# Patient Record
Sex: MALE | Race: WHITE | Employment: FULL TIME | URBAN - NONMETROPOLITAN AREA
[De-identification: names, ages, dates, MRNs, and addresses within clinical notes are randomized per-mention and may not be internally consistent; named-entity substitution may affect disease eponyms.]

---

## 2017-11-28 ENCOUNTER — OFFICE VISIT (OUTPATIENT)
Dept: FAMILY MEDICINE CLINIC | Age: 57
End: 2017-11-28
Payer: COMMERCIAL

## 2017-11-28 VITALS
RESPIRATION RATE: 20 BRPM | SYSTOLIC BLOOD PRESSURE: 130 MMHG | HEIGHT: 69 IN | WEIGHT: 218 LBS | DIASTOLIC BLOOD PRESSURE: 82 MMHG | BODY MASS INDEX: 32.29 KG/M2 | HEART RATE: 102 BPM | OXYGEN SATURATION: 97 %

## 2017-11-28 DIAGNOSIS — J20.9 ACUTE BRONCHITIS, UNSPECIFIED ORGANISM: Primary | ICD-10-CM

## 2017-11-28 PROCEDURE — 99213 OFFICE O/P EST LOW 20 MIN: CPT | Performed by: FAMILY MEDICINE

## 2017-11-28 RX ORDER — METHYLPREDNISOLONE 4 MG/1
TABLET ORAL
Qty: 1 KIT | Refills: 0 | Status: SHIPPED | OUTPATIENT
Start: 2017-11-28 | End: 2017-12-04

## 2017-11-28 RX ORDER — AZITHROMYCIN 250 MG/1
TABLET, FILM COATED ORAL
Qty: 6 TABLET | Refills: 0 | Status: SHIPPED | OUTPATIENT
Start: 2017-11-28 | End: 2017-12-08

## 2017-11-28 ASSESSMENT — ENCOUNTER SYMPTOMS
DIARRHEA: 0
SORE THROAT: 1
WHEEZING: 1
NAUSEA: 0
SINUS PRESSURE: 1
RHINORRHEA: 1
SHORTNESS OF BREATH: 1
COUGH: 1
ABDOMINAL PAIN: 0
CHEST TIGHTNESS: 1
CONSTIPATION: 0
EYE DISCHARGE: 0

## 2017-11-28 ASSESSMENT — PATIENT HEALTH QUESTIONNAIRE - PHQ9
1. LITTLE INTEREST OR PLEASURE IN DOING THINGS: 0
2. FEELING DOWN, DEPRESSED OR HOPELESS: 0
SUM OF ALL RESPONSES TO PHQ9 QUESTIONS 1 & 2: 0
SUM OF ALL RESPONSES TO PHQ QUESTIONS 1-9: 0

## 2017-11-28 NOTE — PROGRESS NOTES
[facility]  [address] [place] [address]  Dept: [phone]  Dept Fax: [phone]  Loc: [phone]    Cherylyn Collet is a 61 y.o. male who presents today for his medical conditions/complaints as noted below. Chief Complaint   Patient presents with    Cough     chest congestion chest tightness and SOB with the cough started yesterday sinus pressure congestion            HPI:     Cough   This is a new problem. The current episode started in the past 7 days. The problem has been gradually worsening. The problem occurs every few minutes. The cough is productive of sputum. Associated symptoms include chills, myalgias, nasal congestion, postnasal drip, rhinorrhea, a sore throat, shortness of breath and wheezing. Pertinent negatives include no chest pain, fever or headaches. The symptoms are aggravated by lying down and cold air. He has tried OTC cough suppressant for the symptoms. The treatment provided no relief. There is no history of asthma, bronchiectasis or COPD. Current Outpatient Prescriptions   Medication Sig Dispense Refill    methylPREDNISolone (MEDROL DOSEPACK) 4 MG tablet Take by mouth. 1 kit 0    azithromycin (ZITHROMAX Z-RL) 250 MG tablet As directed 6 tablet 0     No current facility-administered medications for this visit. No Known Allergies    Subjective:      Review of Systems   Constitutional: Positive for activity change, chills and fatigue. Negative for fever. HENT: Positive for congestion, postnasal drip, rhinorrhea, sinus pressure and sore throat. Eyes: Negative for discharge and visual disturbance. Respiratory: Positive for cough, chest tightness, shortness of breath and wheezing. Cardiovascular: Negative for chest pain and palpitations. Gastrointestinal: Negative for abdominal pain, constipation, diarrhea and nausea. Genitourinary: Negative for dysuria and hematuria.    Musculoskeletal: Positive for myalgias. Negative for arthralgias. Neurological: Negative for dizziness and headaches. Psychiatric/Behavioral: Negative for sleep disturbance. The patient is not nervous/anxious. Objective:     /82   Pulse 102   Resp 20   Ht 5' 9\" (1.753 m)   Wt 218 lb (98.9 kg)   SpO2 97%   BMI 32.19 kg/m²     Physical Exam   Constitutional: He is oriented to person, place, and time. He appears well-developed and well-nourished. No distress. HENT:   Head: Normocephalic and atraumatic. Right Ear: Hearing, tympanic membrane, external ear and ear canal normal.   Left Ear: Hearing, tympanic membrane, external ear and ear canal normal.   Nose: Right sinus exhibits no maxillary sinus tenderness and no frontal sinus tenderness. Left sinus exhibits no maxillary sinus tenderness and no frontal sinus tenderness. Mouth/Throat: Oropharynx is clear and moist and mucous membranes are normal. No oropharyngeal exudate, posterior oropharyngeal edema or posterior oropharyngeal erythema. Eyes: Conjunctivae and EOM are normal. Pupils are equal, round, and reactive to light. Right eye exhibits no discharge. Left eye exhibits no discharge. No scleral icterus. Neck: Normal range of motion. Neck supple. Cardiovascular: Normal rate, regular rhythm, normal heart sounds and intact distal pulses. Pulmonary/Chest: Effort normal. No respiratory distress. He has decreased breath sounds. He has wheezes. Abdominal: Soft. Bowel sounds are normal. He exhibits no distension. There is no tenderness. Musculoskeletal: Normal range of motion. He exhibits no edema or tenderness. Lymphadenopathy:     He has no cervical adenopathy. Neurological: He is alert and oriented to person, place, and time. He exhibits normal muscle tone. Coordination normal.   Skin: Skin is warm and dry. No rash noted. Psychiatric: He has a normal mood and affect.  His behavior is normal. Judgment and thought content normal.   Nursing note and vitals reviewed. Assessment:      1. Acute bronchitis, unspecified organism  methylPREDNISolone (MEDROL DOSEPACK) 4 MG tablet    azithromycin (ZITHROMAX Z-RL) 250 MG tablet       Plan:     Medrol dose pack and zpack for bronchitis. Follow up if not improved  Discussed use, benefit, and side effects of prescribed medications. Barriers to medication compliance addressed. All patient questions answered. Pt voiced understanding. No Follow-up on file. No orders of the defined types were placed in this encounter. Orders Placed This Encounter   Medications    methylPREDNISolone (MEDROL DOSEPACK) 4 MG tablet     Sig: Take by mouth.      Dispense:  1 kit     Refill:  0    azithromycin (ZITHROMAX Z-RL) 250 MG tablet     Sig: As directed     Dispense:  6 tablet     Refill:  0           Electronically signed by Murali Nuñez MD on 11/28/2017 at 9:53 AM

## 2017-11-28 NOTE — PATIENT INSTRUCTIONS
Tobacco Cessation Programs     Telephonic behavior modification  Saloni White ([phone])   Counseling service for those who are ready to quit using tobacco.     Available for uninsured [place] residents, [place] recipients, pregnant women, or patients whose health plans or employers are members of the [address] [place] [address] behavior modification   http://[place]. [employer]uitlogix. org   Online support program to help patients through each step of the quitting process. Available 24 hours a day 7 days a week. Provides up to date researched based tool, step-by-step guides, and motivational messages. Online behavior modification   www.lungusa.org/stop-smoking/how-to-quit   HelpLine: [facility] (911-2832)   Email questions to:  [email or web address]. org    Website offers resources to help tobacco users figure out their reasons for quitting and then take the big step of quitting for good. Hypnosis   Location: [address] [place] [address] [facility] [place] [address] [address], [place]   Contact: Napoleon Cabrera, PhD at [phone]   Hypnosis for tobacco cessation   Cost $225 for the initial session and $175 for each session afterwards. Most patients require 6-8 sessions. There is the option to submit through the patients insurance. Hypnosis and behavior modification   Location:  [address] [address], [place]   Contact: Zack Lynne, PhD at [phone]  [facility] Counseling and hypnosis for nicotine addition   Cost: For uninsured patients:  Please call above phone number  Cost for insured patients depends on patients insurance plan. Behavior modification   Location: [facility], [address] [place] [address] [address] [employer] [place] [address] [place] [address], [place]   Contact: Lucretia Mccall include four one-on-one appointments between the patient and a respiratory therapist.  The four appointments span over three weeks. The respiratory therapist schedules one of the appointments to occur 48 hours after the patients quit date.     Cost $100 total for the four sessions.   Tobacco cessation products are not included in the cost and are not provided by [facility].     [facility]

## 2017-12-04 ENCOUNTER — TELEPHONE (OUTPATIENT)
Dept: FAMILY MEDICINE CLINIC | Age: 57
End: 2017-12-04

## 2017-12-04 RX ORDER — METHYLPREDNISOLONE 4 MG/1
TABLET ORAL
Qty: 1 KIT | Refills: 0 | Status: SHIPPED | OUTPATIENT
Start: 2017-12-04 | End: 2017-12-10

## 2017-12-04 RX ORDER — AMOXICILLIN AND CLAVULANATE POTASSIUM 875; 125 MG/1; MG/1
1 TABLET, FILM COATED ORAL 2 TIMES DAILY WITH MEALS
Qty: 20 TABLET | Refills: 0 | Status: SHIPPED | OUTPATIENT
Start: 2017-12-04 | End: 2017-12-11

## 2017-12-04 NOTE — TELEPHONE ENCOUNTER
12/4/17 Pt is asking if JR could call another script in for his sxs. Pt was seen 11/28/17 for bronchitis, (ATB and medrol dose pack) and he is not feeling any better. Pt still has a harsh non productive cough, some difficulty breathing. Pt goes to [facility].  Pls advise thanks/boone

## 2017-12-11 ENCOUNTER — APPOINTMENT (OUTPATIENT)
Dept: GENERAL RADIOLOGY | Age: 57
DRG: 308 | End: 2017-12-11
Payer: COMMERCIAL

## 2017-12-11 ENCOUNTER — APPOINTMENT (OUTPATIENT)
Dept: CT IMAGING | Age: 57
DRG: 308 | End: 2017-12-11
Payer: COMMERCIAL

## 2017-12-11 ENCOUNTER — APPOINTMENT (OUTPATIENT)
Dept: INTERVENTIONAL RADIOLOGY/VASCULAR | Age: 57
DRG: 308 | End: 2017-12-11
Payer: COMMERCIAL

## 2017-12-11 ENCOUNTER — HOSPITAL ENCOUNTER (INPATIENT)
Age: 57
LOS: 4 days | Discharge: HOME | DRG: 308 | End: 2017-12-15
Attending: FAMILY MEDICINE | Admitting: INTERNAL MEDICINE
Payer: COMMERCIAL

## 2017-12-11 ENCOUNTER — OFFICE VISIT (OUTPATIENT)
Dept: FAMILY MEDICINE CLINIC | Age: 57
End: 2017-12-11
Payer: COMMERCIAL

## 2017-12-11 VITALS
SYSTOLIC BLOOD PRESSURE: 132 MMHG | RESPIRATION RATE: 20 BRPM | WEIGHT: 234 LBS | HEART RATE: 80 BPM | BODY MASS INDEX: 34.66 KG/M2 | DIASTOLIC BLOOD PRESSURE: 84 MMHG | OXYGEN SATURATION: 98 % | HEIGHT: 69 IN

## 2017-12-11 DIAGNOSIS — I48.91 ATRIAL FIBRILLATION WITH RVR (HCC): Primary | ICD-10-CM

## 2017-12-11 DIAGNOSIS — R06.02 SHORTNESS OF BREATH: ICD-10-CM

## 2017-12-11 DIAGNOSIS — D72.829 LEUKOCYTOSIS, UNSPECIFIED TYPE: ICD-10-CM

## 2017-12-11 DIAGNOSIS — R00.0 TACHYCARDIA: ICD-10-CM

## 2017-12-11 LAB
ALBUMIN SERPL-MCNC: 4.3 G/DL (ref 3.5–5.1)
ALP BLD-CCNC: 57 U/L (ref 38–126)
ALT SERPL-CCNC: 48 U/L (ref 11–66)
AMPHETAMINE+METHAMPHETAMINE URINE SCREEN: NEGATIVE
ANION GAP SERPL CALCULATED.3IONS-SCNC: 14 MEQ/L (ref 8–16)
ANISOCYTOSIS: ABNORMAL
APTT: 30.3 SECONDS (ref 22–38)
APTT: > 200 SECONDS (ref 22–38)
AST SERPL-CCNC: 29 U/L (ref 5–40)
BARBITURATE QUANTITATIVE URINE: NEGATIVE
BASOPHILS # BLD: 0.3 %
BASOPHILS ABSOLUTE: 0 THOU/MM3 (ref 0–0.1)
BENZODIAZEPINE QUANTITATIVE URINE: NEGATIVE
BILIRUB SERPL-MCNC: 2.4 MG/DL (ref 0.3–1.2)
BILIRUBIN DIRECT: 0.5 MG/DL (ref 0–0.3)
BILIRUBIN URINE: NEGATIVE
BLOOD, URINE: NEGATIVE
BUN BLDV-MCNC: 18 MG/DL (ref 7–22)
CALCIUM SERPL-MCNC: 9 MG/DL (ref 8.5–10.5)
CANNABINOID QUANTITATIVE URINE: NEGATIVE
CHARACTER, URINE: CLEAR
CHLORIDE BLD-SCNC: 100 MEQ/L (ref 98–111)
CO2: 26 MEQ/L (ref 23–33)
COCAINE METABOLITE QUANTITATIVE URINE: NEGATIVE
COLOR: YELLOW
CREAT SERPL-MCNC: 0.9 MG/DL (ref 0.4–1.2)
EKG ATRIAL RATE: 190 BPM
EKG Q-T INTERVAL: 202 MS
EKG QRS DURATION: 70 MS
EKG QTC CALCULATION (BAZETT): 369 MS
EKG R AXIS: 112 DEGREES
EKG T AXIS: 57 DEGREES
EKG VENTRICULAR RATE: 201 BPM
EOSINOPHIL # BLD: 0.7 %
EOSINOPHILS ABSOLUTE: 0.1 THOU/MM3 (ref 0–0.4)
ETHYL ALCOHOL, SERUM: < 0.01 %
FLU A ANTIGEN: NEGATIVE
FLU B ANTIGEN: NEGATIVE
GFR SERPL CREATININE-BSD FRML MDRD: 86 ML/MIN/1.73M2
GLUCOSE BLD-MCNC: 118 MG/DL (ref 70–108)
GLUCOSE URINE: NEGATIVE MG/DL
HCT VFR BLD CALC: 56.6 % (ref 42–52)
HEMOGLOBIN: 18.7 GM/DL (ref 14–18)
INR BLD: 1 (ref 0.85–1.13)
KETONES, URINE: NEGATIVE
LACTIC ACID: 1.4 MMOL/L (ref 0.5–2.2)
LEUKOCYTE ESTERASE, URINE: NEGATIVE
LV EF: 28 %
LVEF MODALITY: NORMAL
LYMPHOCYTES # BLD: 13.7 %
LYMPHOCYTES ABSOLUTE: 2.2 THOU/MM3 (ref 1–4.8)
MAGNESIUM: 2.2 MG/DL (ref 1.6–2.4)
MCH RBC QN AUTO: 32 PG (ref 27–31)
MCHC RBC AUTO-ENTMCNC: 33 GM/DL (ref 33–37)
MCV RBC AUTO: 97 FL (ref 80–94)
MONOCYTES # BLD: 7.8 %
MONOCYTES ABSOLUTE: 1.3 THOU/MM3 (ref 0.4–1.3)
NITRITE, URINE: NEGATIVE
NUCLEATED RED BLOOD CELLS: 0 /100 WBC
OPIATES, URINE: NEGATIVE
OSMOLALITY CALCULATION: 282.4 MOSMOL/KG (ref 275–300)
OXYCODONE: NEGATIVE
PDW BLD-RTO: 14.9 % (ref 11.5–14.5)
PH UA: 6
PHENCYCLIDINE QUANTITATIVE URINE: NEGATIVE
PLATELET # BLD: 141 THOU/MM3 (ref 130–400)
PMV BLD AUTO: 10 MCM (ref 7.4–10.4)
POTASSIUM SERPL-SCNC: 4.2 MEQ/L (ref 3.5–5.2)
PRO-BNP: 2274 PG/ML (ref 0–900)
PROCALCITONIN: 0.18 NG/ML (ref 0.01–0.09)
PROTEIN UA: NEGATIVE
RBC # BLD: 5.83 MILL/MM3 (ref 4.7–6.1)
SEG NEUTROPHILS: 77.5 %
SEGMENTED NEUTROPHILS ABSOLUTE COUNT: 12.7 THOU/MM3 (ref 1.8–7.7)
SODIUM BLD-SCNC: 140 MEQ/L (ref 135–145)
SPECIFIC GRAVITY, URINE: 1.03 (ref 1–1.03)
T4 FREE: 1.26 NG/DL (ref 0.93–1.76)
TOTAL PROTEIN: 6.7 G/DL (ref 6.1–8)
TROPONIN T: < 0.01 NG/ML
TSH SERPL DL<=0.05 MIU/L-ACNC: 4.66 UIU/ML (ref 0.4–4.2)
UROBILINOGEN, URINE: 0.2 EU/DL
WBC # BLD: 16.4 THOU/MM3 (ref 4.8–10.8)

## 2017-12-11 PROCEDURE — 6360000002 HC RX W HCPCS

## 2017-12-11 PROCEDURE — 80307 DRUG TEST PRSMV CHEM ANLYZR: CPT

## 2017-12-11 PROCEDURE — 96365 THER/PROPH/DIAG IV INF INIT: CPT

## 2017-12-11 PROCEDURE — 1200000003 HC TELEMETRY R&B

## 2017-12-11 PROCEDURE — 83880 ASSAY OF NATRIURETIC PEPTIDE: CPT

## 2017-12-11 PROCEDURE — 84443 ASSAY THYROID STIM HORMONE: CPT

## 2017-12-11 PROCEDURE — 93971 EXTREMITY STUDY: CPT

## 2017-12-11 PROCEDURE — G0480 DRUG TEST DEF 1-7 CLASSES: HCPCS

## 2017-12-11 PROCEDURE — 87040 BLOOD CULTURE FOR BACTERIA: CPT

## 2017-12-11 PROCEDURE — 2500000003 HC RX 250 WO HCPCS: Performed by: FAMILY MEDICINE

## 2017-12-11 PROCEDURE — 85025 COMPLETE CBC W/AUTO DIFF WBC: CPT

## 2017-12-11 PROCEDURE — 84439 ASSAY OF FREE THYROXINE: CPT

## 2017-12-11 PROCEDURE — 99285 EMERGENCY DEPT VISIT HI MDM: CPT

## 2017-12-11 PROCEDURE — 2580000003 HC RX 258: Performed by: FAMILY MEDICINE

## 2017-12-11 PROCEDURE — 93000 ELECTROCARDIOGRAM COMPLETE: CPT | Performed by: NURSE PRACTITIONER

## 2017-12-11 PROCEDURE — 6360000002 HC RX W HCPCS: Performed by: INTERNAL MEDICINE

## 2017-12-11 PROCEDURE — 83735 ASSAY OF MAGNESIUM: CPT

## 2017-12-11 PROCEDURE — 71010 XR CHEST PORTABLE: CPT

## 2017-12-11 PROCEDURE — 84484 ASSAY OF TROPONIN QUANT: CPT

## 2017-12-11 PROCEDURE — 93306 TTE W/DOPPLER COMPLETE: CPT

## 2017-12-11 PROCEDURE — 85610 PROTHROMBIN TIME: CPT

## 2017-12-11 PROCEDURE — 99214 OFFICE O/P EST MOD 30 MIN: CPT | Performed by: NURSE PRACTITIONER

## 2017-12-11 PROCEDURE — 99223 1ST HOSP IP/OBS HIGH 75: CPT | Performed by: INTERNAL MEDICINE

## 2017-12-11 PROCEDURE — 87804 INFLUENZA ASSAY W/OPTIC: CPT

## 2017-12-11 PROCEDURE — 83605 ASSAY OF LACTIC ACID: CPT

## 2017-12-11 PROCEDURE — 2580000003 HC RX 258: Performed by: INTERNAL MEDICINE

## 2017-12-11 PROCEDURE — 85730 THROMBOPLASTIN TIME PARTIAL: CPT

## 2017-12-11 PROCEDURE — 36415 COLL VENOUS BLD VENIPUNCTURE: CPT

## 2017-12-11 PROCEDURE — 96375 TX/PRO/DX INJ NEW DRUG ADDON: CPT

## 2017-12-11 PROCEDURE — 82248 BILIRUBIN DIRECT: CPT

## 2017-12-11 PROCEDURE — 96376 TX/PRO/DX INJ SAME DRUG ADON: CPT

## 2017-12-11 PROCEDURE — 96361 HYDRATE IV INFUSION ADD-ON: CPT

## 2017-12-11 PROCEDURE — 81003 URINALYSIS AUTO W/O SCOPE: CPT

## 2017-12-11 PROCEDURE — 80053 COMPREHEN METABOLIC PANEL: CPT

## 2017-12-11 PROCEDURE — 6360000004 HC RX CONTRAST MEDICATION: Performed by: FAMILY MEDICINE

## 2017-12-11 PROCEDURE — 84145 PROCALCITONIN (PCT): CPT

## 2017-12-11 PROCEDURE — 71275 CT ANGIOGRAPHY CHEST: CPT

## 2017-12-11 PROCEDURE — 93005 ELECTROCARDIOGRAM TRACING: CPT

## 2017-12-11 RX ORDER — HEPARIN SODIUM 1000 [USP'U]/ML
INJECTION, SOLUTION INTRAVENOUS; SUBCUTANEOUS
Status: COMPLETED
Start: 2017-12-11 | End: 2017-12-11

## 2017-12-11 RX ORDER — AMOXICILLIN AND CLAVULANATE POTASSIUM 875; 125 MG/1; MG/1
1 TABLET, FILM COATED ORAL 2 TIMES DAILY
Status: ON HOLD | COMMUNITY
End: 2017-12-15 | Stop reason: HOSPADM

## 2017-12-11 RX ORDER — SODIUM CHLORIDE 0.9 % (FLUSH) 0.9 %
10 SYRINGE (ML) INJECTION EVERY 12 HOURS SCHEDULED
Status: DISCONTINUED | OUTPATIENT
Start: 2017-12-11 | End: 2017-12-15 | Stop reason: HOSPADM

## 2017-12-11 RX ORDER — DILTIAZEM HYDROCHLORIDE 5 MG/ML
20 INJECTION INTRAVENOUS ONCE
Status: COMPLETED | OUTPATIENT
Start: 2017-12-11 | End: 2017-12-11

## 2017-12-11 RX ORDER — HEPARIN SODIUM 1000 [USP'U]/ML
4000 INJECTION, SOLUTION INTRAVENOUS; SUBCUTANEOUS PRN
Status: DISCONTINUED | OUTPATIENT
Start: 2017-12-11 | End: 2017-12-13

## 2017-12-11 RX ORDER — HEPARIN SODIUM 10000 [USP'U]/100ML
11 INJECTION, SOLUTION INTRAVENOUS CONTINUOUS
Status: DISCONTINUED | OUTPATIENT
Start: 2017-12-11 | End: 2017-12-13

## 2017-12-11 RX ORDER — DILTIAZEM HYDROCHLORIDE 5 MG/ML
10 INJECTION INTRAVENOUS ONCE
Status: DISCONTINUED | OUTPATIENT
Start: 2017-12-11 | End: 2017-12-11

## 2017-12-11 RX ORDER — METHYLPREDNISOLONE 4 MG/1
4 TABLET ORAL SEE ADMIN INSTRUCTIONS
Status: ON HOLD | COMMUNITY
End: 2017-12-15 | Stop reason: HOSPADM

## 2017-12-11 RX ORDER — AMOXICILLIN AND CLAVULANATE POTASSIUM 875; 125 MG/1; MG/1
1 TABLET, FILM COATED ORAL 2 TIMES DAILY
Status: DISCONTINUED | OUTPATIENT
Start: 2017-12-11 | End: 2017-12-11 | Stop reason: ALTCHOICE

## 2017-12-11 RX ORDER — SODIUM CHLORIDE 0.9 % (FLUSH) 0.9 %
10 SYRINGE (ML) INJECTION PRN
Status: DISCONTINUED | OUTPATIENT
Start: 2017-12-11 | End: 2017-12-15 | Stop reason: HOSPADM

## 2017-12-11 RX ORDER — HEPARIN SODIUM 1000 [USP'U]/ML
2000 INJECTION, SOLUTION INTRAVENOUS; SUBCUTANEOUS PRN
Status: DISCONTINUED | OUTPATIENT
Start: 2017-12-11 | End: 2017-12-13

## 2017-12-11 RX ORDER — METHYLPREDNISOLONE 4 MG/1
4 TABLET ORAL SEE ADMIN INSTRUCTIONS
Status: DISCONTINUED | OUTPATIENT
Start: 2017-12-11 | End: 2017-12-11 | Stop reason: ALTCHOICE

## 2017-12-11 RX ORDER — ONDANSETRON 2 MG/ML
4 INJECTION INTRAMUSCULAR; INTRAVENOUS EVERY 6 HOURS PRN
Status: DISCONTINUED | OUTPATIENT
Start: 2017-12-11 | End: 2017-12-12

## 2017-12-11 RX ORDER — HEPARIN SODIUM 1000 [USP'U]/ML
80 INJECTION, SOLUTION INTRAVENOUS; SUBCUTANEOUS ONCE
Status: COMPLETED | OUTPATIENT
Start: 2017-12-11 | End: 2017-12-11

## 2017-12-11 RX ORDER — ACETAMINOPHEN 325 MG/1
650 TABLET ORAL EVERY 4 HOURS PRN
Status: DISCONTINUED | OUTPATIENT
Start: 2017-12-11 | End: 2017-12-15 | Stop reason: HOSPADM

## 2017-12-11 RX ORDER — HEPARIN SODIUM 1000 [USP'U]/ML
40 INJECTION, SOLUTION INTRAVENOUS; SUBCUTANEOUS PRN
Status: DISCONTINUED | OUTPATIENT
Start: 2017-12-11 | End: 2017-12-12 | Stop reason: SDUPTHER

## 2017-12-11 RX ORDER — SODIUM CHLORIDE 9 MG/ML
INJECTION, SOLUTION INTRAVENOUS CONTINUOUS
Status: DISCONTINUED | OUTPATIENT
Start: 2017-12-11 | End: 2017-12-12

## 2017-12-11 RX ORDER — 0.9 % SODIUM CHLORIDE 0.9 %
1000 INTRAVENOUS SOLUTION INTRAVENOUS ONCE
Status: COMPLETED | OUTPATIENT
Start: 2017-12-11 | End: 2017-12-11

## 2017-12-11 RX ORDER — HEPARIN SODIUM 10000 [USP'U]/100ML
18 INJECTION, SOLUTION INTRAVENOUS CONTINUOUS
Status: DISCONTINUED | OUTPATIENT
Start: 2017-12-11 | End: 2017-12-12 | Stop reason: SDUPTHER

## 2017-12-11 RX ORDER — HEPARIN SODIUM 1000 [USP'U]/ML
80 INJECTION, SOLUTION INTRAVENOUS; SUBCUTANEOUS PRN
Status: DISCONTINUED | OUTPATIENT
Start: 2017-12-11 | End: 2017-12-12 | Stop reason: SDUPTHER

## 2017-12-11 RX ORDER — HEPARIN SODIUM 10000 [USP'U]/100ML
INJECTION, SOLUTION INTRAVENOUS
Status: COMPLETED
Start: 2017-12-11 | End: 2017-12-11

## 2017-12-11 RX ADMIN — DILTIAZEM HYDROCHLORIDE 5 MG/HR: 5 INJECTION INTRAVENOUS at 12:21

## 2017-12-11 RX ADMIN — HEPARIN SODIUM 7800 UNITS: 1000 INJECTION, SOLUTION INTRAVENOUS; SUBCUTANEOUS at 11:22

## 2017-12-11 RX ADMIN — HEPARIN SODIUM AND DEXTROSE 11 UNITS/KG/HR: 10000; 5 INJECTION INTRAVENOUS at 14:02

## 2017-12-11 RX ADMIN — HEPARIN SODIUM 18 UNITS/KG/HR: 10000 INJECTION, SOLUTION INTRAVENOUS at 11:22

## 2017-12-11 RX ADMIN — SODIUM CHLORIDE 1000 ML: 9 INJECTION, SOLUTION INTRAVENOUS at 10:31

## 2017-12-11 RX ADMIN — DILTIAZEM HYDROCHLORIDE 20 MG: 5 INJECTION INTRAVENOUS at 10:31

## 2017-12-11 RX ADMIN — HEPARIN SODIUM AND DEXTROSE 18 UNITS/KG/HR: 10000; 5 INJECTION INTRAVENOUS at 11:22

## 2017-12-11 RX ADMIN — DILTIAZEM HYDROCHLORIDE 15 MG/HR: 5 INJECTION INTRAVENOUS at 22:08

## 2017-12-11 RX ADMIN — IOPAMIDOL 80 ML: 755 INJECTION, SOLUTION INTRAVENOUS at 12:10

## 2017-12-11 RX ADMIN — SODIUM CHLORIDE: 9 INJECTION, SOLUTION INTRAVENOUS at 14:01

## 2017-12-11 ASSESSMENT — ENCOUNTER SYMPTOMS
CHEST TIGHTNESS: 0
WHEEZING: 0
NAUSEA: 0
SHORTNESS OF BREATH: 1
EYE REDNESS: 0
STRIDOR: 0
SHORTNESS OF BREATH: 1
COLOR CHANGE: 0
COUGH: 1
EYE DISCHARGE: 0
SINUS PRESSURE: 0
COUGH: 1
WHEEZING: 0
SORE THROAT: 0
NAUSEA: 0
VOMITING: 0
DIARRHEA: 0
ABDOMINAL DISTENTION: 0
RHINORRHEA: 0
ABDOMINAL PAIN: 0
BACK PAIN: 0
VOMITING: 0
SORE THROAT: 0
DIARRHEA: 0
CONSTIPATION: 0
ABDOMINAL PAIN: 0
BACK PAIN: 0

## 2017-12-11 NOTE — PROGRESS NOTES
Pt admitted to  6K21 from ED. Complaints: A. Fib RVR. IV normal saline infusing into the antecubital left, condition patent and no redness at a rate of 100 mls/ hour with about 900 mls in the bag still. IV site free of s/s of infection or infiltration. Vital signs obtained. Assessment and data collection initiated. Oriented to room. Policies and procedures for 6K explained All questions answered with no further questions at this time. Fall prevention and safety brochure discussed with patient.

## 2017-12-11 NOTE — ED PROVIDER NOTES
[facility]  eMERGENCY dEPARTMENT eNCOUnter          CHIEF COMPLAINT       Chief Complaint   Patient presents with    Atrial Fibrillation     RVR       Nurses Notes reviewed and I agree except as noted in the HPI. HISTORY OF PRESENT ILLNESS    Alicia Bullard is a 61 y.o. male who presents to the Emergency Department for the evaluation of tachycardia. The patient states that he has no history of an irregular heart beat. The patient reports that he was evaluated 3 weeks ago for a \"chest cold\" and shortness of breath and was given antibiotics and a prednisone dose pack. He was evaluated again in the middle of last week for the same symptoms and was given Amoxicillin and another regimen of steroids. He admits a feeling of water in his ear without any recent swimming, intermittent shortness of breath and a cough. He also reports that his right leg began swelling last night and has remained swollen. He denies any feeling of his heart racing, or any other signs or symptoms at this time. The patient denies any history of any irregular heart beat,  WPW, atrial fibrillation, myocardial infarction, or any other ongoing medical problems. He denies taking any daily medications. The patient returned from a trip to [facility] approximately 6 months ago. His father has a history of atrial fibrillation. He denies any smoking of cigarettes or any alcohol consumption. The HPI was provided by the patient. REVIEW OF SYSTEMS     Review of Systems   Constitutional: Negative for appetite change, chills, fatigue and fever. HENT: Positive for ear discharge (reported a feeling of water in his ear with no recent swimming). Negative for congestion, ear pain, rhinorrhea and sore throat. Eyes: Negative for discharge, redness and visual disturbance. Respiratory: Positive for cough and shortness of breath (intermittent shortness of breath). Negative for wheezing.     Cardiovascular: Negative for chest pain, palpitations and leg swelling. Gastrointestinal: Negative for abdominal pain, diarrhea, nausea and vomiting. Genitourinary: Negative for decreased urine volume, difficulty urinating and dysuria. Musculoskeletal: Negative for arthralgias, back pain, joint swelling and neck pain. Skin: Negative for pallor and rash. Allergic/Immunologic: Negative for environmental allergies. Neurological: Negative for dizziness, syncope, weakness, light-headedness and headaches. Hematological: Negative for adenopathy. Psychiatric/Behavioral: Negative for agitation, confusion, dysphoric mood and suicidal ideas. The patient is not nervous/anxious. PAST MEDICAL HISTORY    has a past medical history of Atrial fibrillation ([facility] [address] SURGICAL HISTORY      has a past surgical history that includes Cholecystectomy. CURRENT MEDICATIONS       Current Discharge Medication List      CONTINUE these medications which have NOT CHANGED    Details   amoxicillin-clavulanate (AUGMENTIN) 875-125 MG per tablet Take 1 tablet by mouth 2 times daily      methylPREDNISolone (MEDROL, RL,) 4 MG tablet Take 4 mg by mouth See Admin Instructions Take by mouth. ALLERGIES     is allergic to augmentin [amoxicillin-pot clavulanate]. FAMILY HISTORY     indicated that his mother is alive. He indicated that his father is alive. family history includes Cancer in his father. SOCIAL HISTORY      reports that he has never smoked. His smokeless tobacco use includes Snuff. He reports that he drinks alcohol. He reports that he does not use drugs. PHYSICAL EXAM     INITIAL VITALS:  height is 5' 9\" (1.753 m) and weight is 215 lb (97.5 kg). His oral temperature is 97.8 °F (36.6 °C). His blood pressure is 104/88 and his pulse is 127. His respiration is 18 and oxygen saturation is 95%. Physical Exam   Constitutional: He is oriented to person, place, and time. He appears well-developed and well-nourished.    HENT:   Head: Normocephalic and atraumatic. Right Ear: External ear normal.   Left Ear: External ear normal.   Eyes: Conjunctivae are normal. Right eye exhibits no discharge. Left eye exhibits no discharge. No scleral icterus. Neck: Normal range of motion. Neck supple. No JVD present. Cardiovascular: Normal heart sounds and intact distal pulses. An irregularly irregular rhythm present. Tachycardia present. Exam reveals no gallop and no friction rub. No murmur heard. Pulmonary/Chest: Effort normal. No stridor. No respiratory distress. He has no wheezes. He has no rales. Abdominal: Soft. He exhibits no distension. There is no tenderness. There is no rebound and no guarding. Musculoskeletal: Normal range of motion. He exhibits no edema. Neurological: He is alert and oriented to person, place, and time. He exhibits normal muscle tone. GCS eye subscore is 4. GCS verbal subscore is 5. GCS motor subscore is 6. Skin: Skin is warm and dry. He is not diaphoretic. No erythema. Psychiatric: He has a normal mood and affect. His behavior is normal.   Nursing note and vitals reviewed. DIFFERENTIAL DIAGNOSIS not limited to:   Pulmonary embolism, Afib with RVR, ACDS, pneumonia, bronchitis,     DIAGNOSTIC RESULTS     EKG: All EKG's are interpreted by the Emergency Department Physician who either signs or Co-signs this chart in the absence of a cardiologist.  EKG interpreted by Amee Batista MD:    Vent. Rate: 201 bpm  OK interval: * ms  QRS duration: 70 ms  QTc: 369 ms  P-R-T axes: *, 112, 57  Atrial fibrillation with rapid ventricular response, No STEMI      RADIOLOGY: non-plain film images(s) such as CT, Ultrasound and MRI are read by the radiologist.    CTA CHEST W WO CONTRAST   Final Result       1. No pulmonary emboli or pulmonary infiltrates. 2. There is a small pleural effusion on the right with a trace pleural effusion on the left.       3. A trace amount of ascites is noted along the lateral aspect of the liver. **This report has been created using voice recognition software. It may contain minor errors which are inherent in voice recognition technology. **      Final report electronically signed by Dr. Salvador Sorenson on 12/11/2017 12:29 PM      XR Chest Portable   Final Result   NO EVIDENCE OF ACUTE CARDIOPULMONARY PROCESS. **This report has been created using voice recognition software. It may contain minor errors which are inherent in voice recognition technology. **            Final report electronically signed by Dr. Billie Welch on 12/11/2017 11:33 AM      VL DUP LOWER EXTREMITY VENOUS RIGHT   Final Result   No evidence of deep venous thrombosis in the right lower extremity. Final report electronically signed by Dr. Jerad Cortez on 12/11/2017 11:03 AM          LABS:   Labs Reviewed   CBC WITH AUTO DIFFERENTIAL - Abnormal; Notable for the following:        Result Value    WBC 16.4 (*)     Hemoglobin 18.7 (*)     Hematocrit 56.6 (*)     MCV 97.0 (*)     MCH 32.0 (*)     RDW 14.9 (*)     Segs Absolute 12.7 (*)     All other components within normal limits   BASIC METABOLIC PANEL - Abnormal; Notable for the following:     Glucose 118 (*)     All other components within normal limits   HEPATIC FUNCTION PANEL - Abnormal; Notable for the following:      Total Bilirubin 2.4 (*)     Bilirubin, Direct 0.5 (*)     All other components within normal limits   TSH WITH REFLEX - Abnormal; Notable for the following:     TSH 4.660 (*)     All other components within normal limits   BRAIN NATRIURETIC PEPTIDE - Abnormal; Notable for the following:     Pro-BNP 2274.0 (*)     All other components within normal limits   APTT - Abnormal; Notable for the following:     aPTT > 200.0 (*)     All other components within normal limits   GLOMERULAR FILTRATION RATE, ESTIMATED - Abnormal; Notable for the following:     Est, Glom Filt Rate 86 (*)     All other components within normal limits

## 2017-12-11 NOTE — H&P
Family History:          Problem Relation Age of Onset    Cancer Father        Diet:  DIET CARDIAC;    REVIEW OF SYSTEMS:   Pertinent positives as noted in the HPI. All other systems reviewed and negative. PHYSICAL EXAM:    BP (!) 125/111   Pulse 140   Temp 97.8 °F (36.6 °C) (Oral)   Resp 18   Ht 5' 9\" (1.753 m)   Wt 215 lb (97.5 kg)   SpO2 94%   BMI 31.75 kg/m²     Gen/overall appearance: Not in acute distress. Alert. Head: Normocephalic, atraumatic  Eyes: EOMI, no scleral icterus  CVS: irreg irreg, tachy, Normal S1S2  Pulm: Clear to auscultation bilaterally. No crackles/wheezes  Gastrointestinal: Soft, nontender, nondistended, no guarding or rebound  Extremities: 1+ bilat pitting edema. No erythema or warmth  Neuro: No gross focal deficits noted  Skin: Warm, dry    Labs:     Recent Labs      12/11/17   1015   WBC  16.4*   HGB  18.7*   HCT  56.6*   PLT  141     Recent Labs      12/11/17   1015   NA  140   K  4.2   CL  100   CO2  26   BUN  18   CREATININE  0.9   CALCIUM  9.0     Recent Labs      12/11/17   1015   AST  29   ALT  48   BILIDIR  0.5*   BILITOT  2.4*   ALKPHOS  57     Recent Labs      12/11/17   1015   INR  1.00     No results for input(s): Sukumar Johnson in the last 72 hours. Urinalysis:      Lab Results   Component Value Date    NITRU NEGATIVE 12/11/2017    BLOODU NEGATIVE 12/11/2017    GLUCOSEU NEGATIVE 12/11/2017       Radiology:     Cta Chest W Wo Contrast    Result Date: 12/11/2017  PROCEDURE: CTA CHEST W WO CONTRAST CLINICAL INFORMATION: patient with sob tachycardia cough attention PE. COMPARISON: None available. TECHNIQUE: 1.5 mm axial images were obtained through the chest after the administration of 80 mL Isovue 370 IV contrast.  A non-contrast localizer was obtained. 3D reconstructions were performed on the scanner to include oblique coronal MIP images through both the right and left pulmonary arteries.  All CT scans at this facility use dose modulation, iterative CARDIOPULMONARY PROCESS. **This report has been created using voice recognition software. It may contain minor errors which are inherent in voice recognition technology. ** Final report electronically signed by Dr. Kae Paul on 12/11/2017 11:33 AM    Vl Dup Lower Extremity Venous Right    Result Date: 12/11/2017  PROCEDURE: VL LOWER EXTREMITY VENOUS RIGHT CLINICAL INFORMATION: Right lower extremity swelling TECHNIQUE: High-resolution duplex ultrasound of the right lower extremity was performed. The common femoral, superficial femoral, popliteal and calf vein segments were studied to the ankle. COMPARISON: None FINDINGS: There is normal compressibility with no evidence of thrombus. Flow study shows normal color flow, augmentation and phasic response. No evidence of deep venous thrombosis in the right lower extremity. Final report electronically signed by Dr. Cecile Vargas on 12/11/2017 11:03 AM           DVT prophylaxis: [] Lovenox                                 [] SCDs                                 [x] SQ Heparin - GTT                                 [] Encourage ambulation           [] Already on Anticoagulation    Code Status: Full Code    Disposition:    [x] Home       [] TCU       [] Rehab       [] Psych       [] SNF       [] [place]       [] Other-    ASSESSMENT:    C/Erin Carvajal [address] Problems    Diagnosis Date Noted    Atrial fibrillation with RVR ([address] [I48.91] 12/11/2017     Atrial fib with RVR. CHADS score of 0. Symptomatic with HR >200 prior to arrival.  Possibly exacerbated per URI symptoms. HR trending down on cardizem GTT. Bilaterally LE edema.   Likely CHF 2/2 above  No significant PMH    PLAN:  Wean cardizem GTT as tolerated  On heparin gtt for now for possible cardioversion if he does not convert  Likely will not need AC upon d/c as CHADS score of 0  ECHO  Monitor and replace raza    Thank you Kanu Holland CNP for the opportunity to be involved in this patient's

## 2017-12-11 NOTE — ED NOTES
Pt states that feels pretty good. Breathing better since resting in bed. No other c/o or needs. Will continue to monitor.      Marnie Shankar RN  12/11/17 3188

## 2017-12-12 LAB
ANION GAP SERPL CALCULATED.3IONS-SCNC: 13 MEQ/L (ref 8–16)
ANISOCYTOSIS: ABNORMAL
APTT: 34.5 SECONDS (ref 22–38)
APTT: 56.4 SECONDS (ref 22–38)
APTT: 64.2 SECONDS (ref 22–38)
APTT: 66.4 SECONDS (ref 22–38)
BASOPHILS # BLD: 0.4 %
BASOPHILS ABSOLUTE: 0 THOU/MM3 (ref 0–0.1)
BUN BLDV-MCNC: 13 MG/DL (ref 7–22)
CALCIUM SERPL-MCNC: 8.5 MG/DL (ref 8.5–10.5)
CHLORIDE BLD-SCNC: 101 MEQ/L (ref 98–111)
CO2: 25 MEQ/L (ref 23–33)
CREAT SERPL-MCNC: 0.8 MG/DL (ref 0.4–1.2)
EKG ATRIAL RATE: 150 BPM
EKG Q-T INTERVAL: 280 MS
EKG QRS DURATION: 82 MS
EKG QTC CALCULATION (BAZETT): 468 MS
EKG R AXIS: 105 DEGREES
EKG T AXIS: 39 DEGREES
EKG VENTRICULAR RATE: 168 BPM
EOSINOPHIL # BLD: 1 %
EOSINOPHILS ABSOLUTE: 0.1 THOU/MM3 (ref 0–0.4)
GFR SERPL CREATININE-BSD FRML MDRD: > 90 ML/MIN/1.73M2
GLUCOSE BLD-MCNC: 111 MG/DL (ref 70–108)
HCT VFR BLD CALC: 50.8 % (ref 42–52)
HEMOGLOBIN: 17.3 GM/DL (ref 14–18)
LYMPHOCYTES # BLD: 18.2 %
LYMPHOCYTES ABSOLUTE: 2 THOU/MM3 (ref 1–4.8)
MCH RBC QN AUTO: 33.5 PG (ref 27–31)
MCHC RBC AUTO-ENTMCNC: 34 GM/DL (ref 33–37)
MCV RBC AUTO: 98.6 FL (ref 80–94)
MONOCYTES # BLD: 8.3 %
MONOCYTES ABSOLUTE: 0.9 THOU/MM3 (ref 0.4–1.3)
NUCLEATED RED BLOOD CELLS: 0 /100 WBC
PDW BLD-RTO: 14.9 % (ref 11.5–14.5)
PLATELET # BLD: 116 THOU/MM3 (ref 130–400)
PMV BLD AUTO: 9.9 MCM (ref 7.4–10.4)
POTASSIUM SERPL-SCNC: 4.1 MEQ/L (ref 3.5–5.2)
RBC # BLD: 5.15 MILL/MM3 (ref 4.7–6.1)
SEG NEUTROPHILS: 72.1 %
SEGMENTED NEUTROPHILS ABSOLUTE COUNT: 7.8 THOU/MM3 (ref 1.8–7.7)
SODIUM BLD-SCNC: 139 MEQ/L (ref 135–145)
WBC # BLD: 10.8 THOU/MM3 (ref 4.8–10.8)

## 2017-12-12 PROCEDURE — 85730 THROMBOPLASTIN TIME PARTIAL: CPT

## 2017-12-12 PROCEDURE — 80048 BASIC METABOLIC PNL TOTAL CA: CPT

## 2017-12-12 PROCEDURE — 6360000002 HC RX W HCPCS: Performed by: INTERNAL MEDICINE

## 2017-12-12 PROCEDURE — 1200000003 HC TELEMETRY R&B

## 2017-12-12 PROCEDURE — 99223 1ST HOSP IP/OBS HIGH 75: CPT | Performed by: INTERNAL MEDICINE

## 2017-12-12 PROCEDURE — 2500000003 HC RX 250 WO HCPCS: Performed by: FAMILY MEDICINE

## 2017-12-12 PROCEDURE — 6370000000 HC RX 637 (ALT 250 FOR IP): Performed by: INTERNAL MEDICINE

## 2017-12-12 PROCEDURE — 2580000003 HC RX 258: Performed by: FAMILY MEDICINE

## 2017-12-12 PROCEDURE — 99233 SBSQ HOSP IP/OBS HIGH 50: CPT | Performed by: FAMILY MEDICINE

## 2017-12-12 PROCEDURE — 85025 COMPLETE CBC W/AUTO DIFF WBC: CPT

## 2017-12-12 PROCEDURE — 2580000003 HC RX 258: Performed by: INTERNAL MEDICINE

## 2017-12-12 PROCEDURE — 36415 COLL VENOUS BLD VENIPUNCTURE: CPT

## 2017-12-12 PROCEDURE — 2500000003 HC RX 250 WO HCPCS: Performed by: INTERNAL MEDICINE

## 2017-12-12 RX ORDER — METOPROLOL SUCCINATE 50 MG/1
50 TABLET, EXTENDED RELEASE ORAL 2 TIMES DAILY
Status: DISCONTINUED | OUTPATIENT
Start: 2017-12-12 | End: 2017-12-13

## 2017-12-12 RX ORDER — AMIODARONE HYDROCHLORIDE 200 MG/1
400 TABLET ORAL 2 TIMES DAILY
Status: DISCONTINUED | OUTPATIENT
Start: 2017-12-12 | End: 2017-12-15 | Stop reason: HOSPADM

## 2017-12-12 RX ORDER — BUMETANIDE 0.25 MG/ML
0.5 INJECTION, SOLUTION INTRAMUSCULAR; INTRAVENOUS ONCE
Status: COMPLETED | OUTPATIENT
Start: 2017-12-12 | End: 2017-12-12

## 2017-12-12 RX ORDER — METOPROLOL SUCCINATE 50 MG/1
50 TABLET, EXTENDED RELEASE ORAL ONCE
Status: COMPLETED | OUTPATIENT
Start: 2017-12-12 | End: 2017-12-12

## 2017-12-12 RX ORDER — LISINOPRIL 2.5 MG/1
2.5 TABLET ORAL DAILY
Status: DISCONTINUED | OUTPATIENT
Start: 2017-12-12 | End: 2017-12-15 | Stop reason: HOSPADM

## 2017-12-12 RX ORDER — SPIRONOLACTONE 25 MG/1
25 TABLET ORAL DAILY
Status: COMPLETED | OUTPATIENT
Start: 2017-12-12 | End: 2017-12-13

## 2017-12-12 RX ORDER — BUMETANIDE 0.25 MG/ML
0.5 INJECTION, SOLUTION INTRAMUSCULAR; INTRAVENOUS 2 TIMES DAILY
Status: COMPLETED | OUTPATIENT
Start: 2017-12-12 | End: 2017-12-13

## 2017-12-12 RX ORDER — SPIRONOLACTONE 25 MG/1
25 TABLET ORAL DAILY
Status: DISCONTINUED | OUTPATIENT
Start: 2017-12-12 | End: 2017-12-12

## 2017-12-12 RX ADMIN — SPIRONOLACTONE 25 MG: 25 TABLET ORAL at 14:49

## 2017-12-12 RX ADMIN — DILTIAZEM HYDROCHLORIDE 5 MG/HR: 5 INJECTION INTRAVENOUS at 09:58

## 2017-12-12 RX ADMIN — HEPARIN SODIUM AND DEXTROSE 11 UNITS/KG/HR: 10000; 5 INJECTION INTRAVENOUS at 12:34

## 2017-12-12 RX ADMIN — SODIUM CHLORIDE: 9 INJECTION, SOLUTION INTRAVENOUS at 01:00

## 2017-12-12 RX ADMIN — METOPROLOL SUCCINATE 50 MG: 50 TABLET, FILM COATED, EXTENDED RELEASE ORAL at 14:49

## 2017-12-12 RX ADMIN — METOPROLOL SUCCINATE 50 MG: 50 TABLET, FILM COATED, EXTENDED RELEASE ORAL at 20:26

## 2017-12-12 RX ADMIN — HEPARIN SODIUM 4000 UNITS: 1000 INJECTION, SOLUTION INTRAVENOUS; SUBCUTANEOUS at 01:00

## 2017-12-12 RX ADMIN — Medication 10 ML: at 20:26

## 2017-12-12 RX ADMIN — LISINOPRIL 2.5 MG: 2.5 TABLET ORAL at 14:49

## 2017-12-12 RX ADMIN — DILTIAZEM HYDROCHLORIDE 5 MG/HR: 5 INJECTION INTRAVENOUS at 23:54

## 2017-12-12 RX ADMIN — AMIODARONE HYDROCHLORIDE 400 MG: 200 TABLET ORAL at 14:49

## 2017-12-12 RX ADMIN — BUMETANIDE 0.5 MG: 0.25 INJECTION INTRAMUSCULAR; INTRAVENOUS at 16:21

## 2017-12-12 RX ADMIN — AMIODARONE HYDROCHLORIDE 400 MG: 200 TABLET ORAL at 20:25

## 2017-12-12 RX ADMIN — BUMETANIDE 0.5 MG: 0.25 INJECTION INTRAMUSCULAR; INTRAVENOUS at 20:26

## 2017-12-12 NOTE — PROGRESS NOTES
Hospitalist Progress Note      Patient:  Erasto Mckee      Unit/Bed:6K-21/021-A    YOB: 1960    MRN: [id number]       Acct: [de-identified]     PCP: Jace Menjivar CNP    Date of Admission: 12/11/2017    Assessment/Plan:    1. afib with RVR -- apprec cardio assist -- rate controlled with cardizem gtt -- also on heparin gtt and plan per cardio for HAVEN/CV 12/13 -- amiodarone po started 12/12, toprol 50 mg bid started 12/12 per cardio -- cont monitor rate -- TFT 12/11 WNL -- ischemic w/u as outpt per cardio  -- CTA chest 12/11/17 (-) PE  2. Acute systolic CHF -- new - echo 12/11/17 with EF 25-30% with severe global hypokinesis  -- IV bumex 12/12, aldactone started 12/12 per cardio -- on cardizem with #1 - started on toprol 12/12, lisinopril added 12/12 per cardio  3. Cardiomyopathy -- Echo 12/11/17 EF 25-30%, severe global hypokinesis, mild/mod MR -- ?due to #1 and needs ischemic eval - per cardio plans as outpt with cath -- ?viral with recent URI;  UDS and etoh levels (-) on admission 12/11  4. Dyspnea -- due to #1, 2 -- trops (-) - ischemic w/u as outpt per cardio -- CTA chest 12/11/17 (-) PE or infiltrates, +small pleural effusions  5. Leukocytosis -- afeb - blood cx (-), influenza (-), u/a (-)  - ?due to #1, 6 -- improving WBC 12/12  6. Polycythemia -- improving with diuresis   7. Recent URI -- CTA 12/11 (-) infiltrates  8. Mild/mod MR -- per echo 12/11/17  9. Obesity Body mass index is 33.76 kg/m². DIspo  -- 12/12 --> d/w Dr. Minh Martinez - apprec cardio assist -- plan for HAVEN/CV tomorrow - cont cardizem gtt, heparin gtt, toprol, amiodarone - cont with IV diuresis with bumex - cont monitor lytes, HR, BP. Plan d/w pt and parents at bedside. Chief Complaint: sob    Hospital Course: Erasto Mckee is a 61 y.o. male without any prior medical hx admitted with new onset afib with RVR and CHF -- started on heparin gtt, cardizem gtt and cardiology consulted.      Subjective:   -- 12/12 --> pt states he's feeling better - sob improving. Denies cp. No cough. Denies abd pain, n/v.  Denies f/c.  Eric po. On RA. Afebrile. Ambulating without difficulty - no lightheaded or dizziness. Legs still swollen      Medications:  Reviewed    Infusion Medications    diltiazem (CARDIZEM) 125 mg in dextrose 5% 125 mL infusion 5 mg/hr (12/12/17 8465)    heparin (porcine) 13 Units/kg/hr (12/12/17 1236)     Scheduled Medications    bumetanide  0.5 mg Intravenous Once    bumetanide  0.5 mg Intravenous BID    metoprolol succinate  50 mg Oral Once    metoprolol succinate  50 mg Oral BID    lisinopril  2.5 mg Oral Daily    spironolactone  25 mg Oral Daily    amiodarone  400 mg Oral BID    sodium chloride flush  10 mL Intravenous 2 times per day     PRN Meds: sodium chloride flush, acetaminophen, heparin (porcine), heparin (porcine)      Intake/Output Summary (Last 24 hours) at 12/12/17 1433  Last data filed at 12/12/17 1323   Gross per 24 hour   Intake             3980 ml   Output                0 ml   Net             3980 ml       Diet:  Diet NPO, After Midnight    Exam:  BP (!) 130/95   Pulse 99   Temp 98.5 °F (36.9 °C) (Oral)   Resp 18   Ht 5' 9\" (1.753 m)   Wt 232 lb 9.6 oz (105.5 kg)   SpO2 97%   BMI 34.35 kg/m²  RA    General appearance: No apparent distress, appears stated age and cooperative. HEENT: Pupils equal, round, and reactive to light. Conjunctivae/corneas clear. Neck: Supple, with full range of motion. No jugular venous distention. Trachea midline. Respiratory:  Normal respiratory effort. Diminished but Clear to auscultation, bilaterally without Rales/Wheezes/Rhonchi. No respiratory distress or accessory muscle use. Cardiovascular: irreg, irreg rate controlled currently with normal S1/S2 without murmurs, rubs or gallops. Abdomen: Soft, non-tender, non-distended with normal bowel sounds. No rebound or guarding  Musculoskeletal: 1+ BLE edema. Full range of motion without deformity.   No calf tenderness palpation  Skin: Skin color, texture, turgor normal.  No rashes or lesions. Neurologic:  Neurovascularly intact without any focal sensory/motor deficits. Cranial nerves: II-XII intact, grossly non-focal.  Psychiatric: Alert and oriented, thought content appropriate, normal insight  Capillary Refill: Brisk,< 3 seconds   Peripheral Pulses: +2 palpable, equal bilaterally       Labs:   Recent Labs      12/11/17   1015  12/12/17   0509   WBC  16.4*  10.8   HGB  18.7*  17.3   HCT  56.6*  50.8   PLT  141  116*     Recent Labs      12/11/17   1015  12/12/17   0509   NA  140  139   K  4.2  4.1   CL  100  101   CO2  26  25   BUN  18  13   CREATININE  0.9  0.8   CALCIUM  9.0  8.5     Recent Labs      12/11/17   1015   AST  29   ALT  48   BILIDIR  0.5*   BILITOT  2.4*   ALKPHOS  57     Recent Labs      12/11/17   1015   INR  1.00     No results for input(s): April Armando in the last 72 hours. Urinalysis:      Lab Results   Component Value Date    NITRU NEGATIVE 12/11/2017    BLOODU NEGATIVE 12/11/2017    GLUCOSEU NEGATIVE 12/11/2017       Radiology:  CTA CHEST W WO CONTRAST   Final Result       1. No pulmonary emboli or pulmonary infiltrates. 2. There is a small pleural effusion on the right with a trace pleural effusion on the left. 3. A trace amount of ascites is noted along the lateral aspect of the liver. **This report has been created using voice recognition software. It may contain minor errors which are inherent in voice recognition technology. **      Final report electronically signed by Dr. Salvador Sorneson on 12/11/2017 12:29 PM      XR Chest Portable   Final Result   NO EVIDENCE OF ACUTE CARDIOPULMONARY PROCESS. **This report has been created using voice recognition software. It may contain minor errors which are inherent in voice recognition technology. **            Final report electronically signed by Dr. Billie Welch on 12/11/2017 11:33 AM       DUP

## 2017-12-12 NOTE — CARE COORDINATION
12/12/17, 9:24 AM      [address]       Admitted from: ED 12/11/2017/ [facility] day: 1   Location: [facility]- Reason for admit: Atrial fibrillation with RVR ([facility] [address] [I48.91] Status: IP  Admit order signed?: yes  PMH:  has a past medical history of Atrial fibrillation ([facility] [address] Procedure: 12/11 Echo  Pertinent abnormal Imaging:none  Medications:  Scheduled Meds:   sodium chloride flush  10 mL Intravenous 2 times per day     Continuous Infusions:   diltiazem (CARDIZEM) 125 mg in dextrose 5% 125 mL infusion 10 mg/hr (12/12/17 0132)    sodium chloride 75 mL/hr at 12/12/17 0100    heparin (porcine) 11 Units/kg/hr (12/12/17 0100)      Pertinent Info/Orders/Treatment Plan:  Heparin drip, cardizem drip,telemetry,Cardiology consult  Diet: DIET CARDIAC;   DVT Prophylaxis: Heparin  Smoking status:  reports that he has never smoked. His smokeless tobacco use includes Snuff. Influenza Vaccination Screening Completed: yes  Pneumonia Vaccination Screening Completed: yes  Core measures: Heart failure Core measures:  Identify type-not identified  EF:25-30%  BB-no  ACE/ARB-no  Education-heart failure teaching sheet  Referral to clinics? PCP: Lore Blue CNP  Readmission:   no  Risk Score: 1.25     Discharge Planning  Current Residence:  Private Residence  Living Arrangements:  Alone   Support Systems:  Parent, Family Members  Current Services PTA:     Potential Assistance Needed:  N/A  Potential Assistance Purchasing Medications:  No  Does patient want to participate in local refill/ meds to beds program?  No  Type of Home Care Services:  None  Patient expects to be discharged to:  home  Expected Discharge date:  12/14/17  Follow Up Appointment: Best Day/ Time: Friday PM    Discharge Plan: Met with client, from home alone. No equipment or services used. Plans to return home at discharge, denies needs.      Evaluation: no

## 2017-12-12 NOTE — CONSULTS
obesity. PLAN AND RECOMMENDATION:  1. The patient is on IV fluid. I will stop the IV fluid and start on  gentle diuresis, Bumex 0.5 for 3 doses and I will start him on Aldactone 25  daily for 2 doses and further diuresis to be adjusted after initial  stabilization because the CHF could be precipitated by atrial fibrillation  with rapid ventricular response. 2.  With recurrent atrial fibrillation, at this time we will proceed with  rate control and we will use Toprol-XL for rate control and VAUGHN-VASc score  of this gentleman is at least 1, but he has severe cardiomyopathy,  congestive heart failure, so VAUGHN-VASc is 1 or VAUGHN is 1; either way, I  will proceed with oral anticoagulation. Once the patient stabilizes, CHF  controlled, and rate is controlled, we will proceed for HAVEN cardioversion. I will try to schedule him for tomorrow provided the CHF and rapid  ventricular response are controlled. 3.  Long-term anticoagulation: We will put him on Eliquis 5 mg twice a day  after the cardioversion. In the meantime, we will continue with IV  heparin. 4.  We will consider radiofrequency ablation and pulmonary vein isolation  down the line as an outpatient. 5.  With regard to the cardiomyopathy, we will start him on optimal medical  therapy, beta-blocker and ACE inhibitor will be started and need LifeVest  on discharge. We will wean off the Cardizem drip and try to control the  rate with beta-blocker in view of the underlying cardiomyopathy. The  patient needs further evaluation from cardiomyopathy point of view,  certainly needs invasive ischemia workup. We will do invasive ischemia  workup as an outpatient and the patient wants to go home, that is the first  thing he talks to you so.   He wants to go home as soon as possible and  wants to do most of the workup as outpatient, but at least the basic thing,  treating the congestive heart failure, controlling the rate, and  cardioversion should be done as inpatient and further, pulmonary vein  isolation and invasive ischemia workup for the cardiomyopathy to be  scheduled as outpatient. 6.  I discussed with the patient and the nurse the plan of care. The  patient verbalizes understanding and agrees with the plan of care. The  patient understands the risk of bleeding including _____ bleeding from oral  anticoagulation and agrees with the plan of care as well. Thank you for allowing me to participate in the care of this interesting  young gentleman. Natacha Capps.  Josefina Lang M.D.    D: 12/12/2017 14:27:13       T: 12/12/2017 15:48:27     [email or web address]  Job#: [id number]     Doc#: [id number]    CC:

## 2017-12-13 ENCOUNTER — APPOINTMENT (OUTPATIENT)
Dept: GENERAL RADIOLOGY | Age: 57
DRG: 308 | End: 2017-12-13
Payer: COMMERCIAL

## 2017-12-13 ENCOUNTER — APPOINTMENT (OUTPATIENT)
Dept: CARDIAC CATH/INVASIVE PROCEDURES | Age: 57
DRG: 308 | End: 2017-12-13
Payer: COMMERCIAL

## 2017-12-13 LAB
ANION GAP SERPL CALCULATED.3IONS-SCNC: 13 MEQ/L (ref 8–16)
APTT: 67.1 SECONDS (ref 22–38)
APTT: 78.1 SECONDS (ref 22–38)
APTT: 79.6 SECONDS (ref 22–38)
BUN BLDV-MCNC: 13 MG/DL (ref 7–22)
CALCIUM SERPL-MCNC: 8.4 MG/DL (ref 8.5–10.5)
CHLORIDE BLD-SCNC: 104 MEQ/L (ref 98–111)
CO2: 24 MEQ/L (ref 23–33)
CREAT SERPL-MCNC: 0.7 MG/DL (ref 0.4–1.2)
GFR SERPL CREATININE-BSD FRML MDRD: > 90 ML/MIN/1.73M2
GLUCOSE BLD-MCNC: 105 MG/DL (ref 70–108)
LV EF: 25 %
LVEF MODALITY: NORMAL
MAGNESIUM: 2.1 MG/DL (ref 1.6–2.4)
POTASSIUM SERPL-SCNC: 3.9 MEQ/L (ref 3.5–5.2)
SODIUM BLD-SCNC: 141 MEQ/L (ref 135–145)

## 2017-12-13 PROCEDURE — 80048 BASIC METABOLIC PNL TOTAL CA: CPT

## 2017-12-13 PROCEDURE — 5A2204Z RESTORATION OF CARDIAC RHYTHM, SINGLE: ICD-10-PCS | Performed by: INTERNAL MEDICINE

## 2017-12-13 PROCEDURE — 83735 ASSAY OF MAGNESIUM: CPT

## 2017-12-13 PROCEDURE — 93325 DOPPLER ECHO COLOR FLOW MAPG: CPT

## 2017-12-13 PROCEDURE — 99233 SBSQ HOSP IP/OBS HIGH 50: CPT | Performed by: INTERNAL MEDICINE

## 2017-12-13 PROCEDURE — 93312 ECHO TRANSESOPHAGEAL: CPT | Performed by: INTERNAL MEDICINE

## 2017-12-13 PROCEDURE — 2500000003 HC RX 250 WO HCPCS: Performed by: INTERNAL MEDICINE

## 2017-12-13 PROCEDURE — 6360000002 HC RX W HCPCS

## 2017-12-13 PROCEDURE — 71020 XR CHEST STANDARD TWO VW: CPT

## 2017-12-13 PROCEDURE — 1200000003 HC TELEMETRY R&B

## 2017-12-13 PROCEDURE — 36415 COLL VENOUS BLD VENIPUNCTURE: CPT

## 2017-12-13 PROCEDURE — B246ZZ4 ULTRASONOGRAPHY OF RIGHT AND LEFT HEART, TRANSESOPHAGEAL: ICD-10-PCS | Performed by: INTERNAL MEDICINE

## 2017-12-13 PROCEDURE — 2580000003 HC RX 258: Performed by: INTERNAL MEDICINE

## 2017-12-13 PROCEDURE — 6370000000 HC RX 637 (ALT 250 FOR IP): Performed by: INTERNAL MEDICINE

## 2017-12-13 PROCEDURE — 93320 DOPPLER ECHO COMPLETE: CPT

## 2017-12-13 PROCEDURE — 92960 CARDIOVERSION ELECTRIC EXT: CPT | Performed by: INTERNAL MEDICINE

## 2017-12-13 PROCEDURE — 2500000003 HC RX 250 WO HCPCS

## 2017-12-13 PROCEDURE — 99232 SBSQ HOSP IP/OBS MODERATE 35: CPT | Performed by: FAMILY MEDICINE

## 2017-12-13 PROCEDURE — 85730 THROMBOPLASTIN TIME PARTIAL: CPT

## 2017-12-13 PROCEDURE — 93312 ECHO TRANSESOPHAGEAL: CPT

## 2017-12-13 PROCEDURE — 6370000000 HC RX 637 (ALT 250 FOR IP)

## 2017-12-13 RX ORDER — METOPROLOL SUCCINATE 50 MG/1
50 TABLET, EXTENDED RELEASE ORAL ONCE
Status: COMPLETED | OUTPATIENT
Start: 2017-12-13 | End: 2017-12-13

## 2017-12-13 RX ORDER — BUMETANIDE 1 MG/1
0.5 TABLET ORAL DAILY
Status: DISCONTINUED | OUTPATIENT
Start: 2017-12-13 | End: 2017-12-15 | Stop reason: HOSPADM

## 2017-12-13 RX ORDER — SPIRONOLACTONE 25 MG/1
25 TABLET ORAL DAILY
Status: DISCONTINUED | OUTPATIENT
Start: 2017-12-13 | End: 2017-12-15 | Stop reason: HOSPADM

## 2017-12-13 RX ADMIN — AMIODARONE HYDROCHLORIDE 400 MG: 200 TABLET ORAL at 19:54

## 2017-12-13 RX ADMIN — SPIRONOLACTONE 25 MG: 25 TABLET ORAL at 08:22

## 2017-12-13 RX ADMIN — SPIRONOLACTONE 25 MG: 25 TABLET ORAL at 14:26

## 2017-12-13 RX ADMIN — Medication 10 ML: at 19:54

## 2017-12-13 RX ADMIN — APIXABAN 5 MG: 5 TABLET, FILM COATED ORAL at 19:53

## 2017-12-13 RX ADMIN — AMIODARONE HYDROCHLORIDE 400 MG: 200 TABLET ORAL at 08:22

## 2017-12-13 RX ADMIN — LISINOPRIL 2.5 MG: 2.5 TABLET ORAL at 08:22

## 2017-12-13 RX ADMIN — BUMETANIDE 0.5 MG: 1 TABLET ORAL at 14:26

## 2017-12-13 RX ADMIN — Medication 10 ML: at 08:23

## 2017-12-13 RX ADMIN — BUMETANIDE 0.5 MG: 0.25 INJECTION INTRAMUSCULAR; INTRAVENOUS at 09:41

## 2017-12-13 RX ADMIN — METOPROLOL SUCCINATE 50 MG: 50 TABLET, FILM COATED, EXTENDED RELEASE ORAL at 14:26

## 2017-12-13 RX ADMIN — METOPROLOL SUCCINATE 50 MG: 50 TABLET, FILM COATED, EXTENDED RELEASE ORAL at 08:22

## 2017-12-13 RX ADMIN — METOPROLOL SUCCINATE 75 MG: 50 TABLET, FILM COATED, EXTENDED RELEASE ORAL at 19:53

## 2017-12-13 RX ADMIN — APIXABAN 5 MG: 5 TABLET, FILM COATED ORAL at 14:25

## 2017-12-13 NOTE — PROGRESS NOTES
for HAVEN/CV tomorrow - cont cardizem gtt, heparin gtt, toprol, amiodarone - cont with IV diuresis with bumex - cont monitor lytes, HR, BP. Plan d/w pt and parents at bedside. Chief Complaint: sob    Hospital Course: Tavon Escalante is a 61 y.o. male without any prior medical hx admitted with new onset afib with RVR and CHF -- started on heparin gtt, cardizem gtt and cardiology consulted. Attempt at HAVEN/CV failed 12/13. Medications adjusted per cardio and LV ordered for CM. Subjective:   -- 12/13 --> unable to be cardioverted today - off cardizem and po meds adjusted per cardio and HR up to 100's. Pt states he still is feeling better. Diuresing well with bumex. No cp. Breathing better. No lightheaded/dizziness. No n/v/f/c. Puneet po, afeb. Medications:  Reviewed    Infusion Medications    Scheduled Medications    bumetanide  0.5 mg Oral Daily    spironolactone  25 mg Oral Daily    metoprolol succinate  75 mg Oral BID    apixaban  5 mg Oral BID    lisinopril  2.5 mg Oral Daily    amiodarone  400 mg Oral BID    sodium chloride flush  10 mL Intravenous 2 times per day     PRN Meds: sodium chloride flush, acetaminophen      Intake/Output Summary (Last 24 hours) at 12/13/17 1612  Last data filed at 12/13/17 1118   Gross per 24 hour   Intake              925 ml   Output              225 ml   Net              700 ml       Diet:  DIET CARDIAC; Exam:  /78   Pulse 70   Temp 98 °F (36.7 °C) (Oral)   Resp 17   Ht 5' 9\" (1.753 m)   Wt 232 lb 9.6 oz (105.5 kg)   SpO2 97%   BMI 34.35 kg/m²     General appearance: No apparent distress, appears stated age and cooperative. HEENT: Pupils equal, round, and reactive to light. Conjunctivae/corneas clear. Neck: Supple, with full range of motion. No jugular venous distention. Trachea midline. Respiratory:  Normal respiratory effort. Clear to auscultation, bilaterally without Rales/Wheezes/Rhonchi.   No respiratory distress or accessory muscle use.  Cardiovascular: irreg, irreg with normal S1/S2 without murmurs, rubs or gallops. Abdomen: Soft, non-tender, non-distended with normal bowel sounds. No rebound or guarding  Musculoskeletal: 1+ BLE edema,  Full range of motion without deformity. No calf tenderness palpation  Skin: Skin color, texture, turgor normal.  No rashes or lesions. Neurologic:  Neurovascularly intact without any focal sensory/motor deficits. Cranial nerves: II-XII intact, grossly non-focal.  Psychiatric: Alert and oriented, thought content appropriate, irritable  Capillary Refill: Brisk,< 3 seconds   Peripheral Pulses: +2 palpable, equal bilaterally       Labs:   Recent Labs      12/11/17   1015  12/12/17   0509   WBC  16.4*  10.8   HGB  18.7*  17.3   HCT  56.6*  50.8   PLT  141  116*     Recent Labs      12/11/17   1015  12/12/17   0509  12/13/17   0453   NA  140  139  141   K  4.2  4.1  3.9   CL  100  101  104   CO2  26  25  24   BUN  18  13  13   CREATININE  0.9  0.8  0.7   CALCIUM  9.0  8.5  8.4*     Recent Labs      12/11/17   1015   AST  29   ALT  48   BILIDIR  0.5*   BILITOT  2.4*   ALKPHOS  57     Recent Labs      12/11/17   1015   INR  1.00     No results for input(s): Ova April in the last 72 hours. Urinalysis:    Lab Results   Component Value Date    NITRU NEGATIVE 12/11/2017    BLOODU NEGATIVE 12/11/2017    GLUCOSEU NEGATIVE 12/11/2017       Radiology:  XR CHEST STANDARD (2 VW)   Final Result   Small bilateral pleural effusions. No pulmonary infiltrates. **This report has been created using voice recognition software. It may contain minor errors which are inherent in voice recognition technology. **      Final report electronically signed by Dr. Misty Philip on 12/13/2017 7:39 AM      CTA CHEST [address]   Final Result       1. No pulmonary emboli or pulmonary infiltrates. 2. There is a small pleural effusion on the right with a trace pleural effusion on the left.       3. A trace amount

## 2017-12-13 NOTE — PLAN OF CARE
Problem: Discharge Planning:  Goal: Participates in care planning  Participates in care planning   Outcome: Ongoing  Patient participates in care planning and discharge goals. Plans to be discharged home with support. Problem: Cardiac Output - Decreased:  Goal: Hemodynamic stability will improve  Hemodynamic stability will improve   Outcome: Ongoing  Vitals:    12/13/17 1530   BP: 106/78   Pulse: 70   Resp: 17   Temp: 98 °F (36.7 °C)   SpO2: 97%     Lab Results   Component Value Date    WBC 10.8 12/12/2017    HGB 17.3 12/12/2017    HCT 50.8 12/12/2017    MCV 98.6 (H) 12/12/2017     (L) 12/12/2017     Patient had cardioversion with HAVEN unsuccessful patient remains in afib     Problem: Fluid Volume - Imbalance:  Goal: Absence of imbalanced fluid volume signs and symptoms  Absence of imbalanced fluid volume signs and symptoms   Outcome: Ongoing  I/O last 3 completed shifts: In: 4389 [P.O.:440; I.V.:1312]  Out: 225 [Urine:225]  No intake/output data recorded. Monitoring intake and outputs every 8 hours      Problem: Pain:  Goal: Pain level will decrease  Pain level will decrease   Outcome: Ongoing  Patient's pain continues to controlled with prescribed pain medication. Denies pain this shift. Problem: Tissue Perfusion - Cardiopulmonary, Altered:  Goal: Absence of angina  Absence of angina   Outcome: Ongoing  Patient denies pain. Problem: Falls - Risk of:  Goal: Will remain free from falls  Will remain free from falls   Outcome: Ongoing  Falling star program in place, magnet on door, slip resistant socks on when patient is out of bed, bed and chair alarms are off per patient refuses. Patient is using call light appropriately. Will continue hourly rounding and reassessing risk score. Comments: Care plan reviewed with patient and family. Patient and family verbalize understanding of the plan of care and contribute to goal setting.

## 2017-12-13 NOTE — PROCEDURES
atrial  fibrillation, so after that basically, we terminated the procedure and give  him reversal and flumazenil and Narcan 0.4 mg IV each and the patient  regained his consciousness, alert, awake, oriented, and stable. CONCLUSION:  1. Unsuccessful electrical cardioversion for atrial fibrillation attempted  with 250 joules and 300 joules, the patient goes back to normal sinus  rhythm and right go back into atrial fibrillation after 1 to 2 minutes. 2.  No complication. POST-PROCEDURE:  The patient is alert, awake, oriented, and stable. PLAN AND RECOMMENDATIONS:  Continue the anticoagulation and I will start  him on amiodarone 400 twice a day and after 1 to 2 weeks of amiodarone, one  can try synchronized cardioversion. [facility].  Kellie Rodriguez M.D.    D: 12/13/2017 13:18:15       T: 12/13/2017 13:53:29     [email or web address]  Job#: [id number]     Doc#: [id number]    CC:

## 2017-12-13 NOTE — PRE SEDATION
[address] [address]  Sedation/Analgesia History & Physical    Pt Name: Alicia Bullard  MRN: [id number]  YOB: 1960  Provider Performing Procedure: Tk Woods  Primary Care Physician: Lynnea Bosworth, CNP    PRE-PROCEDURE   DNR-CCA/DNR-CC []Yes [x]No  Brief History/Pre-Procedure Diagnosis:  New onset atr fib   Need cardioversion  Need HAVEN-CV    Risk and benefit of HAVEN-CV well explained and pat verbalized understanding and agreed          MEDICAL HISTORY  []CAD/Valve  []Liver Disease  []Lung Disease []Diabetes  []Hypertension []Renal Disease  []Additional information:       has a past medical history of Atrial fibrillation ([facility] [address] SURGICAL HISTORY   has a past surgical history that includes Cholecystectomy.   Additional information:       ALLERGIES   Allergies as of 12/11/2017 - Review Complete 12/11/2017   Allergen Reaction Noted    Augmentin [amoxicillin-pot clavulanate] Shortness Of Breath and Swelling 12/11/2017     Additional information:       MEDICATIONS   Coumadin Use Last 5 Days [x]No []Yes  Antiplatelet drug therapy use last 5 days  []No [x]Yes  Other anticoagulant use last 5 days  [x]No []Yes    Current Facility-Administered Medications:     metoprolol succinate (TOPROL XL) extended release tablet 50 mg, 50 mg, Oral, BID, Tk Woods MD, 50 mg at 12/13/17 6500    lisinopril (PRINIVIL;ZESTRIL) tablet 2.5 mg, 2.5 mg, Oral, Daily, Tk Woods MD, 2.5 mg at 12/13/17 5627    amiodarone (CORDARONE) tablet 400 mg, 400 mg, Oral, BID, Tk Woods MD, 400 mg at 12/13/17 8368    diltiazem 125 mg in dextrose 5 % 125 mL infusion, 5 mg/hr, Intravenous, Continuous, Mary Craig MD, Stopped at 12/13/17 1030    sodium chloride flush 0.9 % injection 10 mL, 10 mL, Intravenous, 2 times per day, dAitya Mauro MD, 10 mL at 12/13/17 7405    sodium chloride flush 0.9 % injection 10 mL, 10 mL, Intravenous, PRN, Aditya Muaro MD    acetaminophen (TYLENOL) tablet 650 mg, 650 mg, appropriate candidate to undergo the planned procedure sedation and anesthesia. (Refer to nursing sedation/analgesia documentation record)  [x]Formulation and discussion of sedation/procedure plan, risks, and expectations with patient and/or responsible adult completed. [x]Patient examined immediately prior to the procedure.  (Refer to nursing sedation/analgesia documentation record)    Marcela Norman  Electronically signed 12/13/2017 at 12:02 PM

## 2017-12-13 NOTE — PROGRESS NOTES
Chief Complaint   Patient presents with    Atrial Fibrillation     RVR       ADMITTED   REASON FOR CONSULTATION:  Evaluation for atrial fibrillation with rapid  ventricular response and cardiomyopathy with ejection fraction 20% to 25%.   PRESENTED WITH LEG EDEMA AND SOB    TODAY  Sob BETTER AND LEG EDEMA BETTER    FAILED DC [address]      Patient Active Problem List   Diagnosis    [facility] fibrillation with RVR ([facility] [address]    Acute systolic congestive heart failure ([facility] [address]    [facility]    Encounter for cardioversion procedure       Current Facility-Administered Medications   Medication Dose Route Frequency Provider Last Rate Last Dose    bumetanide (BUMEX) tablet 0.5 mg  0.5 mg Oral Daily Adam Moses MD        spironolactone (ALDACTONE) tablet 25 mg  25 mg Oral Daily Adam Moses MD        metoprolol succinate (TOPROL XL) extended release tablet 50 mg  50 mg Oral Once Aadm Moses MD        metoprolol succinate (TOPROL XL) extended release tablet 75 mg  75 mg Oral BID Adam Moses MD        apixaban (ELIQUIS) tablet 5 mg  5 mg Oral BID Adam Moses MD        lisinopril (PRINIVIL;ZESTRIL) tablet 2.5 mg  2.5 mg Oral Daily Adam Moses MD   2.5 mg at 12/13/17 8600    amiodarone (CORDARONE) tablet 400 mg  400 mg Oral BID Adam Moses MD   400 mg at 12/13/17 9082    sodium chloride flush 0.9 % injection 10 mL  10 mL Intravenous 2 times per day Dariel Reza MD   10 mL at 12/13/17 2826    sodium chloride flush 0.9 % injection 10 mL  10 mL Intravenous PRN Dariel Reza MD        acetaminophen (TYLENOL) tablet 650 mg  650 mg Oral Q4H PRN Dariel Reza MD              Review of Systems -     The rest of  ROS: negative    Blood pressure 108/73, pulse 100, temperature 98 °F (36.7 °C), temperature source Oral, resp. rate 18, height 5' 9\" (1.753 m), weight 232 lb 9.6 oz (105.5 kg), SpO2 97 %.     Physical Examination:    General appearance - alert, well appearing, and in no distress  Mental status - alert, oriented to person, place, and time  Neck - supple, no significant adenopathy, no JVD, or carotid bruits  Chest - clear to auscultation, no wheezes, rales or rhonchi, symmetric air entry  Heart - normal rate, regular rhythm, normal S1, S2, no murmurs, rubs, clicks or gallops  Abdomen - soft, nontender, nondistended, no masses or organomegaly  Neurological - alert, oriented, normal speech, no focal findings or movement disorder noted  Musculoskeletal - no joint tenderness, deformity or swelling  Extremities - peripheral pulses normal, no pedal edema, no clubbing or cyanosis  Skin - normal coloration and turgor, no rashes, no suspicious skin lesions noted    Lab  No results for input(s): CKTOTAL, CKMB, CKMBINDEX, TROPONINI in the last 72 hours.   CBC: Lab Results   Component Value Date    WBC 10.8 12/12/2017    RBC 5.15 12/12/2017    HGB 17.3 12/12/2017    HCT 50.8 12/12/2017    MCV 98.6 12/12/2017    MCH 33.5 12/12/2017    MCHC 34.0 12/12/2017    RDW 14.9 12/12/2017     12/12/2017    MPV 9.9 12/12/2017     BMP:  Lab Results   Component Value Date     12/13/2017    K 3.9 12/13/2017     12/13/2017    CO2 24 12/13/2017    BUN 13 12/13/2017    LABALBU 4.3 12/11/2017    CREATININE 0.7 12/13/2017    CALCIUM 8.4 12/13/2017    LABGLOM >90 12/13/2017    GLUCOSE 105 12/13/2017     Hepatic Function Panel:  Lab Results   Component Value Date    ALKPHOS 57 12/11/2017    ALT 48 12/11/2017    AST 29 12/11/2017    PROT 6.7 12/11/2017    BILITOT 2.4 12/11/2017    BILIDIR 0.5 12/11/2017    LABALBU 4.3 12/11/2017     Magnesium:    Lab Results   Component Value Date    MG 2.1 12/13/2017     Warfarin PT/INR:  No components found for: PTPATWAR, PTINRWAR  HgBA1c:  No results found for: LABA1C  FLP:  No results found for: TRIG, HDL, LDLCALC, LDLDIRECT, LABVLDL  TSH:    Lab Results   Component Value Date    TSH 4.660 12/11/2017     No components found for: CBLOOD, CFUNGUSBL     Intake/Output

## 2017-12-14 LAB
ANION GAP SERPL CALCULATED.3IONS-SCNC: 12 MEQ/L (ref 8–16)
BUN BLDV-MCNC: 12 MG/DL (ref 7–22)
CALCIUM SERPL-MCNC: 8.8 MG/DL (ref 8.5–10.5)
CHLORIDE BLD-SCNC: 100 MEQ/L (ref 98–111)
CO2: 26 MEQ/L (ref 23–33)
CREAT SERPL-MCNC: 0.7 MG/DL (ref 0.4–1.2)
GFR SERPL CREATININE-BSD FRML MDRD: > 90 ML/MIN/1.73M2
GLUCOSE BLD-MCNC: 104 MG/DL (ref 70–108)
MAGNESIUM: 2.1 MG/DL (ref 1.6–2.4)
POTASSIUM SERPL-SCNC: 4 MEQ/L (ref 3.5–5.2)
SODIUM BLD-SCNC: 138 MEQ/L (ref 135–145)

## 2017-12-14 PROCEDURE — 36415 COLL VENOUS BLD VENIPUNCTURE: CPT

## 2017-12-14 PROCEDURE — 6360000002 HC RX W HCPCS: Performed by: INTERNAL MEDICINE

## 2017-12-14 PROCEDURE — 83735 ASSAY OF MAGNESIUM: CPT

## 2017-12-14 PROCEDURE — 99233 SBSQ HOSP IP/OBS HIGH 50: CPT | Performed by: INTERNAL MEDICINE

## 2017-12-14 PROCEDURE — 80048 BASIC METABOLIC PNL TOTAL CA: CPT

## 2017-12-14 PROCEDURE — 6370000000 HC RX 637 (ALT 250 FOR IP): Performed by: FAMILY MEDICINE

## 2017-12-14 PROCEDURE — 6370000000 HC RX 637 (ALT 250 FOR IP): Performed by: INTERNAL MEDICINE

## 2017-12-14 PROCEDURE — 1200000003 HC TELEMETRY R&B

## 2017-12-14 PROCEDURE — 99233 SBSQ HOSP IP/OBS HIGH 50: CPT | Performed by: FAMILY MEDICINE

## 2017-12-14 PROCEDURE — 2580000003 HC RX 258: Performed by: INTERNAL MEDICINE

## 2017-12-14 PROCEDURE — 93005 ELECTROCARDIOGRAM TRACING: CPT

## 2017-12-14 RX ORDER — THIAMINE MONONITRATE (VIT B1) 100 MG
100 TABLET ORAL DAILY
Status: DISCONTINUED | OUTPATIENT
Start: 2017-12-14 | End: 2017-12-15 | Stop reason: HOSPADM

## 2017-12-14 RX ORDER — LORAZEPAM 2 MG/ML
2 INJECTION INTRAMUSCULAR
Status: DISCONTINUED | OUTPATIENT
Start: 2017-12-14 | End: 2017-12-15 | Stop reason: HOSPADM

## 2017-12-14 RX ORDER — FOLIC ACID 1 MG/1
1 TABLET ORAL DAILY
Status: DISCONTINUED | OUTPATIENT
Start: 2017-12-14 | End: 2017-12-15 | Stop reason: HOSPADM

## 2017-12-14 RX ORDER — METOPROLOL SUCCINATE 100 MG/1
100 TABLET, EXTENDED RELEASE ORAL 2 TIMES DAILY
Status: DISCONTINUED | OUTPATIENT
Start: 2017-12-14 | End: 2017-12-15 | Stop reason: HOSPADM

## 2017-12-14 RX ORDER — MULTIVITAMIN WITH FOLIC ACID 400 MCG
1 TABLET ORAL DAILY
Status: DISCONTINUED | OUTPATIENT
Start: 2017-12-14 | End: 2017-12-15 | Stop reason: HOSPADM

## 2017-12-14 RX ORDER — LORAZEPAM 2 MG/ML
3 INJECTION INTRAMUSCULAR
Status: DISCONTINUED | OUTPATIENT
Start: 2017-12-14 | End: 2017-12-15 | Stop reason: HOSPADM

## 2017-12-14 RX ORDER — LORAZEPAM 2 MG/ML
1 INJECTION INTRAMUSCULAR
Status: DISCONTINUED | OUTPATIENT
Start: 2017-12-14 | End: 2017-12-15 | Stop reason: HOSPADM

## 2017-12-14 RX ORDER — LORAZEPAM 2 MG/ML
4 INJECTION INTRAMUSCULAR
Status: DISCONTINUED | OUTPATIENT
Start: 2017-12-14 | End: 2017-12-15 | Stop reason: HOSPADM

## 2017-12-14 RX ORDER — DIGOXIN 0.25 MG/ML
500 INJECTION INTRAMUSCULAR; INTRAVENOUS ONCE
Status: COMPLETED | OUTPATIENT
Start: 2017-12-14 | End: 2017-12-14

## 2017-12-14 RX ORDER — DIGOXIN 125 MCG
125 TABLET ORAL DAILY
Status: DISCONTINUED | OUTPATIENT
Start: 2017-12-14 | End: 2017-12-15 | Stop reason: HOSPADM

## 2017-12-14 RX ORDER — LORAZEPAM 2 MG/1
2 TABLET ORAL
Status: DISCONTINUED | OUTPATIENT
Start: 2017-12-14 | End: 2017-12-15 | Stop reason: HOSPADM

## 2017-12-14 RX ORDER — LORAZEPAM 1 MG/1
1 TABLET ORAL
Status: DISCONTINUED | OUTPATIENT
Start: 2017-12-14 | End: 2017-12-15 | Stop reason: HOSPADM

## 2017-12-14 RX ORDER — LORAZEPAM 2 MG/1
4 TABLET ORAL
Status: DISCONTINUED | OUTPATIENT
Start: 2017-12-14 | End: 2017-12-15 | Stop reason: HOSPADM

## 2017-12-14 RX ADMIN — Medication 100 MG: at 14:39

## 2017-12-14 RX ADMIN — BUMETANIDE 0.5 MG: 1 TABLET ORAL at 08:10

## 2017-12-14 RX ADMIN — LORAZEPAM 2 MG: 2 TABLET ORAL at 20:31

## 2017-12-14 RX ADMIN — METOPROLOL SUCCINATE 75 MG: 50 TABLET, FILM COATED, EXTENDED RELEASE ORAL at 08:10

## 2017-12-14 RX ADMIN — LISINOPRIL 2.5 MG: 2.5 TABLET ORAL at 08:10

## 2017-12-14 RX ADMIN — AMIODARONE HYDROCHLORIDE 400 MG: 200 TABLET ORAL at 08:10

## 2017-12-14 RX ADMIN — DIGOXIN 125 MCG: 0.12 TABLET ORAL at 14:39

## 2017-12-14 RX ADMIN — LORAZEPAM 2 MG: 2 TABLET ORAL at 14:39

## 2017-12-14 RX ADMIN — THERA TABS 1 TABLET: TAB at 14:39

## 2017-12-14 RX ADMIN — METOPROLOL SUCCINATE 25 MG: 100 TABLET, FILM COATED, EXTENDED RELEASE ORAL at 08:44

## 2017-12-14 RX ADMIN — METOPROLOL SUCCINATE 100 MG: 100 TABLET, FILM COATED, EXTENDED RELEASE ORAL at 20:28

## 2017-12-14 RX ADMIN — AMIODARONE HYDROCHLORIDE 400 MG: 200 TABLET ORAL at 20:28

## 2017-12-14 RX ADMIN — Medication 10 ML: at 08:11

## 2017-12-14 RX ADMIN — Medication 10 ML: at 20:28

## 2017-12-14 RX ADMIN — FOLIC ACID 1 MG: 1 TABLET ORAL at 14:39

## 2017-12-14 RX ADMIN — LORAZEPAM 1 MG: 1 TABLET ORAL at 11:04

## 2017-12-14 RX ADMIN — APIXABAN 5 MG: 5 TABLET, FILM COATED ORAL at 08:10

## 2017-12-14 RX ADMIN — DIGOXIN 500 MCG: 0.25 INJECTION INTRAMUSCULAR; INTRAVENOUS at 11:04

## 2017-12-14 RX ADMIN — APIXABAN 5 MG: 5 TABLET, FILM COATED ORAL at 20:28

## 2017-12-14 RX ADMIN — SPIRONOLACTONE 25 MG: 25 TABLET ORAL at 08:10

## 2017-12-14 NOTE — PROGRESS NOTES
Patient is refusing to wear life vest, which was fitted for him earlier today. When this RN went into patient's room to do vitals and assess the patient at around 2010, the patient already had the vest back in the case and his telemetry on. The patient states that \" I only have this because I have good insurance\" and that any another person wouldn't have this in his situation. Patient states that he does not need the vest and that he can't wear it because he will break it into pieces, even if he only wears it for 1 week. 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 This RN educated patient thoroughly on the uses of this vest and of the importance of it. Patient is still adamant that he does not want to wear it and that he will talk with Dr. Federica Fischer tomorrow and will wear it if he absolutely insists that it is necessary. Patient appears to not have a full understanding of the uses and importance of the vest, even after educating him multiple times. Patient currently has telemetry on and is being monitored closely.

## 2017-12-14 NOTE — PLAN OF CARE
Problem: Discharge Planning:  Goal: Participates in care planning  Participates in care planning   Outcome: Ongoing  Patient is requiring constant reinforcement of education on medications, heart rhythms, and expected outcomes. Patient is anxious to leave and agitated about medications, tests, and his current situation. Problem: Cardiac Output - Decreased:  Goal: Hemodynamic stability will improve  Hemodynamic stability will improve   Outcome: Ongoing  Patient heart remains in afib with rate climbing to 160 for hours this shift. Dig was started and CIWA which has since seen a decline in heart rate to range . Vitals:    12/14/17 1203   BP: 112/78   Pulse: 104   Resp:    Temp:    SpO2:      Lab Results   Component Value Date    WBC 10.8 12/12/2017    HGB 17.3 12/12/2017    HCT 50.8 12/12/2017    MCV 98.6 (H) 12/12/2017     (L) 12/12/2017     Lab Results   Component Value Date     12/14/2017    K 4.0 12/14/2017     12/14/2017    CO2 26 12/14/2017    BUN 12 12/14/2017    CREATININE 0.7 12/14/2017    GLUCOSE 104 12/14/2017    CALCIUM 8.8 12/14/2017          Problem: Fluid Volume - Imbalance:  Goal: Absence of imbalanced fluid volume signs and symptoms  Absence of imbalanced fluid volume signs and symptoms   Outcome: Ongoing  I/O last 3 completed shifts: In: 1190.4 [P.O.:890; I.V.:300.4]  Out: 800 [Urine:800]  I/O this shift: In: 520 [P.O.:520]  Out: -       Monitoring intake and outputs every 8 hours      Problem: Pain:  Goal: Pain level will decrease  Pain level will decrease   Outcome: Ongoing  Denies pain. Problem: Tissue Perfusion - Cardiopulmonary, Altered:  Goal: Absence of angina  Absence of angina   Outcome: Ongoing  Denies chest pain or shortness of breath    Problem: Falls - Risk of:  Goal: Will remain free from falls  Will remain free from falls   Outcome: Ongoing  Patient refuses alarms.  Falling star program in place, magnet on door, slip resistant socks on when patient is out of bed, bed and chair alarms are off. Patient is using call light appropriately. Will continue hourly rounding and reassessing risk score. Comments: Care plan reviewed with patient and family. Patient and family verbalize understanding of the plan of care and contribute to goal setting.

## 2017-12-14 NOTE — PROGRESS NOTES
Patient placed call light on, when RN entered the room to answer the patient was taking his life vest off and state he couldn't stand to wear it. It was explained that it was a life preserving measure that could prevent a lethal rhythm leading to death. The patient responded he did not understand why he needed to wear it if his heart was already out of normal rhythm. We then reviewed the different types of rhythm possible and the consequences of his heart going into a sustained VTACH or VFIb. The patient allowed me to refasten the life vest on him and stated he couldn't imagine wearing it for 90days. I explained it could save his life if he could manage to accept it even for just short term. He stated he \"will not wear it. \" When this RN left the room the patient was wearing the life vest along with the telemetry monitor.

## 2017-12-14 NOTE — PROGRESS NOTES
URI -- CTA 12/11 (-) infiltrates, PCT 0.18 - afeb, WBC improved - no atbx - ?viral  10. Mild/mod MR -- per echo 12/11/17  11. Obesity Body mass index is 33.76 kg/m². 12. Alcohol abuse -- daily ETOH per report - monitor for withdrawal - lytes stable -- added vits, CIWA 12/14 with above. Discussed need to stop drinking with his CM and afib and risk of death. DIspo  -- 12/14 --> apprec cardio assist -- HR up to 140's, new NSVT overnight/am - dig loaded and po started per cardio - ?need amio gtt - toprol increased per cardio - pt very irritable/anxious  - ?etoh withdrawal - add vits and CIWA -- explained risk of SCD with NSVT and EF 25% and importance of lifevest and medications and observation. Cont monitor lytes, HR, BP closely. -- 12/13 --> apprec cardio assist -- HAVEN/CV unsuccessful - meds adjusted per Dr. Luna Mejia and eliquis started - lifevest ordered and to be fitted this pm.  Cont monitor lytes. Diuretics per cardio. Per RN per family/friends pt drinks ETOH daily - monitor for any withdrawal.    -- 12/12 --> d/w Dr. Luna Mejia - apprec cardio assist -- plan for HAVEN/CV tomorrow - cont cardizem gtt, heparin gtt, toprol, amiodarone - cont with IV diuresis with bumex - cont monitor lytes, HR, BP. Plan d/w pt and parents at bedside. Chief Complaint: sob    Hospital Course: Rosemarie Saunders is a 61 y.o. male without any prior medical hx admitted with new onset afib with RVR and CHF -- started on heparin gtt, cardizem gtt and cardiology consulted. Attempt at HAVEN/CV failed 12/13. Medications adjusted per cardio and LV ordered for CM. Subjective:   -- 12/14 --> upset today, irritable. Initially refused lifevest and now agrees after talking with Dr. Luna Mejia this am.  Loreda Debar into afib with RVR to 150 last pm with NSVT episodes. Denies sob. No cp. No lightheaded/dizziness. No n/v/f/c. Puneet po, afeb.       Medications:  Reviewed    Infusion Medications    Scheduled Medications    metoprolol succinate  100 mg Refill: Brisk,< 3 seconds   Peripheral Pulses: +2 palpable, equal bilaterally       Labs:   Recent Labs      12/12/17   0509   WBC  10.8   HGB  17.3   HCT  50.8   PLT  116*     Recent Labs      12/12/17   0509  12/13/17   0453  12/14/17   0734   NA  139  141  138   K  4.1  3.9  4.0   CL  101  104  100   CO2  25  24  26   BUN  13  13  12   CREATININE  0.8  0.7  0.7   CALCIUM  8.5  8.4*  8.8     No results for input(s): AST, ALT, BILIDIR, BILITOT, ALKPHOS in the last 72 hours. No results for input(s): INR in the last 72 hours. No results for input(s): Ermelinda Bickers in the last 72 hours. Urinalysis:    Lab Results   Component Value Date    NITRU NEGATIVE 12/11/2017    BLOODU NEGATIVE 12/11/2017    GLUCOSEU NEGATIVE 12/11/2017       Radiology:  XR CHEST STANDARD (2 VW)   Final Result   Small bilateral pleural effusions. No pulmonary infiltrates. **This report has been created using voice recognition software. It may contain minor errors which are inherent in voice recognition technology. **      Final report electronically signed by Dr. Fabricio Nicolas on 12/13/2017 7:39 AM      CTA CHEST W [address]   Final Result       1. No pulmonary emboli or pulmonary infiltrates. 2. There is a small pleural effusion on the right with a trace pleural effusion on the left. 3. A trace amount of ascites is noted along the lateral aspect of the liver. **This report has been created using voice recognition software. It may contain minor errors which are inherent in voice recognition technology. **      Final report electronically signed by Dr. Justino Adrian on 12/11/2017 12:29 PM      XR Chest Portable   Final Result   NO EVIDENCE OF ACUTE CARDIOPULMONARY PROCESS. **This report has been created using voice recognition software. It may contain minor errors which are inherent in voice recognition technology. **            Final report electronically signed by Dr. Estefania Jeffries on 12/11/2017 11:33 AM      VL DUP LOWER EXTREMITY VENOUS RIGHT   Final Result   No evidence of deep venous thrombosis in the right lower extremity. Final report electronically signed by Dr. Rox Beltrán on 12/11/2017 11:03 AM          Diet: DIET CARDIAC;     Verdugo: none    Microbiology:  Blood cx 12/11 (-) to date    Influenza 12/11 (-)    Tele:   afib - rate to 130-150's at times last 24 hrs and am 12/14 - multiple runs of VT 5-6 beats and 1 episode of 13 beats    DVT prophylaxis: [] Lovenox                                 [] SCDs                                 [] SQ Heparin                                 [] Encourage ambulation           [x] Already on Anticoagulation -- eliquis     Disposition:    [x] Home       [] TCU       [] Rehab       [] Psych       [] SNF       [] [place]       [] Other-    Code Status: Full Code    PT/OT Eval Status: not needed          Electronically signed by María Guerra MD on 12/14/2017 at 12:03 PM when pt evaluated - final note filed late

## 2017-12-14 NOTE — PROGRESS NOTES
temperature 97.3 °F (36.3 °C), temperature source Oral, resp. rate 16, height 5' 9\" (1.753 m), weight 227 lb 4.8 oz (103.1 kg), SpO2 94 %. Physical Examination:    General appearance - alert, well appearing, and in no distress  Mental status - alert, oriented to person, place, and time  Neck - supple, no significant adenopathy, no JVD, or carotid bruits  Chest - clear to auscultation, no wheezes, rales or rhonchi, symmetric air entry  Heart - normal rate, regular rhythm, normal S1, S2, no murmurs, rubs, clicks or gallops  Abdomen - soft, nontender, nondistended, no masses or organomegaly  Neurological - alert, oriented, normal speech, no focal findings or movement disorder noted  Musculoskeletal - no joint tenderness, deformity or swelling  Extremities - peripheral pulses normal, no pedal edema, no clubbing or cyanosis  Skin - normal coloration and turgor, no rashes, no suspicious skin lesions noted    Lab  No results for input(s): CKTOTAL, CKMB, CKMBINDEX, TROPONINI in the last 72 hours.   CBC:   Lab Results   Component Value Date    WBC 10.8 12/12/2017    RBC 5.15 12/12/2017    HGB 17.3 12/12/2017    HCT 50.8 12/12/2017    MCV 98.6 12/12/2017    MCH 33.5 12/12/2017    MCHC 34.0 12/12/2017    RDW 14.9 12/12/2017     12/12/2017    MPV 9.9 12/12/2017     BMP:    Lab Results   Component Value Date     12/14/2017    K 4.0 12/14/2017     12/14/2017    CO2 26 12/14/2017    BUN 12 12/14/2017    LABALBU 4.3 12/11/2017    CREATININE 0.7 12/14/2017    CALCIUM 8.8 12/14/2017    LABGLOM >90 12/14/2017    GLUCOSE 104 12/14/2017     Hepatic Function Panel:    Lab Results   Component Value Date    ALKPHOS 57 12/11/2017    ALT 48 12/11/2017    AST 29 12/11/2017    PROT 6.7 12/11/2017    BILITOT 2.4 12/11/2017    BILIDIR 0.5 12/11/2017    LABALBU 4.3 12/11/2017     Magnesium:    Lab Results   Component Value Date    MG 2.1 12/14/2017     Warfarin PT/INR:  No components found for: PTPATWAR, PTINRWAR  HgBA1c:  No results found for: LABA1C  FLP:  No results found for: TRIG, HDL, LDLCALC, LDLDIRECT, LABVLDL  TSH:    Lab Results   Component Value Date    TSH 4.660 12/11/2017     No components found for: CBLOOD, CFUNGUSBL       Intake/Output Summary (Last 24 hours) at 12/14/17 0845  Last data filed at 12/14/17 0342   Gross per 24 hour   Intake          1190.36 ml   Output              800 ml   Net           390.36 ml     No results for input(s): PHART, JBM9FPK, PO2ART in the last 72 hours. Assessment  Patient Active Problem List    Diagnosis Date Noted    Acute systolic congestive heart failure ([facility]     Cardiomyopathy ([facility]     Encounter for cardioversion procedure     [facility] 12/11/2017     ASSESSMENT:  Episodes of NSVT 13 beat and 160 bpm longest overnight  S/p ATTEMPTED cARDIOVERSION  Leg edema +1 better  1. Acute systolic congestive heart failure exacerbation with leg edema,  elevated BNP, chest x-ray abnormality, and his BNP is 2274.  2.  Severe cardiomyopathy. PROBABLY TACHY MEDIATED  Ejection fraction 25% to 30%, that is also  newly diagnosed. 3.  Atrial fibrillation with rapid ventricular response newly diagnosed,  not felt much by the patient. Rate was 201. Bilateral leg edema of +2.  4.  Elevated BNP more than 2000.  5.  Mild obesity.       Plan   CONT GENTLE DIURESIS oRAL  START PO BUMEX AND ALDACTONE    ATR FIB rvr  NEED BETTER RAte control  Increase toprol xl to 100 bid ( give extra toprol xl 50 po x1 now )  Cont  apixaban 5 bid  Cont amiodarone  Consider cardioversion down the line after loading with amiodaron for 1 to 2 weeks  Need better rate control  Add digoxin 0.5 iv x1 the 0.125 mg po qd    cmp-TOPROL XL AND LSINOPRIL  Out pat invasive ischemia work up  [facility]  Cardioversion after [employer]  Cath next friday    F/U cardiology in 1 week with bmp and mg    D/w the pat and nurse      Brenda Bush

## 2017-12-15 ENCOUNTER — TELEPHONE (OUTPATIENT)
Dept: FAMILY MEDICINE CLINIC | Age: 57
End: 2017-12-15

## 2017-12-15 VITALS
BODY MASS INDEX: 33.86 KG/M2 | RESPIRATION RATE: 16 BRPM | WEIGHT: 228.6 LBS | DIASTOLIC BLOOD PRESSURE: 78 MMHG | HEIGHT: 69 IN | SYSTOLIC BLOOD PRESSURE: 133 MMHG | HEART RATE: 84 BPM | TEMPERATURE: 97.7 F | OXYGEN SATURATION: 98 %

## 2017-12-15 LAB
EKG ATRIAL RATE: 170 BPM
EKG Q-T INTERVAL: 378 MS
EKG QRS DURATION: 92 MS
EKG QTC CALCULATION (BAZETT): 543 MS
EKG R AXIS: 100 DEGREES
EKG T AXIS: 70 DEGREES
EKG VENTRICULAR RATE: 124 BPM

## 2017-12-15 PROCEDURE — 99239 HOSP IP/OBS DSCHRG MGMT >30: CPT | Performed by: INTERNAL MEDICINE

## 2017-12-15 PROCEDURE — 2580000003 HC RX 258: Performed by: INTERNAL MEDICINE

## 2017-12-15 PROCEDURE — 6370000000 HC RX 637 (ALT 250 FOR IP): Performed by: FAMILY MEDICINE

## 2017-12-15 PROCEDURE — 99232 SBSQ HOSP IP/OBS MODERATE 35: CPT | Performed by: PHYSICIAN ASSISTANT

## 2017-12-15 PROCEDURE — 6370000000 HC RX 637 (ALT 250 FOR IP): Performed by: INTERNAL MEDICINE

## 2017-12-15 RX ORDER — LISINOPRIL 2.5 MG/1
2.5 TABLET ORAL DAILY
Qty: 30 TABLET | Refills: 3 | Status: SHIPPED | OUTPATIENT
Start: 2017-12-16 | End: 2017-12-19

## 2017-12-15 RX ORDER — MULTIVITAMIN WITH FOLIC ACID 400 MCG
1 TABLET ORAL DAILY
Qty: 30 TABLET | Refills: 1 | Status: SHIPPED | OUTPATIENT
Start: 2017-12-16

## 2017-12-15 RX ORDER — METOPROLOL SUCCINATE 100 MG/1
100 TABLET, EXTENDED RELEASE ORAL 2 TIMES DAILY
Qty: 30 TABLET | Refills: 3 | Status: SHIPPED | OUTPATIENT
Start: 2017-12-15 | End: 2017-12-19 | Stop reason: SDUPTHER

## 2017-12-15 RX ORDER — SPIRONOLACTONE 25 MG/1
25 TABLET ORAL DAILY
Qty: 30 TABLET | Refills: 3 | Status: SHIPPED | OUTPATIENT
Start: 2017-12-16 | End: 2017-12-19 | Stop reason: SDUPTHER

## 2017-12-15 RX ORDER — FOLIC ACID 1 MG/1
1 TABLET ORAL DAILY
Qty: 30 TABLET | Refills: 3 | Status: SHIPPED | OUTPATIENT
Start: 2017-12-16 | End: 2018-04-11 | Stop reason: SDUPTHER

## 2017-12-15 RX ORDER — BUMETANIDE 0.5 MG/1
0.5 TABLET ORAL DAILY
Qty: 30 TABLET | Refills: 3 | Status: SHIPPED | OUTPATIENT
Start: 2017-12-16 | End: 2017-12-19 | Stop reason: SDUPTHER

## 2017-12-15 RX ORDER — AMIODARONE HYDROCHLORIDE 400 MG/1
400 TABLET ORAL 2 TIMES DAILY
Qty: 30 TABLET | Refills: 3 | Status: SHIPPED | OUTPATIENT
Start: 2017-12-15 | End: 2017-12-19 | Stop reason: ALTCHOICE

## 2017-12-15 RX ORDER — LANOLIN ALCOHOL/MO/W.PET/CERES
100 CREAM (GRAM) TOPICAL DAILY
Qty: 30 TABLET | Refills: 3 | Status: SHIPPED | OUTPATIENT
Start: 2017-12-16

## 2017-12-15 RX ORDER — DIGOXIN 125 MCG
125 TABLET ORAL DAILY
Qty: 30 TABLET | Refills: 3 | Status: SHIPPED | OUTPATIENT
Start: 2017-12-16 | End: 2017-12-19 | Stop reason: SDUPTHER

## 2017-12-15 RX ADMIN — APIXABAN 5 MG: 5 TABLET, FILM COATED ORAL at 08:30

## 2017-12-15 RX ADMIN — THERA TABS 1 TABLET: TAB at 08:30

## 2017-12-15 RX ADMIN — AMIODARONE HYDROCHLORIDE 400 MG: 200 TABLET ORAL at 08:30

## 2017-12-15 RX ADMIN — LISINOPRIL 2.5 MG: 2.5 TABLET ORAL at 08:30

## 2017-12-15 RX ADMIN — BUMETANIDE 0.5 MG: 1 TABLET ORAL at 08:30

## 2017-12-15 RX ADMIN — FOLIC ACID 1 MG: 1 TABLET ORAL at 08:30

## 2017-12-15 RX ADMIN — Medication 10 ML: at 08:31

## 2017-12-15 RX ADMIN — LORAZEPAM 2 MG: 2 TABLET ORAL at 00:00

## 2017-12-15 RX ADMIN — METOPROLOL SUCCINATE 100 MG: 100 TABLET, FILM COATED, EXTENDED RELEASE ORAL at 08:30

## 2017-12-15 RX ADMIN — Medication 100 MG: at 08:30

## 2017-12-15 RX ADMIN — DIGOXIN 125 MCG: 0.12 TABLET ORAL at 08:30

## 2017-12-15 RX ADMIN — SPIRONOLACTONE 25 MG: 25 TABLET ORAL at 08:30

## 2017-12-15 RX ADMIN — LORAZEPAM 1 MG: 1 TABLET ORAL at 08:37

## 2017-12-15 NOTE — CARE COORDINATION
12/15/17, 12:15 PM    Message sent to Dr. Suresh Reynolds via [employer] to notify that [address] has been re-fitted, cardiology has signed off, and hoping to discharge today. Gweneth Hence plans return home independently. Discharge plan discussed by  and . Discharge plan reviewed with patient/ family. Patient/ family verbalize understanding of discharge plan and are in agreement with plan. Understanding was demonstrated using the teach back method.

## 2017-12-15 NOTE — PROGRESS NOTES
on Wednesday and heart cath next Friday  Per AHA/HRS/ACC guidelines, Oh Geiger is being considered for an implantable cardioverter defibrillator (ICD). However, there is a [address] waiting period with a newly diagnosed cardiomyopathy to enable guideline recommended optimal medical therapy. Currently, the Lifevest is being used as a bridge for therapy while medical therapy is being titrated. We plan to re-assess left ventricular function in 1-4 months time to see if this is a persistent or transient condition. If cardiac function improves the patient will be released from the [place] [id number] and if it does not improve we will discuss implantation of an ICD.    Electronically signed by Quinton Cleaning PA-C on 12/15/2017 at 11:27 AM           Electronically signed by Quinton Cleaning PA-C on 12/15/2017 at 11:22 AM

## 2017-12-15 NOTE — DISCHARGE SUMMARY
filed at 12/15/17 1305   Gross per 24 hour   Intake              810 ml   Output                0 ml   Net              810 ml       Diet:  DIET CARDIAC; Exam:  /78   Pulse 84   Temp 97.7 °F (36.5 °C) (Oral)   Resp 16   Ht 5' 9\" (1.753 m)   Wt 228 lb 9.6 oz (103.7 kg)   SpO2 98%   BMI 33.76 kg/m²     General appearance: No apparent distress, appears stated age and cooperative. HEENT: Pupils equal, round, and reactive to light. Conjunctivae/corneas clear. Neck: Supple, with full range of motion. No jugular venous distention. Trachea midline. Respiratory:  Normal respiratory effort. Clear to auscultation, bilaterally without Rales/Wheezes/Rhonchi. No respiratory distress or accessory muscle use. Cardiovascular: Regular rate and rhythm with normal S1/S2 without murmurs, rubs or gallops. Abdomen: Soft, non-tender, non-distended with normal bowel sounds. No rebound or guarding  Musculoskeletal: No clubbing, cyanosis or edema bilaterally. Full range of motion without deformity. No calf tenderness palpation  Skin: Skin color, texture, turgor normal.  No rashes or lesions. Neurologic:  Neurovascularly intact without any focal sensory/motor deficits. Cranial nerves: II-XII intact, grossly non-focal.  Psychiatric: Alert and oriented, thought content appropriate, normal insight  Capillary Refill: Brisk,< 3 seconds   Peripheral Pulses: +2 palpable, equal bilaterally       Labs:   No results for input(s): WBC, HGB, HCT, PLT in the last 72 hours. Recent Labs      12/13/17   0453  12/14/17   0734   NA  141  138   K  3.9  4.0   CL  104  100   CO2  24  26   BUN  13  12   CREATININE  0.7  0.7   CALCIUM  8.4*  8.8     No results for input(s): AST, ALT, BILIDIR, BILITOT, ALKPHOS in the last 72 hours. No results for input(s): INR in the last 72 hours. No results for input(s): Lella Gray in the last 72 hours.     Urinalysis:      Lab Results   Component Value Date    NITRU NEGATIVE 12/11/2017    BLOODU NEGATIVE 12/11/2017    GLUCOSEU NEGATIVE 12/11/2017       Radiology:  XR CHEST STANDARD (2 VW)   Final Result   Small bilateral pleural effusions. No pulmonary infiltrates. **This report has been created using voice recognition software. It may contain minor errors which are inherent in voice recognition technology. **      Final report electronically signed by Dr. Jeanette Shaw on 12/13/2017 7:39 AM      CTA CHEST W [address]   Final Result       1. No pulmonary emboli or pulmonary infiltrates. 2. There is a small pleural effusion on the right with a trace pleural effusion on the left. 3. A trace amount of ascites is noted along the lateral aspect of the liver. **This report has been created using voice recognition software. It may contain minor errors which are inherent in voice recognition technology. **      Final report electronically signed by Dr. Asia Calle on 12/11/2017 12:29 PM      XR Chest Portable   Final Result   NO EVIDENCE OF ACUTE CARDIOPULMONARY PROCESS. **This report has been created using voice recognition software. It may contain minor errors which are inherent in voice recognition technology. **            Final report electronically signed by Dr. Yadira Knapp on 12/11/2017 11:33 AM      VL DUP LOWER EXTREMITY VENOUS RIGHT   Final Result   No evidence of deep venous thrombosis in the right lower extremity. Final report electronically signed by Dr. Keane Bence on 12/11/2017 11:03 AM          Diet: DIET CARDIAC;     Verdugo: none    Microbiology:  Blood cx 12/11 (-) to date    Influenza 12/11 (-)    Tele:   afib - rate to 130-150's at times last 24 hrs and am 12/14 - multiple runs of VT 5-6 beats and 1 episode of 13 beats    DVT prophylaxis: [] Lovenox                                 [] SCDs                                 [] SQ Heparin                                 [] Encourage ambulation           [x] Already on Anticoagulation --

## 2017-12-15 NOTE — FLOWSHEET NOTE
12/14/17 1358   Encounter Summary   Services provided to: Patient   Referral/Consult From: [facility] Family members   Continue Visiting Yes  (12/14/17 Pt is sleeping)   Complexity of Encounter Moderate   Length of Encounter 15 minutes   Spiritual Assessment Completed Yes   Routine   Type Initial   Assessment Sleeping   Intervention Sustaining presence/ Ministry of presence   Outcome Did not respond   Spiritual/Church   Type Spiritual support      visited patient for a follow up visit for spiritual support. At the time of arrival patient was found sleeping and there were no family members available.  offered prayer for healing of the body, mind and spirit. Left a spiritual care card on patient tray table to call spiritual care phone if there were needs for spiritual care. Will follow up for continue supprot.

## 2017-12-17 LAB
BLOOD CULTURE, ROUTINE: NORMAL
BLOOD CULTURE, ROUTINE: NORMAL

## 2017-12-19 ENCOUNTER — OFFICE VISIT (OUTPATIENT)
Dept: CARDIOLOGY CLINIC | Age: 57
End: 2017-12-19
Payer: COMMERCIAL

## 2017-12-19 ENCOUNTER — HOSPITAL ENCOUNTER (OUTPATIENT)
Age: 57
Discharge: HOME | End: 2017-12-19
Payer: COMMERCIAL

## 2017-12-19 VITALS
HEART RATE: 58 BPM | WEIGHT: 205.8 LBS | BODY MASS INDEX: 30.48 KG/M2 | DIASTOLIC BLOOD PRESSURE: 71 MMHG | HEIGHT: 69 IN | SYSTOLIC BLOOD PRESSURE: 131 MMHG

## 2017-12-19 DIAGNOSIS — I42.9 CARDIOMYOPATHY, UNSPECIFIED TYPE (HCC): Primary | ICD-10-CM

## 2017-12-19 DIAGNOSIS — I50.22 CHRONIC SYSTOLIC CONGESTIVE HEART FAILURE (HCC): ICD-10-CM

## 2017-12-19 DIAGNOSIS — I34.0 MODERATE MITRAL REGURGITATION: ICD-10-CM

## 2017-12-19 DIAGNOSIS — I48.19 PERSISTENT ATRIAL FIBRILLATION (HCC): ICD-10-CM

## 2017-12-19 DIAGNOSIS — I42.9 CARDIOMYOPATHY, UNSPECIFIED TYPE (HCC): ICD-10-CM

## 2017-12-19 LAB
ANION GAP SERPL CALCULATED.3IONS-SCNC: 13 MEQ/L (ref 8–16)
BUN BLDV-MCNC: 16 MG/DL (ref 7–22)
CALCIUM SERPL-MCNC: 9.3 MG/DL (ref 8.5–10.5)
CHLORIDE BLD-SCNC: 103 MEQ/L (ref 98–111)
CO2: 27 MEQ/L (ref 23–33)
CREAT SERPL-MCNC: 0.9 MG/DL (ref 0.4–1.2)
GFR SERPL CREATININE-BSD FRML MDRD: 86 ML/MIN/1.73M2
GLUCOSE BLD-MCNC: 91 MG/DL (ref 70–108)
MAGNESIUM: 2.5 MG/DL (ref 1.6–2.4)
POTASSIUM SERPL-SCNC: 4.8 MEQ/L (ref 3.5–5.2)
SODIUM BLD-SCNC: 143 MEQ/L (ref 135–145)

## 2017-12-19 PROCEDURE — 83735 ASSAY OF MAGNESIUM: CPT

## 2017-12-19 PROCEDURE — 36415 COLL VENOUS BLD VENIPUNCTURE: CPT

## 2017-12-19 PROCEDURE — 80048 BASIC METABOLIC PNL TOTAL CA: CPT

## 2017-12-19 PROCEDURE — 99215 OFFICE O/P EST HI 40 MIN: CPT | Performed by: INTERNAL MEDICINE

## 2017-12-19 RX ORDER — BUMETANIDE 0.5 MG/1
0.5 TABLET ORAL DAILY
Qty: 90 TABLET | Refills: 0 | Status: SHIPPED | OUTPATIENT
Start: 2017-12-19 | End: 2018-02-09

## 2017-12-19 RX ORDER — LISINOPRIL 5 MG/1
5 TABLET ORAL DAILY
COMMUNITY
End: 2017-12-27 | Stop reason: SDUPTHER

## 2017-12-19 RX ORDER — METOPROLOL SUCCINATE 100 MG/1
100 TABLET, EXTENDED RELEASE ORAL 2 TIMES DAILY
Qty: 180 TABLET | Refills: 3 | Status: SHIPPED | OUTPATIENT
Start: 2017-12-19 | End: 2018-08-10 | Stop reason: SDUPTHER

## 2017-12-19 RX ORDER — AMIODARONE HYDROCHLORIDE 400 MG/1
400 TABLET ORAL DAILY
Qty: 30 TABLET | Refills: 1
Start: 2017-12-19 | End: 2018-01-08 | Stop reason: SDUPTHER

## 2017-12-19 RX ORDER — LISINOPRIL 2.5 MG/1
2.5 TABLET ORAL DAILY
Qty: 90 TABLET | Refills: 3 | Status: CANCELLED | OUTPATIENT
Start: 2017-12-19

## 2017-12-19 RX ORDER — DIGOXIN 125 MCG
125 TABLET ORAL DAILY
Qty: 90 TABLET | Refills: 3 | Status: SHIPPED | OUTPATIENT
Start: 2017-12-19 | End: 2017-12-19

## 2017-12-19 RX ORDER — AMIODARONE HYDROCHLORIDE 200 MG/1
400 TABLET ORAL 2 TIMES DAILY
COMMUNITY
End: 2017-12-19

## 2017-12-19 RX ORDER — SPIRONOLACTONE 25 MG/1
25 TABLET ORAL DAILY
Qty: 90 TABLET | Refills: 0 | Status: SHIPPED | OUTPATIENT
Start: 2017-12-19 | End: 2017-12-19

## 2017-12-19 RX ORDER — SPIRONOLACTONE 25 MG/1
12.5 TABLET ORAL DAILY
Qty: 30 TABLET | Refills: 3 | Status: ON HOLD
Start: 2017-12-19 | End: 2017-12-20 | Stop reason: CLARIF

## 2017-12-19 NOTE — PROGRESS NOTES
Chief Complaint   Patient presents with    Follow-Up from [facility] Atrial Fibrillation   Patient here for hospital follow-up. REASON FOR CONSULTATION:  Evaluation for atrial fibrillation with rapid  ventricular response and cardiomyopathy with ejection fraction 20% to 25%.   PRESENTED WITH LEG EDEMA AND SOB    Patient denies having any chest pain, SOB, dizziness, palpitations or ENRICO. Eliquis on patient's med list but patient didn't know he was supposed to be taking. Called patient's pharmacy and it is there and for pick-up. Patient verbalized understanding to  today    Post D/C    No cp    Feel well    No sob    No leg edema and all resolved    No N/v/d    Lost wt    Patient Active Problem List   Diagnosis    Atrial fibrillation with RVR (HCC)    Acute systolic congestive heart failure ([address]    Cardiomyopathy ([address]    Encounter for cardioversion procedure    Leukocytosis    Dyspnea    Polycythemia    Moderate to Severe mitral regurgitation    Obesity (BMI 30-39. 9)    Alcohol abuse    NSVT (nonsustained ventricular tachycardia) (HCC)    Anxiety about health    Irritability    Uncomplicated alcohol withdrawal without perceptual disturbances (HCC)    Chronic systolic congestive heart failure (HCC)    Persistent atrial fibrillation (HCC)       Past Surgical History:   Procedure Laterality Date    CHOLECYSTECTOMY         Allergies   Allergen Reactions    Augmentin [Amoxicillin-Pot Clavulanate] Shortness Of Breath and Swelling        Family History   Problem Relation Age of Onset    Cancer Father         Social History     Social History    Marital status: Single     Spouse name: N/A    Number of children: N/A    Years of education: N/A     Occupational History    Not on file.      Social History Main Topics    Smoking status: Never Smoker    Smokeless tobacco: Current User     Types: Snuff      Comment: occasional use    Alcohol use 1.8 oz/week     3 Cans of beer per week Panel    Magnesium    XR Cardiac Cath Procedure   4. Moderate to Severe mitral regurgitation  Basic Metabolic Panel    Magnesium    XR Cardiac Cath Procedure           Recent admission DX     Episodes of NSVT 13 beat and 160 bpm longest overnight  S/p ATTEMPTED cARDIOVERSION  Leg edema +1 better  1.  Acute systolic congestive heart failure exacerbation with leg edema,  elevated BNP, chest x-ray abnormality, and his BNP is 2274. 2.  Severe cardiomyopathy. PROBABLY TACHY MEDIATED  Ejection fraction 25% to 30%, that is also  newly diagnosed. 3.  Atrial fibrillation with rapid ventricular response newly diagnosed,  not felt much by the patient.  Rate was 201.  Bilateral leg edema of +2.  4.  Elevated BNP more than 2000. 5.  Mild obesity.        Plan     Patient Seen, Chart, Consults notes, Labs, Radiology studies reviewed. Continue the current treatment and with constant vigilance to changes in symptoms and also any potential side effects. Return for care or seek medical attention immediately if symptoms got worse and/or develop new symptoms. Congestive heart failure: no evidence of fluid overload today, no recent hospitalization for CHF  CONT GENTLE DIURESIS oRAL  Cont  PO BUMEX AND ALDACTONE  BMP and MG today  Decrease aldactone to 12.5 po qd     ATR FIB CVR-persistent  RAte controlled  Cont  toprol xl to 100 bid   Cont  apixaban 5 bid  Decrease amiodarone  to 400 po qd  Consider cardioversion down the line after 4 weeks of OA  Stop  digoxin  0.125 mg po qd  Did not take his apixaban after discharge Because pharmacy did not give him when he  the rest of the meds  Resume apixaban after cath tomorrow    Cardiomyopathy  Cardiomyopathy: improving, no CHF symptoms, no change in clinical condition.  Will need periodic echocardiograms depending on symptoms.   cmp-TOPROL XL AND LISINOPRIL  Out pat [facility] ischemia work [facility]  [facility]  Cardioversion after [employer]  Cath

## 2017-12-20 ENCOUNTER — APPOINTMENT (OUTPATIENT)
Dept: CARDIAC CATH/INVASIVE PROCEDURES | Age: 57
End: 2017-12-20
Attending: INTERNAL MEDICINE
Payer: COMMERCIAL

## 2017-12-20 ENCOUNTER — PREP FOR PROCEDURE (OUTPATIENT)
Dept: CARDIOLOGY | Age: 57
End: 2017-12-20

## 2017-12-20 ENCOUNTER — HOSPITAL ENCOUNTER (OUTPATIENT)
Dept: INPATIENT UNIT | Age: 57
Discharge: HOME | End: 2017-12-20
Attending: INTERNAL MEDICINE | Admitting: INTERNAL MEDICINE
Payer: COMMERCIAL

## 2017-12-20 VITALS
HEIGHT: 69 IN | SYSTOLIC BLOOD PRESSURE: 110 MMHG | OXYGEN SATURATION: 99 % | WEIGHT: 205 LBS | DIASTOLIC BLOOD PRESSURE: 84 MMHG | BODY MASS INDEX: 30.36 KG/M2 | TEMPERATURE: 98.9 F | HEART RATE: 66 BPM | RESPIRATION RATE: 15 BRPM

## 2017-12-20 PROBLEM — Z98.890 S/P CARDIAC CATH: Status: ACTIVE | Noted: 2017-12-20

## 2017-12-20 PROBLEM — I42.0 NONISCHEMIC DILATED CARDIOMYOPATHY (HCC): Status: ACTIVE | Noted: 2017-12-20

## 2017-12-20 LAB
ABO: NORMAL
ANION GAP SERPL CALCULATED.3IONS-SCNC: 9 MEQ/L (ref 8–16)
ANTIBODY SCREEN: NORMAL
APTT: 32.8 SECONDS (ref 22–38)
BUN BLDV-MCNC: 18 MG/DL (ref 7–22)
CALCIUM SERPL-MCNC: 9.2 MG/DL (ref 8.5–10.5)
CHLORIDE BLD-SCNC: 103 MEQ/L (ref 98–111)
CO2: 28 MEQ/L (ref 23–33)
CREAT SERPL-MCNC: 0.9 MG/DL (ref 0.4–1.2)
EKG ATRIAL RATE: 85 BPM
EKG Q-T INTERVAL: 366 MS
EKG QRS DURATION: 88 MS
EKG QTC CALCULATION (BAZETT): 437 MS
EKG R AXIS: 102 DEGREES
EKG T AXIS: -28 DEGREES
EKG VENTRICULAR RATE: 86 BPM
GFR SERPL CREATININE-BSD FRML MDRD: 86 ML/MIN/1.73M2
GLUCOSE BLD-MCNC: 102 MG/DL (ref 70–108)
HCT VFR BLD CALC: 57.8 % (ref 42–52)
HEMOGLOBIN: 19.6 GM/DL (ref 14–18)
INR BLD: 0.93 (ref 0.85–1.13)
MCH RBC QN AUTO: 32.7 PG (ref 27–31)
MCHC RBC AUTO-ENTMCNC: 34 GM/DL (ref 33–37)
MCV RBC AUTO: 96.1 FL (ref 80–94)
PDW BLD-RTO: 14.2 % (ref 11.5–14.5)
PLATELET # BLD: 190 THOU/MM3 (ref 130–400)
PMV BLD AUTO: 8.7 MCM (ref 7.4–10.4)
POTASSIUM SERPL-SCNC: 4.8 MEQ/L (ref 3.5–5.2)
RBC # BLD: 6.02 MILL/MM3 (ref 4.7–6.1)
RH FACTOR: NORMAL
SODIUM BLD-SCNC: 140 MEQ/L (ref 135–145)
WBC # BLD: 7.7 THOU/MM3 (ref 4.8–10.8)

## 2017-12-20 PROCEDURE — C1725 CATH, TRANSLUMIN NON-LASER: HCPCS

## 2017-12-20 PROCEDURE — 85730 THROMBOPLASTIN TIME PARTIAL: CPT

## 2017-12-20 PROCEDURE — 2580000003 HC RX 258: Performed by: NURSE PRACTITIONER

## 2017-12-20 PROCEDURE — 86901 BLOOD TYPING SEROLOGIC RH(D): CPT

## 2017-12-20 PROCEDURE — C1769 GUIDE WIRE: HCPCS

## 2017-12-20 PROCEDURE — 93458 L HRT ARTERY/VENTRICLE ANGIO: CPT | Performed by: INTERNAL MEDICINE

## 2017-12-20 PROCEDURE — 2780000010 HC IMPLANT OTHER

## 2017-12-20 PROCEDURE — 36415 COLL VENOUS BLD VENIPUNCTURE: CPT

## 2017-12-20 PROCEDURE — 80048 BASIC METABOLIC PNL TOTAL CA: CPT

## 2017-12-20 PROCEDURE — C1894 INTRO/SHEATH, NON-LASER: HCPCS

## 2017-12-20 PROCEDURE — 86850 RBC ANTIBODY SCREEN: CPT

## 2017-12-20 PROCEDURE — 85610 PROTHROMBIN TIME: CPT

## 2017-12-20 PROCEDURE — 93005 ELECTROCARDIOGRAM TRACING: CPT

## 2017-12-20 PROCEDURE — 6370000000 HC RX 637 (ALT 250 FOR IP): Performed by: NURSE PRACTITIONER

## 2017-12-20 PROCEDURE — 85027 COMPLETE CBC AUTOMATED: CPT

## 2017-12-20 PROCEDURE — 86900 BLOOD TYPING SEROLOGIC ABO: CPT

## 2017-12-20 PROCEDURE — 6360000002 HC RX W HCPCS

## 2017-12-20 PROCEDURE — 2500000003 HC RX 250 WO HCPCS

## 2017-12-20 RX ORDER — SPIRONOLACTONE 25 MG/1
25 TABLET ORAL DAILY
Status: ON HOLD | COMMUNITY
End: 2017-12-20

## 2017-12-20 RX ORDER — DIPHENHYDRAMINE HYDROCHLORIDE 50 MG/ML
50 INJECTION INTRAMUSCULAR; INTRAVENOUS ONCE
Status: CANCELLED | OUTPATIENT
Start: 2017-12-20 | End: 2017-12-20

## 2017-12-20 RX ORDER — SODIUM CHLORIDE 0.9 % (FLUSH) 0.9 %
10 SYRINGE (ML) INJECTION EVERY 12 HOURS SCHEDULED
Status: DISCONTINUED | OUTPATIENT
Start: 2017-12-20 | End: 2017-12-20 | Stop reason: SDUPTHER

## 2017-12-20 RX ORDER — SODIUM CHLORIDE 0.9 % (FLUSH) 0.9 %
10 SYRINGE (ML) INJECTION PRN
Status: DISCONTINUED | OUTPATIENT
Start: 2017-12-20 | End: 2017-12-20 | Stop reason: HOSPADM

## 2017-12-20 RX ORDER — SODIUM CHLORIDE 0.9 % (FLUSH) 0.9 %
10 SYRINGE (ML) INJECTION EVERY 12 HOURS SCHEDULED
Status: CANCELLED | OUTPATIENT
Start: 2017-12-20

## 2017-12-20 RX ORDER — ASPIRIN 325 MG
325 TABLET ORAL ONCE
Status: CANCELLED | OUTPATIENT
Start: 2017-12-20 | End: 2017-12-20

## 2017-12-20 RX ORDER — SODIUM CHLORIDE 9 MG/ML
INJECTION, SOLUTION INTRAVENOUS CONTINUOUS
Status: DISCONTINUED | OUTPATIENT
Start: 2017-12-20 | End: 2017-12-20 | Stop reason: HOSPADM

## 2017-12-20 RX ORDER — SPIRONOLACTONE 25 MG/1
12.5 TABLET ORAL DAILY
Qty: 30 TABLET | Refills: 3 | Status: SHIPPED | OUTPATIENT
Start: 2017-12-20 | End: 2018-05-01 | Stop reason: SDUPTHER

## 2017-12-20 RX ORDER — SODIUM CHLORIDE 9 MG/ML
INJECTION, SOLUTION INTRAVENOUS CONTINUOUS
Status: CANCELLED | OUTPATIENT
Start: 2017-12-20

## 2017-12-20 RX ORDER — DIPHENHYDRAMINE HYDROCHLORIDE 50 MG/ML
50 INJECTION INTRAMUSCULAR; INTRAVENOUS ONCE
Status: DISCONTINUED | OUTPATIENT
Start: 2017-12-20 | End: 2017-12-20 | Stop reason: HOSPADM

## 2017-12-20 RX ORDER — ASPIRIN 325 MG
325 TABLET ORAL ONCE
Status: COMPLETED | OUTPATIENT
Start: 2017-12-20 | End: 2017-12-20

## 2017-12-20 RX ORDER — ONDANSETRON 2 MG/ML
4 INJECTION INTRAMUSCULAR; INTRAVENOUS EVERY 6 HOURS PRN
Status: DISCONTINUED | OUTPATIENT
Start: 2017-12-20 | End: 2017-12-20 | Stop reason: HOSPADM

## 2017-12-20 RX ORDER — ACETAMINOPHEN 325 MG/1
650 TABLET ORAL EVERY 4 HOURS PRN
Status: DISCONTINUED | OUTPATIENT
Start: 2017-12-20 | End: 2017-12-20 | Stop reason: HOSPADM

## 2017-12-20 RX ORDER — SODIUM CHLORIDE 0.9 % (FLUSH) 0.9 %
10 SYRINGE (ML) INJECTION PRN
Status: DISCONTINUED | OUTPATIENT
Start: 2017-12-20 | End: 2017-12-20 | Stop reason: SDUPTHER

## 2017-12-20 RX ORDER — SODIUM CHLORIDE 0.9 % (FLUSH) 0.9 %
10 SYRINGE (ML) INJECTION PRN
Status: CANCELLED | OUTPATIENT
Start: 2017-12-20

## 2017-12-20 RX ORDER — SODIUM CHLORIDE 0.9 % (FLUSH) 0.9 %
10 SYRINGE (ML) INJECTION EVERY 12 HOURS SCHEDULED
Status: DISCONTINUED | OUTPATIENT
Start: 2017-12-20 | End: 2017-12-20 | Stop reason: HOSPADM

## 2017-12-20 RX ADMIN — ASPIRIN 325 MG: 325 TABLET, COATED ORAL at 09:39

## 2017-12-20 RX ADMIN — SODIUM CHLORIDE: 9 INJECTION, SOLUTION INTRAVENOUS at 09:13

## 2017-12-20 NOTE — PROGRESS NOTES
Pt admitted to  2E17 ambulatory for heart cath. Pt NPO. Patient accompanied by parents. Vital signs obtained. Assessment and data collection initiated. Oriented to room. Policies and procedures for 2E explained   All questions answered with no further questions at this time. Patient has lifevest on.

## 2017-12-20 NOTE — PLAN OF CARE
Problem: Discharge Planning:  Goal: Discharged to appropriate level of care  Discharged to appropriate level of care   Outcome: Completed Date Met: 12/20/17  Discharge home    Problem: Tissue Perfusion - Peripheral, Altered:  Goal: Absence of hematoma at arterial access site  Absence of hematoma at arterial access site   Outcome: Completed Date Met: 12/20/17  Right radial site stable.   Goal: Circulatory function of lower extremities is within specified parameters  Circulatory function of lower extremities is within specified parameters   Outcome: Completed Date Met: 12/20/17  Circulation checks good

## 2017-12-27 RX ORDER — LISINOPRIL 5 MG/1
5 TABLET ORAL DAILY
Qty: 30 TABLET | Refills: 6 | Status: SHIPPED | OUTPATIENT
Start: 2017-12-27 | End: 2018-01-08 | Stop reason: SDUPTHER

## 2018-01-08 ENCOUNTER — OFFICE VISIT (OUTPATIENT)
Dept: CARDIOLOGY CLINIC | Age: 58
End: 2018-01-08
Payer: COMMERCIAL

## 2018-01-08 VITALS
BODY MASS INDEX: 29.75 KG/M2 | HEART RATE: 108 BPM | HEIGHT: 70 IN | SYSTOLIC BLOOD PRESSURE: 127 MMHG | WEIGHT: 207.8 LBS | DIASTOLIC BLOOD PRESSURE: 91 MMHG

## 2018-01-08 DIAGNOSIS — Z98.890 S/P CARDIAC CATH: Primary | ICD-10-CM

## 2018-01-08 DIAGNOSIS — I47.29 NSVT (NONSUSTAINED VENTRICULAR TACHYCARDIA): ICD-10-CM

## 2018-01-08 DIAGNOSIS — I50.22 CHRONIC SYSTOLIC CONGESTIVE HEART FAILURE (HCC): ICD-10-CM

## 2018-01-08 DIAGNOSIS — I48.19 PERSISTENT ATRIAL FIBRILLATION (HCC): ICD-10-CM

## 2018-01-08 DIAGNOSIS — I42.0 NONISCHEMIC DILATED CARDIOMYOPATHY (HCC): ICD-10-CM

## 2018-01-08 PROBLEM — I48.91 ATRIAL FIBRILLATION WITH RVR (HCC): Status: RESOLVED | Noted: 2017-12-11 | Resolved: 2018-01-08

## 2018-01-08 PROCEDURE — 93000 ELECTROCARDIOGRAM COMPLETE: CPT | Performed by: INTERNAL MEDICINE

## 2018-01-08 PROCEDURE — 99213 OFFICE O/P EST LOW 20 MIN: CPT | Performed by: INTERNAL MEDICINE

## 2018-01-08 RX ORDER — AMIODARONE HYDROCHLORIDE 400 MG/1
400 TABLET ORAL DAILY
Qty: 30 TABLET | Refills: 6 | Status: ON HOLD | OUTPATIENT
Start: 2018-01-08 | End: 2018-01-24 | Stop reason: ALTCHOICE

## 2018-01-08 RX ORDER — DIGOXIN 125 MCG
125 TABLET ORAL DAILY
Qty: 30 TABLET | Refills: 0 | Status: ON HOLD
Start: 2018-01-08 | End: 2018-01-24 | Stop reason: HOSPADM

## 2018-01-08 RX ORDER — LISINOPRIL 5 MG/1
5 TABLET ORAL DAILY
Qty: 30 TABLET | Refills: 6 | Status: SHIPPED | OUTPATIENT
Start: 2018-01-08 | End: 2018-02-09

## 2018-01-08 NOTE — PROGRESS NOTES
no wheezes, rales or rhonchi, symmetric air entry  Heart - normal rate, regular rhythm, normal S1, S2, no murmurs, rubs, clicks or gallops  Abdomen - soft, nontender, nondistended, no masses or organomegaly  Neurological - alert, oriented, normal speech, no focal findings or movement disorder noted  Musculoskeletal - no joint tenderness, deformity or swelling  Extremities - peripheral pulses normal, no pedal edema, no clubbing or cyanosis  Skin - normal coloration and turgor, no rashes, no suspicious skin lesions noted    Lab  No results for input(s): CKTOTAL, CKMB, CKMBINDEX, TROPONINI in the last 72 hours. CBC:   Lab Results   Component Value Date    WBC 7.7 12/20/2017    RBC 6.02 12/20/2017    HGB 19.6 12/20/2017    HCT 57.8 12/20/2017    MCV 96.1 12/20/2017    MCH 32.7 12/20/2017    MCHC 34.0 12/20/2017    RDW 14.2 12/20/2017     12/20/2017    MPV 8.7 12/20/2017     BMP:    Lab Results   Component Value Date     12/20/2017    K 4.8 12/20/2017     12/20/2017    CO2 28 12/20/2017    BUN 18 12/20/2017    LABALBU 4.3 12/11/2017    CREATININE 0.9 12/20/2017    CALCIUM 9.2 12/20/2017    LABGLOM 86 12/20/2017    GLUCOSE 102 12/20/2017     Hepatic Function Panel:    Lab Results   Component Value Date    ALKPHOS 57 12/11/2017    ALT 48 12/11/2017    AST 29 12/11/2017    PROT 6.7 12/11/2017    BILITOT 2.4 12/11/2017    BILIDIR 0.5 12/11/2017    LABALBU 4.3 12/11/2017     Magnesium:    Lab Results   Component Value Date    MG 2.5 12/19/2017     Warfarin PT/INR:  No components found for: PTPATWAR, PTINRWAR  HgBA1c:  No results found for: LABA1C  FLP:  No results found for: TRIG, HDL, LDLCALC, LDLDIRECT, LABVLDL  TSH:    Lab Results   Component Value Date    TSH 4.660 12/11/2017           Assessment    1. S/P cardiac cath 12/20/2017- Angiographically Patent Coronaries, edp 11 mmhg, ef 25%- med RX     2. Persistent atrial fibrillation ([address]     3. Nonischemic dilated cardiomyopathy ([address]     4.  Chronic systolic congestive heart failure ([facility]     5. NSVT (nonsustained ventricular tachycardia) (HCC)             Recent admission DX     Episodes of NSVT 13 beat and 160 bpm longest overnight  S/p ATTEMPTED cARDIOVERSION  Leg edema +1 better  1.  Acute systolic congestive heart failure exacerbation with leg edema,  elevated BNP, chest x-ray abnormality, and his BNP is 2274. 2.  Severe cardiomyopathy. PROBABLY TACHY MEDIATED  Ejection fraction 25% to 30%, that is also  newly diagnosed. 3.  Atrial fibrillation with rapid ventricular response newly diagnosed,  not felt much by the patient.  Rate was 201.  Bilateral leg edema of +2.  4.  Elevated BNP more than 2000. 5.  Mild obesity.        Plan   Cath site look good    Patient Seen, Chart, Consults notes, Labs, Radiology studies reviewed. Continue the current treatment and with constant vigilance to changes in symptoms and also any potential side effects. Return for care or seek medical attention immediately if symptoms got worse and/or develop new symptoms. Congestive heart failure: no evidence of fluid overload today, no recent hospitalization for CHF  CONT GENTLE DIURESIS oRAL  Cont  PO BUMEX AND ALDACTONE  BMP and MG today  Cont  aldactone to 12.5 po qd     ATR FIB CVR-persistent  RAte controlled  Cont  toprol xl to 100 bid   Cont  apixaban 5 bid  Cont  amiodarone  to 400 po qd  Consider cardioversion down the line after 4 weeks of OA  restart  digoxin  0.125 mg po qd  Cont  apixaban 5 po bid started dec 21, 2017  Schedule DC _CV on and after Jan 21, 2018    Cardiomyopathy  Cardiomyopathy: improving, no CHF symptoms, no change in clinical condition.  Will need periodic echocardiograms depending on symptoms.   cmp-TOPROL XL AND LISINOPRIL  Out pat invasive ischemia work up  Cont Lifevest  lisinopril to 5    Patient Seen, Chart, Consults notes, Labs, Radiology studies reviewed.     F/U cardiology in 4 week with bmp and mg      [facility]

## 2018-01-09 ENCOUNTER — TELEPHONE (OUTPATIENT)
Dept: CARDIOLOGY CLINIC | Age: 58
End: 2018-01-09

## 2018-01-24 ENCOUNTER — HOSPITAL ENCOUNTER (OUTPATIENT)
Dept: INPATIENT UNIT | Age: 58
Discharge: HOME | End: 2018-01-24
Attending: INTERNAL MEDICINE | Admitting: INTERNAL MEDICINE
Payer: COMMERCIAL

## 2018-01-24 VITALS
TEMPERATURE: 97.9 F | RESPIRATION RATE: 17 BRPM | HEIGHT: 70 IN | DIASTOLIC BLOOD PRESSURE: 70 MMHG | OXYGEN SATURATION: 97 % | WEIGHT: 207 LBS | HEART RATE: 54 BPM | SYSTOLIC BLOOD PRESSURE: 92 MMHG | BODY MASS INDEX: 29.63 KG/M2

## 2018-01-24 LAB
BUN BLDV-MCNC: 15 MG/DL (ref 7–22)
CALCIUM SERPL-MCNC: 9.3 MG/DL (ref 8.5–10.5)
CHLORIDE BLD-SCNC: 100 MEQ/L (ref 98–111)
CO2: 26 MEQ/L (ref 23–33)
CREAT SERPL-MCNC: 0.9 MG/DL (ref 0.4–1.2)
EKG ATRIAL RATE: 220 BPM
EKG ATRIAL RATE: 59 BPM
EKG P AXIS: 22 DEGREES
EKG P-R INTERVAL: 192 MS
EKG Q-T INTERVAL: 414 MS
EKG Q-T INTERVAL: 438 MS
EKG QRS DURATION: 100 MS
EKG QRS DURATION: 96 MS
EKG QTC CALCULATION (BAZETT): 433 MS
EKG QTC CALCULATION (BAZETT): 492 MS
EKG R AXIS: 84 DEGREES
EKG R AXIS: 85 DEGREES
EKG T AXIS: -119 DEGREES
EKG T AXIS: 123 DEGREES
EKG VENTRICULAR RATE: 59 BPM
EKG VENTRICULAR RATE: 85 BPM
GFR SERPL CREATININE-BSD FRML MDRD: 86 ML/MIN/1.73M2
GLUCOSE BLD-MCNC: 107 MG/DL (ref 70–108)
HCT VFR BLD CALC: 49.4 % (ref 42–52)
HEMOGLOBIN: 17.4 GM/DL (ref 14–18)
INR BLD: 1.26 (ref 0.85–1.13)
MCH RBC QN AUTO: 32.2 PG (ref 27–31)
MCHC RBC AUTO-ENTMCNC: 35.3 GM/DL (ref 33–37)
MCV RBC AUTO: 91.3 FL (ref 80–94)
PDW BLD-RTO: 13.9 % (ref 11.5–14.5)
PLATELET # BLD: 169 THOU/MM3 (ref 130–400)
PMV BLD AUTO: 8.4 MCM (ref 7.4–10.4)
POTASSIUM REFLEX MAGNESIUM: 4 MEQ/L (ref 3.5–5.2)
RBC # BLD: 5.41 MILL/MM3 (ref 4.7–6.1)
SODIUM BLD-SCNC: 140 MEQ/L (ref 135–145)
WBC # BLD: 7.6 THOU/MM3 (ref 4.8–10.8)

## 2018-01-24 PROCEDURE — 92960 CARDIOVERSION ELECTRIC EXT: CPT | Performed by: INTERNAL MEDICINE

## 2018-01-24 PROCEDURE — 6360000002 HC RX W HCPCS

## 2018-01-24 PROCEDURE — 85027 COMPLETE CBC AUTOMATED: CPT

## 2018-01-24 PROCEDURE — 2500000003 HC RX 250 WO HCPCS

## 2018-01-24 PROCEDURE — 85610 PROTHROMBIN TIME: CPT

## 2018-01-24 PROCEDURE — 36415 COLL VENOUS BLD VENIPUNCTURE: CPT

## 2018-01-24 PROCEDURE — 93005 ELECTROCARDIOGRAM TRACING: CPT

## 2018-01-24 PROCEDURE — 2580000003 HC RX 258: Performed by: INTERNAL MEDICINE

## 2018-01-24 PROCEDURE — 80048 BASIC METABOLIC PNL TOTAL CA: CPT

## 2018-01-24 RX ORDER — AMIODARONE HYDROCHLORIDE 200 MG/1
400 TABLET ORAL DAILY
COMMUNITY
End: 2018-02-09

## 2018-01-24 RX ORDER — SODIUM CHLORIDE 0.9 % (FLUSH) 0.9 %
10 SYRINGE (ML) INJECTION PRN
Status: DISCONTINUED | OUTPATIENT
Start: 2018-01-24 | End: 2018-01-24 | Stop reason: HOSPADM

## 2018-01-24 RX ORDER — SODIUM CHLORIDE 0.9 % (FLUSH) 0.9 %
10 SYRINGE (ML) INJECTION EVERY 12 HOURS SCHEDULED
Status: DISCONTINUED | OUTPATIENT
Start: 2018-01-24 | End: 2018-01-24 | Stop reason: HOSPADM

## 2018-01-24 RX ORDER — SODIUM CHLORIDE 0.9 % (FLUSH) 0.9 %
10 SYRINGE (ML) INJECTION PRN
Status: DISCONTINUED | OUTPATIENT
Start: 2018-01-24 | End: 2018-01-24 | Stop reason: SDUPTHER

## 2018-01-24 RX ORDER — SODIUM CHLORIDE 0.9 % (FLUSH) 0.9 %
10 SYRINGE (ML) INJECTION EVERY 12 HOURS SCHEDULED
Status: DISCONTINUED | OUTPATIENT
Start: 2018-01-24 | End: 2018-01-24 | Stop reason: SDUPTHER

## 2018-01-24 RX ORDER — SODIUM CHLORIDE 9 MG/ML
INJECTION, SOLUTION INTRAVENOUS CONTINUOUS
Status: DISCONTINUED | OUTPATIENT
Start: 2018-01-24 | End: 2018-01-24 | Stop reason: HOSPADM

## 2018-01-24 RX ADMIN — SODIUM CHLORIDE: 9 INJECTION, SOLUTION INTRAVENOUS at 08:43

## 2018-01-24 NOTE — FLOWSHEET NOTE
Dr Hauser Deal requesting that Life Vest consult be obtained to evaluated if vest is malfunctioning. [place] Sacks rep aware and well check in pt at home when life vest is present.

## 2018-01-24 NOTE — PROCEDURES
[address] [place] [address]                              CARDIAC CATHETERIZATION    PATIENT NAME: Omar Aaron                     :        1960  MED REC NO:   [id number]                           ROOM:       0012  ACCOUNT NO:   [de-identified]                           ADMIT DATE: 2018  PROVIDER:     Crissy Wilkinson. RADHA Mayers Guru:  2018    SYNCHRONIZED ELECTRICAL CARDIOVERSION    PROCEDURE:  Direct current synchronized electrical cardioversion. INDICATION:  Newly-diagnosed atrial fibrillation diagnosed around early  2017. Right away HAVEN cardioversion was performed and the patient did  not convert and remained in atrial fibrillation and so in view of that, we  put him on amiodarone for now almost a month and we brought him after he  has been on anticoagulation for a month for cardioversion, so the patient  has been on amiodarone and also on oral anticoagulation for the last one  month. So, today we brought him in to perform a cardioversion without any  HAVEN. Basically, the procedures,  1. Deep sedation. 2.  Direct current synchronized electrical cardioversion. PROCEDURE IN DETAIL:  Under continuous EKG monitoring and intermittent  blood pressure monitoring, the patient was sedated with fentanyl and Versed  and was sedated deeply and the patient was in a deep sedation. After that  300 joules of synchronized direct current electric shock has been  delivered. With that the patient converted into normal sinus rhythm and  maintained to be normal sinus rhythm. Reversal has been given with Narcan  and flumazenil of 0.4 mg each and the patient regained his consciousness. He is alert, awake, oriented, and hemodynamically stable, saturating well. COMPLICATION:  None.     CONCLUSION:  Successful direct current synchronized electrical  cardioversion with 300 joules and the patient converted into

## 2018-01-24 NOTE — PRE SEDATION
[address] [id number]  Sedation/Analgesia History & Physical    Pt Name: Yolanda Oliver  MRN: [id number]  YOB: 1960  Provider Performing Procedure: Max Dalal  Primary Care Physician: Fátima Rockwell CNP    PRE-PROCEDURE   DNR-CCA/DNR-CC []Yes [x]No  Brief History/Pre-Procedure Diagnosis:   Recently dxed Atr fib nad has been on OA for over 5 weeks and had hx of previous HAVEN-CV which failed was done without pre RX with amiodraone    Now pre treated with amiodarone for 1 month and today no need for HAVEN and will proceed to DC-Cardioversion   D/w the pat and family the risk and benefit of CV and risk of sedation including aspiration, and low risk of death. pat verbalized understanding and agreed. MEDICAL HISTORY  []CAD/Valve  []Liver Disease  []Lung Disease []Diabetes  []Hypertension []Renal Disease  []Additional information:       has a past medical history of Alcohol abuse; Anxiety about health; Atrial fibrillation ([address] Hypertension; and Obesity (BMI 30-39.9). SURGICAL HISTORY   has a past surgical history that includes Cholecystectomy. Additional information:       ALLERGIES   Allergies as of 01/24/2018 - Review Complete 01/24/2018   Allergen Reaction Noted    Augmentin [amoxicillin-pot clavulanate] Shortness Of Breath and Swelling 12/11/2017     Additional information:       MEDICATIONS   Coumadin Use Last 5 Days [x]No []Yes  Antiplatelet drug therapy use last 5 days  []No [x]Yes  Other anticoagulant use last 5 days  [x]No []Yes    Current Facility-Administered Medications:     0.9 % sodium chloride infusion, , Intravenous, Continuous, Max Dalal MD, Last Rate: 75 mL/hr at 01/24/18 0843    sodium chloride flush 0.9 % injection 10 mL, 10 mL, Intravenous, 2 times per day, Max Dalal MD    sodium chloride flush 0.9 % injection 10 mL, 10 mL, Intravenous, PRN, Max Dalal MD  Prior to Admission medications    Medication Sig Start Date End Date Taking?  Authorizing

## 2018-02-09 ENCOUNTER — OFFICE VISIT (OUTPATIENT)
Dept: CARDIOLOGY CLINIC | Age: 58
End: 2018-02-09
Payer: COMMERCIAL

## 2018-02-09 VITALS
SYSTOLIC BLOOD PRESSURE: 132 MMHG | BODY MASS INDEX: 30.15 KG/M2 | DIASTOLIC BLOOD PRESSURE: 76 MMHG | WEIGHT: 210.6 LBS | HEART RATE: 92 BPM | HEIGHT: 70 IN

## 2018-02-09 DIAGNOSIS — I48.20 CHRONIC ATRIAL FIBRILLATION (HCC): Primary | ICD-10-CM

## 2018-02-09 DIAGNOSIS — Z98.890 S/P CARDIAC CATH: ICD-10-CM

## 2018-02-09 DIAGNOSIS — I42.0 NONISCHEMIC DILATED CARDIOMYOPATHY (HCC): ICD-10-CM

## 2018-02-09 DIAGNOSIS — I50.22 CHRONIC SYSTOLIC CONGESTIVE HEART FAILURE (HCC): ICD-10-CM

## 2018-02-09 PROCEDURE — 99213 OFFICE O/P EST LOW 20 MIN: CPT | Performed by: INTERNAL MEDICINE

## 2018-02-09 PROCEDURE — 93000 ELECTROCARDIOGRAM COMPLETE: CPT | Performed by: INTERNAL MEDICINE

## 2018-02-09 RX ORDER — LISINOPRIL 10 MG/1
10 TABLET ORAL DAILY
Qty: 30 TABLET | Refills: 3 | Status: SHIPPED | OUTPATIENT
Start: 2018-02-09 | End: 2018-02-22

## 2018-02-09 RX ORDER — DIGOXIN 125 MCG
125 TABLET ORAL DAILY
COMMUNITY
End: 2018-05-01 | Stop reason: SDUPTHER

## 2018-02-09 RX ORDER — METOPROLOL SUCCINATE 25 MG/1
25 TABLET, EXTENDED RELEASE ORAL EVERY MORNING
Qty: 30 TABLET | Refills: 5 | Status: SHIPPED | OUTPATIENT
Start: 2018-02-09 | End: 2018-05-01

## 2018-02-09 RX ORDER — BUMETANIDE 0.5 MG/1
0.25 TABLET ORAL DAILY
Qty: 30 TABLET | Refills: 3 | Status: SHIPPED | OUTPATIENT
Start: 2018-02-09 | End: 2018-10-15 | Stop reason: SDUPTHER

## 2018-02-09 NOTE — PROGRESS NOTES
Chief Complaint   Patient presents with    Follow Up After Procedure     cardioversion   Patient here for hospital follow-up. REASON FOR CONSULTATION:  Evaluation for atrial fibrillation with rapid  ventricular response and cardiomyopathy with ejection fraction 20% to 25%.   PRESENTED WITH LEG EDEMA AND SOB    Patient is here to follow up from a cardioversion. EKG done today and pat back to atr fib with CVR    Denied cp, sob, palpitations or edema  Bothered by the life vest going off  No cp    Feel well    No sob    No leg edema and all resolved    No N/v/d    Lost wt    Patient Active Problem List   Diagnosis    Acute systolic congestive heart failure ([address]    Cardiomyopathy ([facility]    Encounter for cardioversion procedure    Leukocytosis    Dyspnea    Polycythemia    Moderate to Severe mitral regurgitation    Obesity (BMI 30-39. 9)    Alcohol abuse    NSVT (nonsustained ventricular tachycardia) (HCC)    Anxiety about health    Irritability    Uncomplicated alcohol withdrawal without perceptual disturbances (HCC)    Chronic systolic congestive heart failure (HCC)    Persistent atrial fibrillation (HCC)    S/P cardiac cath 12/20/2017- Angiographically Patent Coronaries, edp 11 mmhg, ef 25%- med RX    Nonischemic dilated cardiomyopathy (HCC)    Chronic atrial fibrillation (HCC)       Past Surgical History:   Procedure Laterality Date    CHOLECYSTECTOMY         Allergies   Allergen Reactions    Augmentin [Amoxicillin-Pot Clavulanate] Shortness Of Breath and Swelling        Family History   Problem Relation Age of Onset    Cancer Father         Social History     Social History    Marital status: Single     Spouse name: N/A    Number of children: N/A    Years of education: N/A     Occupational History    Not on file.      Social History Main Topics    Smoking status: Never Smoker    Smokeless tobacco: Current User     Types: Snuff      Comment: occasional use    Alcohol use 1.8 oz/week     3 Cans of beer per week      Comment: 3 beers a day    Drug use: No    Sexual activity: Yes     Partners: Female     Other Topics Concern    Not on file     Social History Narrative    No narrative on file       Current Outpatient Prescriptions   Medication Sig Dispense Refill    digoxin (LANOXIN) 125 MCG tablet Take 125 mcg by mouth daily      lisinopril (PRINIVIL;ZESTRIL) 10 MG tablet Take 1 tablet by mouth daily 30 tablet 3    bumetanide (BUMEX) 0.5 MG tablet Take 0.5 tablets by mouth daily 30 tablet 3    metoprolol succinate (TOPROL XL) 25 MG extended release tablet Take 1 tablet by mouth every morning 30 tablet 5    apixaban (ELIQUIS) 5 MG TABS tablet Take 1 tablet by mouth 2 times daily 180 tablet 3    spironolactone (ALDACTONE) 25 MG tablet Take 0.5 tablets by mouth daily 30 tablet 3    metoprolol succinate (TOPROL XL) 100 MG extended release tablet Take 1 tablet by mouth 2 times daily 818 tablet 3    folic acid (FOLVITE) 1 MG tablet Take 1 tablet by mouth daily 30 tablet 3    vitamin B-1 100 MG tablet Take 1 tablet by mouth daily 30 tablet 3    Multiple Vitamin (MULTIVITAMIN) tablet Take 1 tablet by mouth daily 30 tablet 1     No current facility-administered medications for this visit. Review of Systems -     General ROS: negative  Psychological ROS: negative  Hematological and Lymphatic ROS: No history of blood clots or bleeding disorder. Respiratory ROS: no cough, shortness of breath, or wheezing  Cardiovascular ROS: no chest pain or dyspnea on exertion  Gastrointestinal ROS: negative  Genito-Urinary ROS: no dysuria, trouble voiding, or hematuria  Musculoskeletal ROS: negative  Neurological ROS: no TIA or stroke symptoms  Dermatological ROS: negative      Blood pressure 132/76, pulse 92, height 5' 10\" (1.778 m), weight 210 lb 9.6 oz (95.5 kg).         Physical Examination:    General appearance - alert, well appearing, and in no distress  Mental status - alert, oriented to person,

## 2018-02-22 ENCOUNTER — OFFICE VISIT (OUTPATIENT)
Dept: CARDIOLOGY CLINIC | Age: 58
End: 2018-02-22
Payer: COMMERCIAL

## 2018-02-22 VITALS
DIASTOLIC BLOOD PRESSURE: 100 MMHG | WEIGHT: 210.4 LBS | BODY MASS INDEX: 30.19 KG/M2 | HEART RATE: 67 BPM | SYSTOLIC BLOOD PRESSURE: 144 MMHG

## 2018-02-22 DIAGNOSIS — I50.22 CHRONIC SYSTOLIC CONGESTIVE HEART FAILURE (HCC): ICD-10-CM

## 2018-02-22 DIAGNOSIS — I42.0 NONISCHEMIC DILATED CARDIOMYOPATHY (HCC): Primary | ICD-10-CM

## 2018-02-22 DIAGNOSIS — I47.29 NSVT (NONSUSTAINED VENTRICULAR TACHYCARDIA): ICD-10-CM

## 2018-02-22 DIAGNOSIS — I48.0 PAF (PAROXYSMAL ATRIAL FIBRILLATION) (HCC): ICD-10-CM

## 2018-02-22 PROCEDURE — 99213 OFFICE O/P EST LOW 20 MIN: CPT | Performed by: INTERNAL MEDICINE

## 2018-02-22 PROCEDURE — 93000 ELECTROCARDIOGRAM COMPLETE: CPT | Performed by: INTERNAL MEDICINE

## 2018-02-22 RX ORDER — AMIODARONE HYDROCHLORIDE 200 MG/1
200 TABLET ORAL DAILY
Qty: 30 TABLET | Refills: 3 | Status: SHIPPED | OUTPATIENT
Start: 2018-02-22 | End: 2018-07-05 | Stop reason: SDUPTHER

## 2018-02-22 RX ORDER — LISINOPRIL 20 MG/1
20 TABLET ORAL DAILY
Qty: 30 TABLET | Refills: 5 | Status: SHIPPED | OUTPATIENT
Start: 2018-02-22 | End: 2018-03-22 | Stop reason: ALTCHOICE

## 2018-02-22 NOTE — PROGRESS NOTES
Chief Complaint   Patient presents with    2 Week Follow-Up    Atrial Fibrillation    Hypertension   Patient here for hospital follow-up. REASON FOR CONSULTATION:  Evaluation for atrial fibrillation with rapid  ventricular response and cardiomyopathy with ejection fraction 20% to 25%.   PRESENTED WITH LEG EDEMA AND SOB    Patient is here to follow up from a cardioversion. EKG done today and pat back to atr fib with CVR    Pt here for 2 wk check up. Denied cp, sob, palpitations or edema    Bothered by the life vest going off  No cp    Feel well    No sob    No leg edema and all resolved    No N/v/d    Lost wt    Patient Active Problem List   Diagnosis    Acute systolic congestive heart failure ([address]    Cardiomy[facility]    Encounter for cardioversion procedure    Leukocytosis    Dyspnea    Polycythemia    Moderate to Severe mitral regurgitation    Obesity (BMI 30-39. 9)    Alcohol abuse    NSVT (nonsustained ventricular tachycardia) (HCC)    Anxiety about health    Irritability    Uncomplicated alcohol withdrawal without perceptual disturbances (HCC)    Chronic systolic congestive heart failure (HCC)    Persistent atrial fibrillation (HCC)    S/P cardiac cath 12/20/2017- Angiographically Patent Coronaries, edp 11 mmhg, ef 25%- med RX    Nonischemic dilated cardiomyopathy (HCC)    Chronic atrial fibrillation (HCC)    PAF (paroxysmal atrial fibrillation) (HCC)       Past Surgical History:   Procedure Laterality Date    CHOLECYSTECTOMY         Allergies   Allergen Reactions    Augmentin [Amoxicillin-Pot Clavulanate] Shortness Of Breath and Swelling        Family History   Problem Relation Age of Onset    Cancer Father         Social History     Social History    Marital status: Single     Spouse name: N/A    Number of children: N/A    Years of education: N/A     Occupational History    Not on file.      Social History Main Topics    Smoking status: Never Smoker    Smokeless tobacco: Current User     Types: Snuff      Comment: occasional use    Alcohol use 1.8 oz/week     3 Cans of beer per week      Comment: 3 beers a day    Drug use: No    Sexual activity: Yes     Partners: Female     Other Topics Concern    Not on file     Social History Narrative    No narrative on file       Current Outpatient Prescriptions   Medication Sig Dispense Refill    amiodarone (CORDARONE) 200 MG tablet Take 1 tablet by mouth daily 30 tablet 3    lisinopril (PRINIVIL;ZESTRIL) 20 MG tablet Take 1 tablet by mouth daily 30 tablet 5    digoxin (LANOXIN) 125 MCG tablet Take 125 mcg by mouth daily      bumetanide (BUMEX) 0.5 MG tablet Take 0.5 tablets by mouth daily 30 tablet 3    metoprolol succinate (TOPROL XL) 25 MG extended release tablet Take 1 tablet by mouth every morning 30 tablet 5    apixaban (ELIQUIS) 5 MG TABS tablet Take 1 tablet by mouth 2 times daily 180 tablet 3    spironolactone (ALDACTONE) 25 MG tablet Take 0.5 tablets by mouth daily 30 tablet 3    metoprolol succinate (TOPROL XL) 100 MG extended release tablet Take 1 tablet by mouth 2 times daily 567 tablet 3    folic acid (FOLVITE) 1 MG tablet Take 1 tablet by mouth daily 30 tablet 3    vitamin B-1 100 MG tablet Take 1 tablet by mouth daily 30 tablet 3    Multiple Vitamin (MULTIVITAMIN) tablet Take 1 tablet by mouth daily 30 tablet 1     No current facility-administered medications for this visit. Review of Systems -     General ROS: negative  Psychological ROS: negative  Hematological and Lymphatic ROS: No history of blood clots or bleeding disorder.    Respiratory ROS: no cough, shortness of breath, or wheezing  Cardiovascular ROS: no chest pain or dyspnea on exertion  Gastrointestinal ROS: negative  Genito-Urinary ROS: no dysuria, trouble voiding, or hematuria  Musculoskeletal ROS: negative  Neurological ROS: no TIA or stroke symptoms  Dermatological ROS: negative      Blood pressure (!) 144/100, pulse 67, weight 210 lb 6.4 oz (95.4 kg). Physical Examination:    General appearance - alert, well appearing, and in no distress  Mental status - alert, oriented to person, place, and time  Neck - supple, no significant adenopathy, no JVD, or carotid bruits  Chest - clear to auscultation, no wheezes, rales or rhonchi, symmetric air entry  Heart - normal rate, regular rhythm, normal S1, S2, no murmurs, rubs, clicks or gallops  Abdomen - soft, nontender, nondistended, no masses or organomegaly  Neurological - alert, oriented, normal speech, no focal findings or movement disorder noted  Musculoskeletal - no joint tenderness, deformity or swelling  Extremities - peripheral pulses normal, no pedal edema, no clubbing or cyanosis  Skin - normal coloration and turgor, no rashes, no suspicious skin lesions noted    Lab  No results for input(s): CKTOTAL, CKMB, CKMBINDEX, TROPONINI in the last 72 hours.   CBC:   Lab Results   Component Value Date    WBC 7.6 01/24/2018    RBC 5.41 01/24/2018    HGB 17.4 01/24/2018    HCT 49.4 01/24/2018    MCV 91.3 01/24/2018    MCH 32.2 01/24/2018    MCHC 35.3 01/24/2018    RDW 13.9 01/24/2018     01/24/2018    MPV 8.4 01/24/2018     BMP:    Lab Results   Component Value Date     01/24/2018    K 4.0 01/24/2018     01/24/2018    CO2 26 01/24/2018    BUN 15 01/24/2018    LABALBU 4.3 12/11/2017    CREATININE 0.9 01/24/2018    CALCIUM 9.3 01/24/2018    LABGLOM 86 01/24/2018    GLUCOSE 107 01/24/2018     Hepatic Function Panel:    Lab Results   Component Value Date    ALKPHOS 57 12/11/2017    ALT 48 12/11/2017    AST 29 12/11/2017    PROT 6.7 12/11/2017    BILITOT 2.4 12/11/2017    BILIDIR 0.5 12/11/2017    LABALBU 4.3 12/11/2017     Magnesium:    Lab Results   Component Value Date    MG 2.5 12/19/2017     Warfarin PT/INR:  No components found for: PTPATWAR, PTINRWAR  HgBA1c:  No results found for: LABA1C  FLP:  No results found for: TRIG, HDL, LDLCALC, LDLDIRECT, LABVLDL  TSH:    Lab Results

## 2018-03-22 ENCOUNTER — OFFICE VISIT (OUTPATIENT)
Dept: CARDIOLOGY CLINIC | Age: 58
End: 2018-03-22
Payer: COMMERCIAL

## 2018-03-22 VITALS
HEIGHT: 70 IN | DIASTOLIC BLOOD PRESSURE: 82 MMHG | WEIGHT: 216 LBS | BODY MASS INDEX: 30.92 KG/M2 | HEART RATE: 72 BPM | SYSTOLIC BLOOD PRESSURE: 132 MMHG

## 2018-03-22 DIAGNOSIS — I50.22 CHRONIC SYSTOLIC CONGESTIVE HEART FAILURE (HCC): ICD-10-CM

## 2018-03-22 DIAGNOSIS — Z98.890 S/P CARDIAC CATH: ICD-10-CM

## 2018-03-22 DIAGNOSIS — I48.0 PAF (PAROXYSMAL ATRIAL FIBRILLATION) (HCC): ICD-10-CM

## 2018-03-22 DIAGNOSIS — I34.0 MODERATE MITRAL REGURGITATION: ICD-10-CM

## 2018-03-22 DIAGNOSIS — I42.0 NONISCHEMIC DILATED CARDIOMYOPATHY (HCC): Primary | ICD-10-CM

## 2018-03-22 PROCEDURE — 99213 OFFICE O/P EST LOW 20 MIN: CPT | Performed by: INTERNAL MEDICINE

## 2018-03-22 RX ORDER — LISINOPRIL 20 MG/1
20 TABLET ORAL DAILY
Qty: 30 TABLET | Refills: 5 | Status: SHIPPED | OUTPATIENT
Start: 2018-03-22 | End: 2018-09-28 | Stop reason: SDUPTHER

## 2018-03-22 RX ORDER — LISINOPRIL 10 MG/1
10 TABLET ORAL DAILY
COMMUNITY
End: 2018-03-22

## 2018-03-22 NOTE — PROGRESS NOTES
Types: Snuff      Comment: occasional use    Alcohol use 1.8 oz/week     3 Cans of beer per week      Comment: 3 beers a day    Drug use: No    Sexual activity: Yes     Partners: Female     Other Topics Concern    Not on file     Social History Narrative    No narrative on file       Current Outpatient Prescriptions   Medication Sig Dispense Refill    lisinopril (PRINIVIL;ZESTRIL) 20 MG tablet Take 1 tablet by mouth daily 30 tablet 5    amiodarone (CORDARONE) 200 MG tablet Take 1 tablet by mouth daily 30 tablet 3    digoxin (LANOXIN) 125 MCG tablet Take 125 mcg by mouth daily      bumetanide (BUMEX) 0.5 MG tablet Take 0.5 tablets by mouth daily 30 tablet 3    metoprolol succinate (TOPROL XL) 25 MG extended release tablet Take 1 tablet by mouth every morning 30 tablet 5    apixaban (ELIQUIS) 5 MG TABS tablet Take 1 tablet by mouth 2 times daily 180 tablet 3    spironolactone (ALDACTONE) 25 MG tablet Take 0.5 tablets by mouth daily 30 tablet 3    metoprolol succinate (TOPROL XL) 100 MG extended release tablet Take 1 tablet by mouth 2 times daily 969 tablet 3    folic acid (FOLVITE) 1 MG tablet Take 1 tablet by mouth daily 30 tablet 3    vitamin B-1 100 MG tablet Take 1 tablet by mouth daily 30 tablet 3    Multiple Vitamin (MULTIVITAMIN) tablet Take 1 tablet by mouth daily 30 tablet 1     No current facility-administered medications for this visit. Review of Systems -     General ROS: negative  Psychological ROS: negative  Hematological and Lymphatic ROS: No history of blood clots or bleeding disorder.    Respiratory ROS: no cough, shortness of breath, or wheezing  Cardiovascular ROS: no chest pain or dyspnea on exertion  Gastrointestinal ROS: negative  Genito-Urinary ROS: no dysuria, trouble voiding, or hematuria  Musculoskeletal ROS: negative  Neurological ROS: no TIA or stroke symptoms  Dermatological ROS: negative      Blood pressure 132/82, pulse 72, height 5' 10\" (1.778 m), weight 216 lb Value Date    TSH 4.660 12/11/2017         EKG 2/22/18  Sinus  Rhythm   Rate 67  WITHIN NORMAL LIMITS      Assessment  PPERS IN nsr    1. Nonischemic dilated cardiomyopathy (HCC)  Basic Metabolic Panel    Magnesium   2. Chronic systolic congestive heart failure (HCC)  Basic Metabolic Panel    Magnesium   3. PAF (paroxysmal atrial fibrillation) ([facility])  Basic Metabolic Panel    Magnesium   4. Moderate to Severe mitral regurgitation  Basic Metabolic Panel    Magnesium   5. S/P cardiac cath 12/20/2017- Angiographically Patent Coronaries, edp 11 mmhg, ef 25%- med RX  Basic Metabolic Panel    Magnesium           Recent admission DX     Episodes of NSVT 13 beat and 160 bpm longest overnight  S/p ATTEMPTED cARDIOVERSION  Leg edema +1 better  1.  Acute systolic congestive heart failure exacerbation with leg edema,  elevated BNP, chest x-ray abnormality, and his BNP is 2274. 2.  Severe cardiomyopathy. PROBABLY TACHY MEDIATED  Ejection fraction 25% to 30%, that is also  newly diagnosed. 3.  Atrial fibrillation with rapid ventricular response newly diagnosed,  not felt much by the patient.  Rate was 201.  Bilateral leg edema of +2.  4.  Elevated BNP more than 2000. 5.  Mild obesity.        Plan   The current meds and labs reviewed    Patient Seen, Chart, Consults notes, Labs, Radiology studies reviewed. Continue the current treatment and with constant vigilance to changes in symptoms and also any potential side effects. Return for care or seek medical attention immediately if symptoms got worse and/or develop new symptoms.     Congestive heart failure: no evidence of fluid overload today, no recent hospitalization for CHF  CONT GENTLE DIURESIS oRAL  Cont  PO BUMEX  0.25 AND ALDACTONE 12.5 po qd  BMP and MG today  Cont  aldactone to 12.5 po qd     ATR FIB CVR-persistent  HR on activity central 106 bpm  S/p Cardioversion and now back to atr fib  RAte controlled  Cont  toprol xl to 100 bid    cont  toprol XL 25 po q am  Cont apixaban 5 bid  cont  amiodarone   200  to keep in NSR  Consider cardioversion down the line after 4 weeks of OA  cont digoxin  0.125 mg po qd  Cont  apixaban 5 po bid started dec 21, 2017      Cardiomyopathy  Cardiomyopathy: improving, no CHF symptoms, no change in clinical condition.  Will need periodic echocardiograms depending on symptoms.   cmp-TOPROL XL AND LISINOPRIL  Off  Lifevest and returned to company-He refused to have  He verbalized understanding the risk  Increase lisinopril to 20-  Call with BP after 2 weeks  Echo full in 3 weeks    Patient Seen, Chart, Consults notes, Labs, Radiology studies reviewed.     F/U cardiology in 4  week with bmp and mg for med adjustment       [facility]

## 2018-04-05 ENCOUNTER — HOSPITAL ENCOUNTER (OUTPATIENT)
Dept: NON INVASIVE DIAGNOSTICS | Age: 58
Discharge: HOME | End: 2018-04-05
Payer: COMMERCIAL

## 2018-04-05 DIAGNOSIS — Z98.890 S/P CARDIAC CATH: ICD-10-CM

## 2018-04-05 DIAGNOSIS — I50.22 CHRONIC SYSTOLIC CONGESTIVE HEART FAILURE (HCC): ICD-10-CM

## 2018-04-05 DIAGNOSIS — I48.0 PAF (PAROXYSMAL ATRIAL FIBRILLATION) (HCC): ICD-10-CM

## 2018-04-05 DIAGNOSIS — I42.0 NONISCHEMIC DILATED CARDIOMYOPATHY (HCC): ICD-10-CM

## 2018-04-05 DIAGNOSIS — I34.0 MODERATE MITRAL REGURGITATION: ICD-10-CM

## 2018-04-05 LAB
LV EF: 45 %
LVEF MODALITY: NORMAL

## 2018-04-05 PROCEDURE — 93306 TTE W/DOPPLER COMPLETE: CPT

## 2018-04-06 ENCOUNTER — TELEPHONE (OUTPATIENT)
Dept: CARDIOLOGY CLINIC | Age: 58
End: 2018-04-06

## 2018-04-11 RX ORDER — FOLIC ACID 1 MG/1
1 TABLET ORAL DAILY
Qty: 30 TABLET | Refills: 1 | Status: SHIPPED | OUTPATIENT
Start: 2018-04-11 | End: 2018-05-01 | Stop reason: SDUPTHER

## 2018-05-01 ENCOUNTER — OFFICE VISIT (OUTPATIENT)
Dept: CARDIOLOGY CLINIC | Age: 58
End: 2018-05-01
Payer: COMMERCIAL

## 2018-05-01 VITALS
HEART RATE: 64 BPM | SYSTOLIC BLOOD PRESSURE: 124 MMHG | DIASTOLIC BLOOD PRESSURE: 88 MMHG | WEIGHT: 212.4 LBS | BODY MASS INDEX: 30.41 KG/M2 | HEIGHT: 70 IN

## 2018-05-01 DIAGNOSIS — I50.22 CHRONIC SYSTOLIC CONGESTIVE HEART FAILURE (HCC): ICD-10-CM

## 2018-05-01 DIAGNOSIS — I34.0 MODERATE MITRAL REGURGITATION: ICD-10-CM

## 2018-05-01 DIAGNOSIS — Z98.890 S/P CARDIAC CATH: ICD-10-CM

## 2018-05-01 DIAGNOSIS — I42.0 NONISCHEMIC DILATED CARDIOMYOPATHY (HCC): Primary | ICD-10-CM

## 2018-05-01 DIAGNOSIS — I48.0 PAF (PAROXYSMAL ATRIAL FIBRILLATION) (HCC): ICD-10-CM

## 2018-05-01 PROBLEM — I48.20 CHRONIC ATRIAL FIBRILLATION (HCC): Status: RESOLVED | Noted: 2018-02-09 | Resolved: 2018-05-01

## 2018-05-01 PROCEDURE — 99214 OFFICE O/P EST MOD 30 MIN: CPT | Performed by: INTERNAL MEDICINE

## 2018-05-01 RX ORDER — FOLIC ACID 1 MG/1
1 TABLET ORAL DAILY
Qty: 90 TABLET | Refills: 3 | Status: SHIPPED | OUTPATIENT
Start: 2018-05-01 | End: 2019-01-03 | Stop reason: SDUPTHER

## 2018-05-01 RX ORDER — SPIRONOLACTONE 25 MG/1
12.5 TABLET ORAL DAILY
Qty: 45 TABLET | Refills: 3 | Status: SHIPPED | OUTPATIENT
Start: 2018-05-01 | End: 2019-01-03 | Stop reason: SDUPTHER

## 2018-05-01 RX ORDER — DIGOXIN 125 MCG
125 TABLET ORAL DAILY
Qty: 90 TABLET | Refills: 3 | Status: SHIPPED | OUTPATIENT
Start: 2018-05-01 | End: 2018-05-01

## 2018-07-05 RX ORDER — AMIODARONE HYDROCHLORIDE 200 MG/1
200 TABLET ORAL DAILY
Qty: 30 TABLET | Refills: 0 | Status: SHIPPED | OUTPATIENT
Start: 2018-07-05 | End: 2018-08-10 | Stop reason: SDUPTHER

## 2018-08-02 ENCOUNTER — OFFICE VISIT (OUTPATIENT)
Dept: CARDIOLOGY CLINIC | Age: 58
End: 2018-08-02
Payer: COMMERCIAL

## 2018-08-02 ENCOUNTER — HOSPITAL ENCOUNTER (OUTPATIENT)
Age: 58
Discharge: HOME | End: 2018-08-02
Payer: COMMERCIAL

## 2018-08-02 VITALS
DIASTOLIC BLOOD PRESSURE: 96 MMHG | SYSTOLIC BLOOD PRESSURE: 152 MMHG | WEIGHT: 212 LBS | HEART RATE: 97 BPM | BODY MASS INDEX: 30.35 KG/M2 | HEIGHT: 70 IN

## 2018-08-02 DIAGNOSIS — I42.0 NONISCHEMIC DILATED CARDIOMYOPATHY (HCC): ICD-10-CM

## 2018-08-02 DIAGNOSIS — I42.0 NONISCHEMIC DILATED CARDIOMYOPATHY (HCC): Primary | ICD-10-CM

## 2018-08-02 DIAGNOSIS — I48.0 PAF (PAROXYSMAL ATRIAL FIBRILLATION) (HCC): ICD-10-CM

## 2018-08-02 DIAGNOSIS — I34.0 MODERATE MITRAL REGURGITATION: ICD-10-CM

## 2018-08-02 DIAGNOSIS — Z98.890 S/P CARDIAC CATH: ICD-10-CM

## 2018-08-02 LAB
ANION GAP SERPL CALCULATED.3IONS-SCNC: 15 MEQ/L (ref 8–16)
BUN BLDV-MCNC: 16 MG/DL (ref 7–22)
CALCIUM SERPL-MCNC: 9.9 MG/DL (ref 8.5–10.5)
CHLORIDE BLD-SCNC: 100 MEQ/L (ref 98–111)
CO2: 24 MEQ/L (ref 23–33)
CREAT SERPL-MCNC: 1.1 MG/DL (ref 0.4–1.2)
GFR SERPL CREATININE-BSD FRML MDRD: 68 ML/MIN/1.73M2
GLUCOSE BLD-MCNC: 96 MG/DL (ref 70–108)
MAGNESIUM: 2 MG/DL (ref 1.6–2.4)
POTASSIUM SERPL-SCNC: 4.1 MEQ/L (ref 3.5–5.2)
SODIUM BLD-SCNC: 139 MEQ/L (ref 135–145)

## 2018-08-02 PROCEDURE — 36415 COLL VENOUS BLD VENIPUNCTURE: CPT

## 2018-08-02 PROCEDURE — 93000 ELECTROCARDIOGRAM COMPLETE: CPT | Performed by: INTERNAL MEDICINE

## 2018-08-02 PROCEDURE — 80048 BASIC METABOLIC PNL TOTAL CA: CPT

## 2018-08-02 PROCEDURE — 99214 OFFICE O/P EST MOD 30 MIN: CPT | Performed by: INTERNAL MEDICINE

## 2018-08-02 PROCEDURE — 83735 ASSAY OF MAGNESIUM: CPT

## 2018-08-02 RX ORDER — AMIODARONE HYDROCHLORIDE 200 MG/1
200 TABLET ORAL DAILY
Qty: 90 TABLET | Refills: 3 | Status: CANCELLED | OUTPATIENT
Start: 2018-08-02

## 2018-08-02 RX ORDER — METOPROLOL SUCCINATE 100 MG/1
100 TABLET, EXTENDED RELEASE ORAL 2 TIMES DAILY
Qty: 180 TABLET | Refills: 3 | Status: CANCELLED | OUTPATIENT
Start: 2018-08-02

## 2018-08-02 NOTE — PROGRESS NOTES
Comment: occasional use    Alcohol use 1.8 oz/week     3 Cans of beer per week      Comment: 3 beers a day    Drug use: No    Sexual activity: Yes     Partners: Female     Other Topics Concern    Not on file     Social History Narrative    No narrative on file       Current Outpatient Prescriptions   Medication Sig Dispense Refill    amiodarone (CORDARONE) 200 MG tablet Take 1 tablet by mouth daily 30 tablet 0    spironolactone (ALDACTONE) 25 MG tablet Take 0.5 tablets by mouth daily 45 tablet 3    folic acid (FOLVITE) 1 MG tablet Take 1 tablet by mouth daily 90 tablet 3    lisinopril (PRINIVIL;ZESTRIL) 20 MG tablet Take 1 tablet by mouth daily 30 tablet 5    bumetanide (BUMEX) 0.5 MG tablet Take 0.5 tablets by mouth daily 30 tablet 3    apixaban (ELIQUIS) 5 MG TABS tablet Take 1 tablet by mouth 2 times daily 180 tablet 3    metoprolol succinate (TOPROL XL) 100 MG extended release tablet Take 1 tablet by mouth 2 times daily 180 tablet 3    vitamin B-1 100 MG tablet Take 1 tablet by mouth daily 30 tablet 3    Multiple Vitamin (MULTIVITAMIN) tablet Take 1 tablet by mouth daily 30 tablet 1     No current facility-administered medications for this visit. Review of Systems -     General ROS: negative  Psychological ROS: negative  Hematological and Lymphatic ROS: No history of blood clots or bleeding disorder. Respiratory ROS: no cough, shortness of breath, or wheezing  Cardiovascular ROS: no chest pain or dyspnea on exertion  Gastrointestinal ROS: negative  Genito-Urinary ROS: no dysuria, trouble voiding, or hematuria  Musculoskeletal ROS: negative  Neurological ROS: no TIA or stroke symptoms  Dermatological ROS: negative      Blood pressure (!) 152/96, pulse 97, height 5' 10\" (1.778 m), weight 212 lb (96.2 kg).         Physical Examination:    General appearance - alert, well appearing, and in no distress  Mental status - alert, oriented to person, place, and time  Neck - supple, no significant adenopathy, no JVD, or carotid bruits  Chest - clear to auscultation, no wheezes, rales or rhonchi, symmetric air entry  Heart - normal rate, regular rhythm, normal S1, S2, no murmurs, rubs, clicks or gallops  Abdomen - soft, nontender, nondistended, no masses or organomegaly  Neurological - alert, oriented, normal speech, no focal findings or movement disorder noted  Musculoskeletal - no joint tenderness, deformity or swelling  Extremities - peripheral pulses normal, no pedal edema, no clubbing or cyanosis  Skin - normal coloration and turgor, no rashes, no suspicious skin lesions noted    Lab  No results for input(s): CKTOTAL, CKMB, CKMBINDEX, TROPONINI in the last 72 hours.   CBC:   Lab Results   Component Value Date    WBC 7.6 01/24/2018    RBC 5.41 01/24/2018    HGB 17.4 01/24/2018    HCT 49.4 01/24/2018    MCV 91.3 01/24/2018    MCH 32.2 01/24/2018    MCHC 35.3 01/24/2018    RDW 13.9 01/24/2018     01/24/2018    MPV 8.4 01/24/2018     BMP:    Lab Results   Component Value Date     08/02/2018    K 4.1 08/02/2018    K 4.0 01/24/2018     08/02/2018    CO2 24 08/02/2018    BUN 16 08/02/2018    LABALBU 4.3 12/11/2017    CREATININE 1.1 08/02/2018    CALCIUM 9.9 08/02/2018    LABGLOM 68 08/02/2018    GLUCOSE 96 08/02/2018     Hepatic Function Panel:    Lab Results   Component Value Date    ALKPHOS 57 12/11/2017    ALT 48 12/11/2017    AST 29 12/11/2017    PROT 6.7 12/11/2017    BILITOT 2.4 12/11/2017    BILIDIR 0.5 12/11/2017    LABALBU 4.3 12/11/2017     Magnesium:    Lab Results   Component Value Date    MG 2.0 08/02/2018     Warfarin PT/INR:  No components found for: PTPATWAR, PTINRWAR  HgBA1c:  No results found for: LABA1C  FLP:  No results found for: TRIG, HDL, LDLCALC, LDLDIRECT, LABVLDL  TSH:    Lab Results   Component Value Date    TSH 4.660 12/11/2017         EKG 2/22/18  Sinus  Rhythm   Rate 67  WITHIN NORMAL LIMITS    EKG 8/2/18  NSR, NO acute abn      Assessment    Home SBP

## 2018-08-10 RX ORDER — METOPROLOL SUCCINATE 100 MG/1
100 TABLET, EXTENDED RELEASE ORAL 2 TIMES DAILY
Qty: 180 TABLET | Refills: 3 | Status: SHIPPED | OUTPATIENT
Start: 2018-08-10 | End: 2019-01-03 | Stop reason: SDUPTHER

## 2018-08-10 RX ORDER — AMIODARONE HYDROCHLORIDE 200 MG/1
200 TABLET ORAL DAILY
Qty: 30 TABLET | Refills: 5 | Status: SHIPPED | OUTPATIENT
Start: 2018-08-10 | End: 2019-01-03 | Stop reason: SDUPTHER

## 2018-09-28 RX ORDER — LISINOPRIL 20 MG/1
TABLET ORAL
Qty: 30 TABLET | Refills: 3 | Status: SHIPPED | OUTPATIENT
Start: 2018-09-28 | End: 2019-01-03 | Stop reason: SDUPTHER

## 2018-10-16 RX ORDER — BUMETANIDE 0.5 MG/1
0.25 TABLET ORAL DAILY
Qty: 15 TABLET | Refills: 3 | Status: SHIPPED | OUTPATIENT
Start: 2018-10-16 | End: 2019-01-03 | Stop reason: SDUPTHER

## 2019-01-03 ENCOUNTER — OFFICE VISIT (OUTPATIENT)
Dept: CARDIOLOGY CLINIC | Age: 59
End: 2019-01-03
Payer: COMMERCIAL

## 2019-01-03 VITALS
WEIGHT: 210.6 LBS | HEIGHT: 70 IN | HEART RATE: 81 BPM | BODY MASS INDEX: 30.15 KG/M2 | SYSTOLIC BLOOD PRESSURE: 114 MMHG | DIASTOLIC BLOOD PRESSURE: 70 MMHG

## 2019-01-03 DIAGNOSIS — I50.22 CHRONIC SYSTOLIC CONGESTIVE HEART FAILURE (HCC): ICD-10-CM

## 2019-01-03 DIAGNOSIS — Z98.890 S/P CARDIAC CATH: ICD-10-CM

## 2019-01-03 DIAGNOSIS — I48.0 PAF (PAROXYSMAL ATRIAL FIBRILLATION) (HCC): ICD-10-CM

## 2019-01-03 DIAGNOSIS — I42.0 NONISCHEMIC DILATED CARDIOMYOPATHY (HCC): Primary | ICD-10-CM

## 2019-01-03 PROCEDURE — 93000 ELECTROCARDIOGRAM COMPLETE: CPT | Performed by: INTERNAL MEDICINE

## 2019-01-03 PROCEDURE — 99214 OFFICE O/P EST MOD 30 MIN: CPT | Performed by: INTERNAL MEDICINE

## 2019-01-06 RX ORDER — METOPROLOL SUCCINATE 100 MG/1
100 TABLET, EXTENDED RELEASE ORAL 2 TIMES DAILY
Qty: 180 TABLET | Refills: 3 | Status: SHIPPED | OUTPATIENT
Start: 2019-01-06 | End: 2019-07-02 | Stop reason: SDUPTHER

## 2019-01-06 RX ORDER — BUMETANIDE 0.5 MG/1
0.25 TABLET ORAL DAILY
Qty: 45 TABLET | Refills: 3 | Status: SHIPPED | OUTPATIENT
Start: 2019-01-06 | End: 2020-01-07 | Stop reason: SDUPTHER

## 2019-01-06 RX ORDER — AMIODARONE HYDROCHLORIDE 200 MG/1
200 TABLET ORAL DAILY
Qty: 90 TABLET | Refills: 3 | Status: SHIPPED | OUTPATIENT
Start: 2019-01-06 | End: 2019-07-02

## 2019-01-06 RX ORDER — SPIRONOLACTONE 25 MG/1
12.5 TABLET ORAL DAILY
Qty: 45 TABLET | Refills: 3 | Status: SHIPPED | OUTPATIENT
Start: 2019-01-06 | End: 2020-01-07 | Stop reason: SDUPTHER

## 2019-01-06 RX ORDER — LISINOPRIL 20 MG/1
TABLET ORAL
Qty: 90 TABLET | Refills: 3 | Status: SHIPPED | OUTPATIENT
Start: 2019-01-06 | End: 2019-07-02

## 2019-01-06 RX ORDER — FOLIC ACID 1 MG/1
1 TABLET ORAL DAILY
Qty: 90 TABLET | Refills: 3 | Status: SHIPPED | OUTPATIENT
Start: 2019-01-06 | End: 2020-01-07 | Stop reason: SDUPTHER

## 2019-01-07 ENCOUNTER — HOSPITAL ENCOUNTER (OUTPATIENT)
Dept: PULMONOLOGY | Age: 59
Discharge: HOME | End: 2019-01-07
Payer: COMMERCIAL

## 2019-01-07 DIAGNOSIS — I50.22 CHRONIC SYSTOLIC CONGESTIVE HEART FAILURE (HCC): ICD-10-CM

## 2019-01-07 DIAGNOSIS — Z98.890 S/P CARDIAC CATH: ICD-10-CM

## 2019-01-07 DIAGNOSIS — I42.0 NONISCHEMIC DILATED CARDIOMYOPATHY (HCC): ICD-10-CM

## 2019-01-07 DIAGNOSIS — I48.0 PAF (PAROXYSMAL ATRIAL FIBRILLATION) (HCC): ICD-10-CM

## 2019-01-07 PROCEDURE — 2709999900 HC NON-CHARGEABLE SUPPLY

## 2019-01-07 PROCEDURE — 94060 EVALUATION OF WHEEZING: CPT

## 2019-01-07 PROCEDURE — 94729 DIFFUSING CAPACITY: CPT

## 2019-01-07 PROCEDURE — 94726 PLETHYSMOGRAPHY LUNG VOLUMES: CPT

## 2019-02-28 ENCOUNTER — OFFICE VISIT (OUTPATIENT)
Dept: FAMILY MEDICINE CLINIC | Age: 59
End: 2019-02-28
Payer: COMMERCIAL

## 2019-02-28 VITALS
HEART RATE: 65 BPM | SYSTOLIC BLOOD PRESSURE: 130 MMHG | OXYGEN SATURATION: 98 % | WEIGHT: 213.6 LBS | BODY MASS INDEX: 30.65 KG/M2 | DIASTOLIC BLOOD PRESSURE: 90 MMHG | RESPIRATION RATE: 20 BRPM

## 2019-02-28 DIAGNOSIS — R05.9 COUGH: ICD-10-CM

## 2019-02-28 DIAGNOSIS — J40 BRONCHITIS: Primary | ICD-10-CM

## 2019-02-28 PROCEDURE — 96372 THER/PROPH/DIAG INJ SC/IM: CPT | Performed by: NURSE PRACTITIONER

## 2019-02-28 PROCEDURE — 99213 OFFICE O/P EST LOW 20 MIN: CPT | Performed by: NURSE PRACTITIONER

## 2019-02-28 RX ORDER — GUAIFENESIN AND CODEINE PHOSPHATE 100; 10 MG/5ML; MG/5ML
5 SOLUTION ORAL 3 TIMES DAILY PRN
Qty: 100 ML | Refills: 0 | Status: SHIPPED | OUTPATIENT
Start: 2019-02-28 | End: 2019-03-07

## 2019-02-28 RX ORDER — ALBUTEROL SULFATE 90 UG/1
2 AEROSOL, METERED RESPIRATORY (INHALATION) 4 TIMES DAILY PRN
Qty: 1 INHALER | Refills: 0 | Status: SHIPPED | OUTPATIENT
Start: 2019-02-28 | End: 2020-01-07

## 2019-02-28 RX ORDER — TRIAMCINOLONE ACETONIDE 40 MG/ML
60 INJECTION, SUSPENSION INTRA-ARTICULAR; INTRAMUSCULAR ONCE
Status: COMPLETED | OUTPATIENT
Start: 2019-02-28 | End: 2019-02-28

## 2019-02-28 RX ADMIN — TRIAMCINOLONE ACETONIDE 60 MG: 40 INJECTION, SUSPENSION INTRA-ARTICULAR; INTRAMUSCULAR at 15:09

## 2019-02-28 ASSESSMENT — PATIENT HEALTH QUESTIONNAIRE - PHQ9
SUM OF ALL RESPONSES TO PHQ9 QUESTIONS 1 & 2: 0
SUM OF ALL RESPONSES TO PHQ QUESTIONS 1-9: 0
2. FEELING DOWN, DEPRESSED OR HOPELESS: 0
1. LITTLE INTEREST OR PLEASURE IN DOING THINGS: 0
SUM OF ALL RESPONSES TO PHQ QUESTIONS 1-9: 0

## 2019-02-28 ASSESSMENT — ENCOUNTER SYMPTOMS
DIARRHEA: 0
SHORTNESS OF BREATH: 0
COLOR CHANGE: 0
SORE THROAT: 0
VOMITING: 0
COUGH: 1
ABDOMINAL PAIN: 0
SINUS PRESSURE: 0
NAUSEA: 0
WHEEZING: 1
SINUS PAIN: 0

## 2019-06-29 ENCOUNTER — HOSPITAL ENCOUNTER (OUTPATIENT)
Age: 59
Discharge: HOME | End: 2019-06-29
Payer: COMMERCIAL

## 2019-06-29 DIAGNOSIS — I42.0 NONISCHEMIC DILATED CARDIOMYOPATHY (HCC): ICD-10-CM

## 2019-06-29 DIAGNOSIS — I50.22 CHRONIC SYSTOLIC CONGESTIVE HEART FAILURE (HCC): ICD-10-CM

## 2019-06-29 DIAGNOSIS — Z98.890 S/P CARDIAC CATH: ICD-10-CM

## 2019-06-29 DIAGNOSIS — I48.0 PAF (PAROXYSMAL ATRIAL FIBRILLATION) (HCC): ICD-10-CM

## 2019-06-29 LAB
ALBUMIN SERPL-MCNC: 4.7 G/DL (ref 3.5–5.1)
ALP BLD-CCNC: 52 U/L (ref 38–126)
ALT SERPL-CCNC: 28 U/L (ref 11–66)
ANION GAP SERPL CALCULATED.3IONS-SCNC: 13 MEQ/L (ref 8–16)
AST SERPL-CCNC: 23 U/L (ref 5–40)
BILIRUB SERPL-MCNC: 1.1 MG/DL (ref 0.3–1.2)
BILIRUBIN DIRECT: < 0.2 MG/DL (ref 0–0.3)
BUN BLDV-MCNC: 14 MG/DL (ref 7–22)
CALCIUM SERPL-MCNC: 9.7 MG/DL (ref 8.5–10.5)
CHLORIDE BLD-SCNC: 103 MEQ/L (ref 98–111)
CHOLESTEROL, TOTAL: 221 MG/DL (ref 100–199)
CO2: 24 MEQ/L (ref 23–33)
CREAT SERPL-MCNC: 1.2 MG/DL (ref 0.4–1.2)
GFR SERPL CREATININE-BSD FRML MDRD: 62 ML/MIN/1.73M2
GLUCOSE BLD-MCNC: 99 MG/DL (ref 70–108)
HDLC SERPL-MCNC: 67 MG/DL
LDL CHOLESTEROL CALCULATED: 128 MG/DL
MAGNESIUM: 2.2 MG/DL (ref 1.6–2.4)
POTASSIUM SERPL-SCNC: 4.7 MEQ/L (ref 3.5–5.2)
SODIUM BLD-SCNC: 140 MEQ/L (ref 135–145)
T4 FREE: 1.09 NG/DL (ref 0.93–1.76)
TOTAL PROTEIN: 7.4 G/DL (ref 6.1–8)
TRIGL SERPL-MCNC: 129 MG/DL (ref 0–199)
TSH SERPL DL<=0.05 MIU/L-ACNC: 10.77 UIU/ML (ref 0.4–4.2)

## 2019-06-29 PROCEDURE — 82248 BILIRUBIN DIRECT: CPT

## 2019-06-29 PROCEDURE — 80061 LIPID PANEL: CPT

## 2019-06-29 PROCEDURE — 80053 COMPREHEN METABOLIC PANEL: CPT

## 2019-06-29 PROCEDURE — 84443 ASSAY THYROID STIM HORMONE: CPT

## 2019-06-29 PROCEDURE — 84439 ASSAY OF FREE THYROXINE: CPT

## 2019-06-29 PROCEDURE — 83735 ASSAY OF MAGNESIUM: CPT

## 2019-06-29 PROCEDURE — 36415 COLL VENOUS BLD VENIPUNCTURE: CPT

## 2019-07-02 ENCOUNTER — OFFICE VISIT (OUTPATIENT)
Dept: CARDIOLOGY CLINIC | Age: 59
End: 2019-07-02
Payer: COMMERCIAL

## 2019-07-02 VITALS
DIASTOLIC BLOOD PRESSURE: 97 MMHG | HEIGHT: 70 IN | HEART RATE: 85 BPM | BODY MASS INDEX: 28 KG/M2 | WEIGHT: 195.6 LBS | SYSTOLIC BLOOD PRESSURE: 150 MMHG

## 2019-07-02 DIAGNOSIS — I50.22 CHRONIC SYSTOLIC CONGESTIVE HEART FAILURE (HCC): Primary | ICD-10-CM

## 2019-07-02 DIAGNOSIS — I42.0 NONISCHEMIC DILATED CARDIOMYOPATHY (HCC): ICD-10-CM

## 2019-07-02 DIAGNOSIS — I10 ESSENTIAL HYPERTENSION: ICD-10-CM

## 2019-07-02 DIAGNOSIS — Z98.890 S/P CARDIAC CATH: ICD-10-CM

## 2019-07-02 DIAGNOSIS — I48.0 PAF (PAROXYSMAL ATRIAL FIBRILLATION) (HCC): ICD-10-CM

## 2019-07-02 PROBLEM — I48.19 PERSISTENT ATRIAL FIBRILLATION (HCC): Status: RESOLVED | Noted: 2017-12-19 | Resolved: 2019-07-02

## 2019-07-02 PROCEDURE — 93000 ELECTROCARDIOGRAM COMPLETE: CPT | Performed by: INTERNAL MEDICINE

## 2019-07-02 PROCEDURE — 99214 OFFICE O/P EST MOD 30 MIN: CPT | Performed by: INTERNAL MEDICINE

## 2019-07-02 RX ORDER — LISINOPRIL 30 MG/1
30 TABLET ORAL DAILY
Qty: 90 TABLET | Refills: 3 | Status: SHIPPED | OUTPATIENT
Start: 2019-07-02 | End: 2020-07-07 | Stop reason: SDUPTHER

## 2019-07-02 RX ORDER — METOPROLOL SUCCINATE 100 MG/1
100 TABLET, EXTENDED RELEASE ORAL 2 TIMES DAILY
Qty: 180 TABLET | Refills: 3 | Status: SHIPPED | OUTPATIENT
Start: 2019-07-02 | End: 2020-07-02 | Stop reason: SDUPTHER

## 2019-07-02 NOTE — PROGRESS NOTES
Chief Complaint   Patient presents with   [place] Cardiomyopathy   Originally Patient here for hospital follow-up. REASON FOR CONSULTATION:  Evaluation for atrial fibrillation with rapid  ventricular response and cardiomyopathy with ejection fraction 20% to 25%.   PRESENTED WITH LEG EDEMA AND SOB  hX of HAVEN cardioversion. Hx of  atr fib with CVR then back in to NSR        6 month f/u. PFT follow up. Denied dizziness, cp, sob, palpitations or edema    No cp    Feel well    No sob    No leg edema and all resolved    No N/v/d    Lost wt    Patient Active Problem List   Diagnosis    Cardiomyopathy ([facility] [address]    Encounter for cardioversion procedure    Leukocytosis    Polycythemia    Obesity (BMI 30-39. 9)    Alcohol abuse    [facility]    Anxiety about health    Irritability    Uncomplicated alcohol withdrawal without perceptual disturbances ([facility])    Chronic systolic congestive heart failure (HCC)    S/P cardiac cath 12/20/2017- Angiographically Patent Coronaries, edp 11 mmhg, ef 25%- med RX    Nonischemic dilated cardiomyopathy (HCC) EF improved  EF 25 to 45%    PAF [facility]ox[facility]mal [facility] fibrillation[facility] ([facility]    Essential hypertension       Past Surgical History:   Procedure Laterality Date    CHOLECYSTECTOMY         Allergies   Allergen Reactions    Augmentin [Amoxicillin-Pot Clavulanate] Shortness Of Breath and Swelling        Family History   Problem Relation Age of Onset    Cancer Father         Social History     Socioeconomic History    Marital status: Single     Spouse name: Not on file    Number of children: Not on file    Years of education: Not on file    Highest education level: Not on file   Occupational History    Not on file   Social Needs    Financial resource strain: Not on file    Food insecurity:     Worry: Not on file     Inability: Not on file    Transportation needs:     Medical: Not on file     Non-medical: Not on file 30 tablet 1     No current facility-administered medications for this visit. Review of Systems -     General ROS: negative  Psychological ROS: negative  Hematological and Lymphatic ROS: No history of blood clots or bleeding disorder. Respiratory ROS: no cough, shortness of breath, or wheezing  Cardiovascular ROS: no chest pain or dyspnea on exertion  Gastrointestinal ROS: negative  Genito-Urinary ROS: no dysuria, trouble voiding, or hematuria  Musculoskeletal ROS: negative  Neurological ROS: no TIA or stroke symptoms  Dermatological ROS: negative      Blood pressure (!) 150/97, pulse 85, height 5' 10\" (1.778 m), weight 195 lb 9.6 oz (88.7 kg). Physical Examination:    General appearance - alert, well appearing, and in no distress  Mental status - alert, oriented to person, place, and time  Neck - supple, no significant adenopathy, no JVD, or carotid bruits  Chest - clear to auscultation, no wheezes, rales or rhonchi, symmetric air entry  Heart - normal rate, regular rhythm, normal S1, S2, no murmurs, rubs, clicks or gallops  Abdomen - soft, nontender, nondistended, no masses or organomegaly  Neurological - alert, oriented, normal speech, no focal findings or movement disorder noted  Musculoskeletal - no joint tenderness, deformity or swelling  Extremities - peripheral pulses normal, no pedal edema, no clubbing or cyanosis  Skin - normal coloration and turgor, no rashes, no suspicious skin lesions noted    Lab  No results for input(s): CKTOTAL, CKMB, CKMBINDEX, TROPONINI in the last 72 hours.   CBC:   Lab Results   Component Value Date    WBC 7.6 01/24/2018    RBC 5.41 01/24/2018    HGB 17.4 01/24/2018    HCT 49.4 01/24/2018    MCV 91.3 01/24/2018    MCH 32.2 01/24/2018    MCHC 35.3 01/24/2018    RDW 13.9 01/24/2018     01/24/2018    MPV 8.4 01/24/2018     BMP:    Lab Results   Component Value Date     06/29/2019    K 4.7 06/29/2019    K 4.0 01/24/2018     06/29/2019    CO2 24

## 2019-10-11 RX ORDER — APIXABAN 5 MG/1
TABLET, FILM COATED ORAL
Qty: 60 TABLET | Refills: 4 | Status: SHIPPED | OUTPATIENT
Start: 2019-10-11 | End: 2020-04-14

## 2019-12-30 ENCOUNTER — HOSPITAL ENCOUNTER (OUTPATIENT)
Age: 59
End: 2019-12-30
Payer: COMMERCIAL

## 2019-12-30 ENCOUNTER — NURSE ONLY (OUTPATIENT)
Dept: LAB | Age: 59
End: 2019-12-30

## 2019-12-30 DIAGNOSIS — I48.0 PAF (PAROXYSMAL ATRIAL FIBRILLATION) (HCC): ICD-10-CM

## 2019-12-30 DIAGNOSIS — Z98.890 S/P CARDIAC CATH: ICD-10-CM

## 2019-12-30 DIAGNOSIS — I42.0 NONISCHEMIC DILATED CARDIOMYOPATHY (HCC): ICD-10-CM

## 2019-12-30 DIAGNOSIS — I10 ESSENTIAL HYPERTENSION: ICD-10-CM

## 2019-12-30 DIAGNOSIS — I50.22 CHRONIC SYSTOLIC CONGESTIVE HEART FAILURE (HCC): ICD-10-CM

## 2019-12-30 LAB
ANION GAP SERPL CALCULATED.3IONS-SCNC: 13 MEQ/L (ref 8–16)
BUN BLDV-MCNC: 18 MG/DL (ref 7–22)
CALCIUM SERPL-MCNC: 9.6 MG/DL (ref 8.5–10.5)
CHLORIDE BLD-SCNC: 101 MEQ/L (ref 98–111)
CO2: 24 MEQ/L (ref 23–33)
CREAT SERPL-MCNC: 1.1 MG/DL (ref 0.4–1.2)
GFR SERPL CREATININE-BSD FRML MDRD: 68 ML/MIN/1.73M2
GLUCOSE BLD-MCNC: 99 MG/DL (ref 70–108)
POTASSIUM SERPL-SCNC: 4.4 MEQ/L (ref 3.5–5.2)
SODIUM BLD-SCNC: 138 MEQ/L (ref 135–145)
T4 FREE: 0.96 NG/DL (ref 0.93–1.76)
TSH SERPL DL<=0.05 MIU/L-ACNC: 9.37 UIU/ML (ref 0.4–4.2)

## 2020-01-07 ENCOUNTER — OFFICE VISIT (OUTPATIENT)
Dept: CARDIOLOGY CLINIC | Age: 60
End: 2020-01-07
Payer: COMMERCIAL

## 2020-01-07 VITALS
HEIGHT: 70 IN | DIASTOLIC BLOOD PRESSURE: 89 MMHG | WEIGHT: 209.4 LBS | SYSTOLIC BLOOD PRESSURE: 138 MMHG | BODY MASS INDEX: 29.98 KG/M2 | HEART RATE: 65 BPM

## 2020-01-07 PROCEDURE — 99214 OFFICE O/P EST MOD 30 MIN: CPT | Performed by: INTERNAL MEDICINE

## 2020-01-07 RX ORDER — FOLIC ACID 1 MG/1
1 TABLET ORAL DAILY
Qty: 90 TABLET | Refills: 3 | Status: SHIPPED | OUTPATIENT
Start: 2020-01-07 | End: 2020-12-22 | Stop reason: SDUPTHER

## 2020-01-07 RX ORDER — SPIRONOLACTONE 25 MG/1
12.5 TABLET ORAL DAILY
Qty: 45 TABLET | Refills: 3 | Status: SHIPPED | OUTPATIENT
Start: 2020-01-07 | End: 2020-12-22 | Stop reason: SDUPTHER

## 2020-01-07 RX ORDER — BUMETANIDE 0.5 MG/1
0.25 TABLET ORAL DAILY
Qty: 45 TABLET | Refills: 3 | Status: SHIPPED | OUTPATIENT
Start: 2020-01-07 | End: 2021-03-17

## 2020-01-07 NOTE — PROGRESS NOTES
Chief Complaint   Patient presents with    Check-Up    Congestive Heart Failure   Originally Patient here for hospital follow-up. REASON FOR CONSULTATION:  Evaluation for atrial fibrillation with rapid  ventricular response and cardiomyopathy with ejection fraction 20% to 25%.   PRESENTED WITH LEG EDEMA AND SOB  hX of HAVEN cardioversion. Hx of  atr fib with CVR then back in to NSR        6 month F/u    Denied dizziness, cp, sob, palpitations or edema    No cp    Feel well    No sob    No leg edema and all resolved    No N/v/d    No new compliant      Patient Active Problem List   Diagnosis    Encounter for cardioversion procedure    Leukocytosis    Polycythemia    Obesity (BMI 30-39. 9)    Alcohol abuse    NSVT (nonsustained ventricular tachycardia) (HCC)    Anxiety about health    Irritability    [facility]complicated [facility] withdrawal without perceptual disturbances ([facility]    Chronic systolic congestive heart failure (HCC)    S/P cardiac cath 12/20/2017- Angiographically Patent Coronaries, edp 11 mmhg, ef 25%- med RX    Nonischemic dilated cardiomyopathy (HCC) EF improved  EF 25 to 45%    PAF (paroxysmal atrial fibrillation) ([facility])    Essential hypertension       Past Surgical History:   Procedure Laterality Date    CHOLECYSTECTOMY         Allergies   Allergen Reactions    Augmentin [Amoxicillin-Pot Clavulanate] Shortness Of Breath and Swelling        Family History   Problem Relation Age of Onset    Cancer Father         Social History     Socioeconomic History    Marital status: Single     Spouse name: Not on file    Number of children: Not on file    Years of education: Not on file    Highest education level: Not on file   Occupational History    Not on file   Social Needs    Financial resource strain: Not on file    Food insecurity:     Worry: Not on file     Inability: Not on file    Transportation needs:     Medical: Not on file     Non-medical: Not on file   Tobacco Use    Smoking status: Never Smoker    Smokeless tobacco: Current User     Types: Snuff    Tobacco comment: occasional use   Substance and Sexual Activity    Alcohol use: Yes     Alcohol/week: 3.0 standard drinks     Types: 3 Cans of beer per week     Comment: 3 beers a day    Drug use: No    Sexual activity: Yes     Partners: Female   Lifestyle    Physical activity:     Days per week: Not on file     Minutes per session: Not on file    Stress: Not on file   Relationships    Social connections:     Talks on phone: Not on file     Gets together: Not on file     Attends [employer] service: Not on file     Active member of club or organization: Not on file     Attends meetings of clubs or organizations: Not on file     Relationship status: Not on file    Intimate partner violence:     Fear of current or ex partner: Not on file     Emotionally abused: Not on file     Physically abused: Not on file     Forced sexual activity: Not on file   Other Topics Concern    Not on file   Social History Narrative    Not on file       Current Outpatient Medications   Medication Sig Dispense Refill    bumetanide (BUMEX) 0.5 MG tablet Take 0.5 tablets by mouth daily 45 tablet 3    spironolactone (ALDACTONE) 25 MG tablet Take 0.5 tablets by mouth daily 45 tablet 3    folic acid (FOLVITE) 1 MG tablet Take 1 tablet by mouth daily 90 tablet 3    ELIQUIS 5 MG TABS tablet TAKE ONE (1) TABLET BY MOUTH TWO (2) TIMES A DAY. 60 tablet 4    metoprolol succinate (TOPROL XL) 100 MG extended release tablet Take 1 tablet by mouth 2 times daily 180 tablet 3    lisinopril (PRINIVIL;ZESTRIL) 30 MG tablet Take 1 tablet by mouth daily 90 tablet 3    vitamin B-1 100 MG tablet Take 1 tablet by mouth daily 30 tablet 3    Multiple Vitamin (MULTIVITAMIN) tablet Take 1 tablet by mouth daily 30 tablet 1     No current facility-administered medications for this visit.         Review of Systems -     General ROS: negative  Psychological ROS: negative  Hematological and Lab Results   Component Value Date    ALKPHOS 52 06/29/2019    ALT 28 06/29/2019    AST 23 06/29/2019    PROT 7.4 06/29/2019    BILITOT 1.1 06/29/2019    BILIDIR <0.2 06/29/2019    LABALBU 4.7 06/29/2019     Magnesium:    Lab Results   Component Value Date    MG 2.2 06/29/2019     Warfarin PT/INR:  No components found for: PTPATWAR, PTINRWAR  HgBA1c:  No results found for: LABA1C  FLP:    Lab Results   Component Value Date    TRIG 129 06/29/2019    HDL 67 06/29/2019    LDLCALC 128 06/29/2019     TSH:    Lab Results   Component Value Date    TSH 9.370 12/30/2019         EKG 2/22/18  Sinus  Rhythm   Rate 67  WITHIN NORMAL LIMITS    EKG 8/2/18  NSR, NO acute abn    EKG 1/3/19  NSR, no acute abn    EKG 7/2/19  Sinus  Rhythm  HR 85  WITHIN NORMAL LIMITS    Assessment    Home /70 124/80  HR in the 60's    Elevated TESH from amiod  Off amiod for over 6 month now and TSH coming down     Diagnosis Orders   1. Chronic systolic congestive heart failure (HCC)  TSH without Reflex    T4, Free    Basic Metabolic Panel    Magnesium   2. Nonischemic dilated cardiomyopathy (HCC) EF improved  EF 25 to 45%  TSH without Reflex    T4, Free    Basic Metabolic Panel    Magnesium   3. Essential hypertension  TSH without Reflex    T4, Free    Basic Metabolic Panel    Magnesium   4. PAF (paroxysmal atrial fibrillation) ([facility])  TSH without Reflex    T4, Free    Basic Metabolic Panel    Magnesium   5. S/P cardiac cath 12/20/2017- Angiographically Patent Coronaries, edp 11 mmhg, ef 25%- med RX  TSH without Reflex    T4, Free    Basic Metabolic Panel    Magnesium           Recent admission DX     Episodes of NSVT 13 beat and 160 bpm longest overnight  S/p ATTEMPTED cARDIOVERSION  Leg edema +1 better  1.  Acute systolic congestive heart failure exacerbation with leg edema,  elevated BNP, chest x-ray abnormality, and his BNP is 2274. 2.  Severe cardiomyopathy. PROBABLY TACHY MEDIATED  Ejection fraction 25% to 30%, that is also  newly diagnosed. 3.  Atrial fibrillation with rapid ventricular response newly diagnosed,  not felt much by the patient.  Rate was 201.  Bilateral leg edema of +2.  4.  Elevated BNP more than 2000. 5.  Mild obesity.        Plan   The  Most current meds and labs reviewed      Patient Seen, Chart, Consults notes, Labs, Radiology studies reviewed. Continue the current treatment and with constant vigilance to changes in symptoms and also any potential side effects. Return for care or seek medical attention immediately if symptoms got worse and/or develop new symptoms. Congestive heart failure: no evidence of fluid overload today, no recent hospitalization for CHF  CONT GENTLE DIURESIS oRAL  Cont  PO BUMEX  0.25 AND ALDACTONE 12.5 po qd  Cont  aldactone to 12.5 po qd     ATR FIB CVR- s/p CV and still NSR  S/p Cardioversion and now back to atr fib and then had 2nd CV- had been in NSR for a while  Cont  toprol xl to 100 bid   cont  apixaban 5 bid  Off  amiodarone   200  cont digoxin  0.125 mg po qd  Cont  apixaban 5 po bid started dec 21, 2017      Cardiomyopathy- NICM  Cardiomyopathy: improving, no CHF symptoms, no change in clinical condition. Will need periodic echocardiograms depending on symptoms.   cmp-TOPROL XL AND LISINOPRIL  Off  [employer] and returned to company-He refused to have  He verbalized understanding the risk  Cont  lisinopril -  Cont toprol  bid    Hypertension, on medical treatment. Seems to be under good control. Patient is compliant with medical treatment. Increase Lisinopril 30 mg po qd      Patient Seen, Chart, Consults notes, Labs, Radiology studies reviewed. Discussed use, benefit, and side effects of prescribed medications. All patient questions answered. Pt voiced understanding. Instructed to continue current medications, diet and exercise. Continue risk factor modification and medical management. Patient agreed with treatment plan. Follow up as directed.     Labs   tsh with reflex-

## 2020-04-14 RX ORDER — APIXABAN 5 MG/1
TABLET, FILM COATED ORAL
Qty: 60 TABLET | Refills: 2 | Status: SHIPPED | OUTPATIENT
Start: 2020-04-14 | End: 2020-08-12

## 2020-06-29 ENCOUNTER — HOSPITAL ENCOUNTER (OUTPATIENT)
Age: 60
Discharge: HOME | End: 2020-06-29
Payer: COMMERCIAL

## 2020-06-30 LAB
BUN BLDV-MCNC: 13 MG/DL
CALCIUM SERPL-MCNC: 9.5 MG/DL
CHLORIDE BLD-SCNC: 107 MMOL/L
CO2: 26 MMOL/L
CREAT SERPL-MCNC: 1 MG/DL
GFR CALCULATED: 60
GLUCOSE BLD-MCNC: 87 MG/DL
POTASSIUM SERPL-SCNC: 4.2 MMOL/L
SODIUM BLD-SCNC: 140 MMOL/L
T4 FREE: 0.83
TSH SERPL DL<=0.05 MIU/L-ACNC: 4.45 UIU/ML

## 2020-07-02 RX ORDER — METOPROLOL SUCCINATE 100 MG/1
100 TABLET, EXTENDED RELEASE ORAL 2 TIMES DAILY
Qty: 180 TABLET | Refills: 0 | Status: SHIPPED | OUTPATIENT
Start: 2020-07-02 | End: 2020-10-08 | Stop reason: SDUPTHER

## 2020-07-07 ENCOUNTER — OFFICE VISIT (OUTPATIENT)
Dept: CARDIOLOGY CLINIC | Age: 60
End: 2020-07-07
Payer: COMMERCIAL

## 2020-07-07 VITALS
DIASTOLIC BLOOD PRESSURE: 80 MMHG | HEIGHT: 69 IN | SYSTOLIC BLOOD PRESSURE: 132 MMHG | BODY MASS INDEX: 30.51 KG/M2 | HEART RATE: 127 BPM | WEIGHT: 206 LBS

## 2020-07-07 PROBLEM — I48.91 ATRIAL FIBRILLATION WITH RVR (HCC): Status: ACTIVE | Noted: 2020-07-07

## 2020-07-07 PROCEDURE — 93000 ELECTROCARDIOGRAM COMPLETE: CPT | Performed by: INTERNAL MEDICINE

## 2020-07-07 PROCEDURE — 99214 OFFICE O/P EST MOD 30 MIN: CPT | Performed by: INTERNAL MEDICINE

## 2020-07-07 RX ORDER — DIGOXIN 250 MCG
250 TABLET ORAL DAILY
Qty: 90 TABLET | Refills: 3 | Status: SHIPPED | OUTPATIENT
Start: 2020-07-07 | End: 2020-07-23

## 2020-07-07 RX ORDER — DIGOXIN 250 MCG
250 TABLET ORAL DAILY
COMMUNITY
End: 2020-07-07 | Stop reason: SDUPTHER

## 2020-07-07 RX ORDER — LISINOPRIL 10 MG/1
10 TABLET ORAL DAILY
Qty: 90 TABLET | Refills: 2 | Status: SHIPPED | OUTPATIENT
Start: 2020-07-07 | End: 2021-04-01 | Stop reason: SDUPTHER

## 2020-07-07 NOTE — PROGRESS NOTES
Chief Complaint   Patient presents with    Follow-up   Originally Patient here for hospital follow-up. REASON FOR CONSULTATION:  Evaluation for atrial fibrillation with rapid  ventricular response and cardiomyopathy with ejection fraction 20% to 25%.   PRESENTED WITH LEG EDEMA AND SOB  hX of HAVEN cardioversion. Hx of  atr fib with CVR then back in to NSR        6 month F/u    Denied dizziness, cp, sob, palpitations or edema    No cp    No sob    No leg edema and all resolved    No N/v/d    Doing well       Patient Active Problem List   Diagnosis    Encounter for cardioversion procedure    Leukocytosis    Polycythemia    Obesity (BMI 30-39. 9)    Alcohol abuse    [facility]onsust[facility]) [facility]    Anxiety about health    Irritability    Uncomplicated alcohol withdrawal without perceptual disturbances ([facility])    Chronic systolic congestive heart failure ([facility])    S/P cardiac cath 12/20/2017- Angiographically Patent Coronaries, edp 11 mmhg, ef 25%- med RX    Nonischemic dilated cardiomyopathy ([facility]) EF improved  EF 25 to 45%    PAF (paroxysmal atrial fibrillation) ([facility])    Essential hypertension    Atrial fibrillation with RVR ([facility])       Past Surgical History:   Procedure Laterality Date    CHOLECYSTECTOMY         Allergies   Allergen Reactions    Augmentin [Amoxicillin-Pot Clavulanate] Shortness Of Breath and Swelling        Family History   Problem Relation Age of Onset    Cancer Father         Social History     Socioeconomic History    Marital status: Single     Spouse name: Not on file    Number of children: Not on file    Years of education: Not on file    Highest education level: Not on file   Occupational History    Not on file   Social Needs    Financial resource strain: Not on file    Food insecurity     Worry: Not on file     Inability: Not on file    Transportation needs     Medical: Not on file     Non-medical: Not on file   Tobacco Use    Smoking status: Never Smoker    Smokeless tobacco: Current User     Types: Snuff    Tobacco comment: occasional use   Substance and Sexual Activity    Alcohol use: Yes     Alcohol/week: 3.0 standard drinks     Types: 3 Cans of beer per week     Comment: 3 beers a day    Drug use: No    Sexual activity: Yes     Partners: Female   Lifestyle    Physical activity     Days per week: Not on file     Minutes per session: Not on file    Stress: Not on file   Relationships    Social connections     Talks on phone: Not on file     Gets together: Not on file     Attends [employer] service: Not on file     Active member of club or organization: Not on file     Attends meetings of clubs or organizations: Not on file     Relationship status: Not on file    Intimate partner violence     Fear of current or ex partner: Not on file     Emotionally abused: Not on file     Physically abused: Not on file     Forced sexual activity: Not on file   Other Topics Concern    Not on file   Social History Narrative    Not on file       Current Outpatient Medications   Medication Sig Dispense Refill    lisinopril (PRINIVIL;ZESTRIL) 10 MG tablet Take 1 tablet by mouth daily 90 tablet 2    metoprolol succinate (TOPROL XL) 100 MG extended release tablet Take 1 tablet by mouth 2 times daily 180 tablet 0    ELIQUIS 5 MG TABS tablet TAKE ONE (1) TABLET BY MOUTH TWO (2) TIMES A DAY.  60 tablet 2    bumetanide (BUMEX) 0.5 MG tablet Take 0.5 tablets by mouth daily 45 tablet 3    spironolactone (ALDACTONE) 25 MG tablet Take 0.5 tablets by mouth daily 45 tablet 3    folic acid (FOLVITE) 1 MG tablet Take 1 tablet by mouth daily 90 tablet 3    vitamin B-1 100 MG tablet Take 1 tablet by mouth daily 30 tablet 3    Multiple Vitamin (MULTIVITAMIN) tablet Take 1 tablet by mouth daily 30 tablet 1    digoxin (LANOXIN) 250 MCG tablet Take 1 tablet by mouth daily 90 tablet 3    verapamil (CALAN SR) 120 MG extended release tablet Take 1 tablet by mouth nightly 90 tablet 3     No current facility-administered medications for this visit. Review of Systems -     General ROS: negative  Psychological ROS: negative  Hematological and Lymphatic ROS: No history of blood clots or bleeding disorder. Respiratory ROS: no cough, shortness of breath, or wheezing  Cardiovascular ROS: no chest pain or dyspnea on exertion  Gastrointestinal ROS: negative  Genito-Urinary ROS: no dysuria, trouble voiding, or hematuria  Musculoskeletal ROS: negative  Neurological ROS: no TIA or stroke symptoms  Dermatological ROS: negative      Blood pressure 132/80, pulse 127, height 5' 9\" (1.753 m), weight 206 lb (93.4 kg). Physical Examination:    General appearance - alert, well appearing, and in no distress  Mental status - alert, oriented to person, place, and time  Neck - supple, no significant adenopathy, no JVD, or carotid bruits  Chest - clear to auscultation, no wheezes, rales or rhonchi, symmetric air entry  Heart - normal rate, regular rhythm, normal S1, S2, no murmurs, rubs, clicks or gallops  Abdomen - soft, nontender, nondistended, no masses or organomegaly  Neurological - alert, oriented, normal speech, no focal findings or movement disorder noted  Musculoskeletal - no joint tenderness, deformity or swelling  Extremities - peripheral pulses normal, no pedal edema, no clubbing or cyanosis  Skin - normal coloration and turgor, no rashes, no suspicious skin lesions noted    Lab  No results for input(s): CKTOTAL, CKMB, CKMBINDEX, TROPONINI in the last 72 hours.   CBC:   Lab Results   Component Value Date    WBC 7.6 01/24/2018    RBC 5.41 01/24/2018    HGB 17.4 01/24/2018    HCT 49.4 01/24/2018    MCV 91.3 01/24/2018    MCH 32.2 01/24/2018    MCHC 35.3 01/24/2018    RDW 13.9 01/24/2018     01/24/2018    MPV 8.4 01/24/2018     BMP:    Lab Results   Component Value Date     06/30/2020    K 4.2 06/30/2020    K 4.0 01/24/2018     06/30/2020    CO2 26 06/30/2020    BUN 13 06/30/2020    LABALBU 4.7 06/29/2019    CREATININE 1.0 06/30/2020    CALCIUM 9.5 06/30/2020    LABGLOM 60 06/30/2020    LABGLOM 68 12/30/2019    GLUCOSE 87 06/30/2020     Hepatic Function Panel:    Lab Results   Component Value Date    ALKPHOS 52 06/29/2019    ALT 28 06/29/2019    AST 23 06/29/2019    PROT 7.4 06/29/2019    BILITOT 1.1 06/29/2019    BILIDIR <0.2 06/29/2019    LABALBU 4.7 06/29/2019     Magnesium:    Lab Results   Component Value Date    MG 2.2 06/29/2019     Warfarin PT/INR:  No components found for: PTPATWAR, PTINRWAR  HgBA1c:  No results found for: LABA1C  FLP:    Lab Results   Component Value Date    TRIG 129 06/29/2019    HDL 67 06/29/2019    LDLCALC 128 06/29/2019     TSH:    Lab Results   Component Value Date    TSH 4.45 06/30/2020         EKG 2/22/18  Sinus  Rhythm   Rate 67  WITHIN NORMAL LIMITS    EKG 8/2/18  NSR, NO acute abn    Conclusions      Summary   Left ventricle size is normal.   Normal left ventricular wall thickness. There was mild global hypokinesis of the left ventricle. Systolic function was mildly reduced. Ejection fraction is visually estimated at 45%. The left atrium is Moderately dilated. When this echo is compared with the echo from dec 11, 2017, significant   interval improvement noted with EF from 25 % to 45%. Signature      ----------------------------------------------------------------   Electronically signed by Felecia Pederson MD (Interpreting   physician) on 04/05/2018 at 07:27 PM    EKG 1/3/19  NSR, no acute abn    EKG 7/2/19  Sinus  Rhythm  HR 85  WITHIN NORMAL LIMITS    ekg  Atrial fibrillation  -irregular conduction   BPM   ABNORMAL RHYTHM          Assessment    Home /70 124/80  HR in the 60's    Elevated TESH from amiod  Off amiod for over 6 month now and TSH coming down     Diagnosis Orders   1. Nonischemic dilated cardiomyopathy (HCC) EF improved  EF 25 to 45%     2. Atrial fibrillation with RVR ([address]     3.  Chronic systolic congestive heart failure ([facility]     4. PAF (paroxysmal atrial fibrillation) ([facility] [facility]     5. Essential hypertension     6. S/P cardiac cath 12/20/2017- Angiographically Patent Coronaries, edp 11 mmhg, ef 25%- med RX             Recent admission DX     Episodes of NSVT 13 beat and 160 bpm longest overnight  S/p ATTEMPTED cARDIOVERSION  Leg edema +1 better  1.  Acute systolic congestive heart failure exacerbation with leg edema,  elevated BNP, chest x-ray abnormality, and his BNP is 2274. 2.  Severe cardiomyopathy. PROBABLY TACHY MEDIATED  Ejection fraction 25% to 30%, that is also  newly diagnosed. 3.  Atrial fibrillation with rapid ventricular response newly diagnosed,  not felt much by the patient.  Rate was 201.  Bilateral leg edema of +2.  4.  Elevated BNP more than 2000. 5.  Mild obesity.        Plan   The   current meds and labs reviewed      Patient Seen, Chart, Consults notes, Labs, Radiology studies reviewed. Continue the current treatment and with constant vigilance to changes in symptoms and also any potential side effects. Return for care or seek medical attention immediately if symptoms got worse and/or develop new symptoms. Congestive heart failure: no evidence of fluid overload today, no recent hospitalization for CHF  CONT GENTLE DIURESIS oRAL  Cont  PO BUMEX  0.25 AND ALDACTONE 12.5 po qd  Cont  aldactone to 12.5 po qd     ATR FIB CVR- s/p CV   S/p Cardioversion and now back to atr fib and then had 2nd CV- had been in NSR for a while 18 months and now in atr fib RVR  Cont  toprol xl to 100 bid   cont  apixaban 5 bid  Start calan  po qd  Start  digoxin  0.25 mg po qd    Cont  apixaban 5 po bid started dec 21, 2017    Cardiomyopathy- NICM  Cardiomyopathy: improving, no CHF symptoms, no change in clinical condition.  Will need periodic echocardiograms depending on symptoms.   cmp-TOPROL XL AND LISINOPRIL  EF improved to 45%  No need for iCD  He verbalized understanding the risk  Decrease lisinopril 10 mg po qd  Cont toprol  bid    Hypertension, on medical treatment. Seems to be under good control. Patient is compliant with medical treatment.  Lisinopril 10 mg po qd      Patient Seen, Chart, Consults notes, Labs, Radiology studies reviewed. Discussed use, benefit, and side effects of prescribed medications. All patient questions answered. Pt voiced understanding. Instructed to continue current medications, diet and exercise. Continue risk factor modification and medical management. Patient agreed with treatment plan. Follow up as directed.     Labs   tsh with reflex- abn  TSH 10.7 and FT 4 1.09  Repeat TSH 9.3  Hence off  amiodarone  Losing wt  Repeat TSH  And FT4 in 6 months  PFT- WNL      F/U cardiology in 2 weeks for rate control    Skip Beck [facility]

## 2020-07-23 ENCOUNTER — OFFICE VISIT (OUTPATIENT)
Dept: CARDIOLOGY CLINIC | Age: 60
End: 2020-07-23
Payer: COMMERCIAL

## 2020-07-23 VITALS
HEIGHT: 69 IN | BODY MASS INDEX: 30.51 KG/M2 | WEIGHT: 206 LBS | SYSTOLIC BLOOD PRESSURE: 121 MMHG | HEART RATE: 67 BPM | DIASTOLIC BLOOD PRESSURE: 90 MMHG

## 2020-07-23 PROBLEM — I48.21 PERMANENT ATRIAL FIBRILLATION (HCC): Status: ACTIVE | Noted: 2020-07-23

## 2020-07-23 PROCEDURE — 99214 OFFICE O/P EST MOD 30 MIN: CPT | Performed by: INTERNAL MEDICINE

## 2020-07-23 RX ORDER — DIGOXIN 125 MCG
125 TABLET ORAL DAILY
Qty: 30 TABLET | Refills: 7 | Status: SHIPPED | OUTPATIENT
Start: 2020-07-23 | End: 2021-08-11

## 2020-07-23 NOTE — PROGRESS NOTES
Chief Complaint   Patient presents with    Follow-up   Originally Patient here for hospital follow-up. REASON FOR CONSULTATION:  Evaluation for atrial fibrillation with rapid  ventricular response and cardiomyopathy with ejection fraction 20% to 25%.   PRESENTED WITH LEG EDEMA AND SOB  hX of HAVEN cardioversion. Hx of  atr fib with CVR then back in to NSR      2 week follow up. For atrial  fib with RVR      Denied dizziness, cp, sob, palpitations or edema    No cp    No sob    No leg edema and all resolved    No N/v/d    Doing well       Patient Active Problem List   Diagnosis    Encounter for cardioversion procedure    Leukocytosis    Polycythemia    Obesity (BMI 30-39. 9)    Alcohol abuse    [facility]ust[facility]    Anxiety about health    Irritability    Uncomplicated alcohol withdrawal without perceptual disturbances ([facility])    Chronic systolic congestive heart failure ([facility])    S/P cardiac cath 12/20/2017- Angiographically Patent Coronaries, edp 11 mmhg, ef 25%- med RX    Nonischemic dilated cardiomyopathy ([facility]) EF improved  EF 25 to 45%    PAF (paroxysmal atrial fibrillation) ([facility])    Essential hypertension    Atrial fibrillation with RVR ([facility])    Permanent atrial fibrillation       Past Surgical History:   Procedure Laterality Date    CHOLECYSTECTOMY         Allergies   Allergen Reactions    Augmentin [Amoxicillin-Pot Clavulanate] Shortness Of Breath and Swelling        Family History   Problem Relation Age of Onset    Cancer Father         Social History     Socioeconomic History    Marital status: Single     Spouse name: Not on file    Number of children: Not on file    Years of education: Not on file    Highest education level: Not on file   Occupational History    Not on file   Social Needs    Financial resource strain: Not on file    Food insecurity     Worry: Not on file     Inability: Not on file    Transportation needs     Medical: Not on file Non-medical: Not on file   Tobacco Use    Smoking status: Never Smoker    Smokeless tobacco: Current User     Types: Snuff    Tobacco comment: occasional use   Substance and Sexual Activity    Alcohol use: Yes     Alcohol/week: 3.0 standard drinks     Types: 3 Cans of beer per week     Comment: 3 beers a day    Drug use: No    Sexual activity: Yes     Partners: Female   Lifestyle    Physical activity     Days per week: Not on file     Minutes per session: Not on file    Stress: Not on file   Relationships    Social connections     Talks on phone: Not on file     Gets together: Not on file     Attends Sabianism service: Not on file     Active member of club or organization: Not on file     Attends meetings of clubs or organizations: Not on file     Relationship status: Not on file    Intimate partner violence     Fear of current or ex partner: Not on file     Emotionally abused: Not on file     Physically abused: Not on file     Forced sexual activity: Not on file   Other Topics Concern    Not on file   Social History Narrative    Not on file       Current Outpatient Medications   Medication Sig Dispense Refill    digoxin (LANOXIN) 125 MCG tablet Take 1 tablet by mouth daily 30 tablet 7    lisinopril (PRINIVIL;ZESTRIL) 10 MG tablet Take 1 tablet by mouth daily 90 tablet 2    verapamil (CALAN SR) 120 MG extended release tablet Take 1 tablet by mouth nightly 90 tablet 3    metoprolol succinate (TOPROL XL) 100 MG extended release tablet Take 1 tablet by mouth 2 times daily 180 tablet 0    ELIQUIS 5 MG TABS tablet TAKE ONE (1) TABLET BY MOUTH TWO (2) TIMES A DAY.  60 tablet 2    bumetanide (BUMEX) 0.5 MG tablet Take 0.5 tablets by mouth daily 45 tablet 3    spironolactone (ALDACTONE) 25 MG tablet Take 0.5 tablets by mouth daily 45 tablet 3    folic acid (FOLVITE) 1 MG tablet Take 1 tablet by mouth daily 90 tablet 3    vitamin B-1 100 MG tablet Take 1 tablet by mouth daily 30 tablet 3    Multiple Vitamin (MULTIVITAMIN) tablet Take 1 tablet by mouth daily 30 tablet 1     No current facility-administered medications for this visit. Review of Systems -     General ROS: negative  Psychological ROS: negative  Hematological and Lymphatic ROS: No history of blood clots or bleeding disorder. Respiratory ROS: no cough, shortness of breath, or wheezing  Cardiovascular ROS: no chest pain or dyspnea on exertion  Gastrointestinal ROS: negative  Genito-Urinary ROS: no dysuria, trouble voiding, or hematuria  Musculoskeletal ROS: negative  Neurological ROS: no TIA or stroke symptoms  Dermatological ROS: negative      Blood pressure (!) 121/90, pulse 67, height 5' 9\" (1.753 m), weight 206 lb (93.4 kg). Physical Examination:    General appearance - alert, well appearing, and in no distress  Mental status - alert, oriented to person, place, and time  Neck - supple, no significant adenopathy, no JVD, or carotid bruits  Chest - clear to auscultation, no wheezes, rales or rhonchi, symmetric air entry  Heart - normal rate, regular rhythm, normal S1, S2, no murmurs, rubs, clicks or gallops  Abdomen - soft, nontender, nondistended, no masses or organomegaly  Neurological - alert, oriented, normal speech, no focal findings or movement disorder noted  Musculoskeletal - no joint tenderness, deformity or swelling  Extremities - peripheral pulses normal, no pedal edema, no clubbing or cyanosis  Skin - normal coloration and turgor, no rashes, no suspicious skin lesions noted    Lab  No results for input(s): CKTOTAL, CKMB, CKMBINDEX, TROPONINI in the last 72 hours.   CBC:   Lab Results   Component Value Date    WBC 7.6 01/24/2018    RBC 5.41 01/24/2018    HGB 17.4 01/24/2018    HCT 49.4 01/24/2018    MCV 91.3 01/24/2018    MCH 32.2 01/24/2018    MCHC 35.3 01/24/2018    RDW 13.9 01/24/2018     01/24/2018    MPV 8.4 01/24/2018     BMP:    Lab Results   Component Value Date     06/30/2020    K 4.2 06/30/2020    K 4.0 01/24/2018     06/30/2020    CO2 26 06/30/2020    BUN 13 06/30/2020    LABALBU 4.7 06/29/2019    CREATININE 1.0 06/30/2020    CALCIUM 9.5 06/30/2020    LABGLOM 60 06/30/2020    LABGLOM 68 12/30/2019    GLUCOSE 87 06/30/2020     Hepatic Function Panel:    Lab Results   Component Value Date    ALKPHOS 52 06/29/2019    ALT 28 06/29/2019    AST 23 06/29/2019    PROT 7.4 06/29/2019    BILITOT 1.1 06/29/2019    BILIDIR <0.2 06/29/2019    LABALBU 4.7 06/29/2019     Magnesium:    Lab Results   Component Value Date    MG 2.2 06/29/2019     Warfarin PT/INR:  No components found for: PTPATWAR, PTINRWAR  HgBA1c:  No results found for: LABA1C  FLP:    Lab Results   Component Value Date    TRIG 129 06/29/2019    HDL 67 06/29/2019    LDLCALC 128 06/29/2019     TSH:    Lab Results   Component Value Date    TSH 4.45 06/30/2020         EKG 2/22/18  Sinus  Rhythm   Rate 67  WITHIN NORMAL LIMITS    EKG 8/2/18  NSR, NO acute abn    Conclusions      Summary   Left ventricle size is normal.   Normal left ventricular wall thickness. There was mild global hypokinesis of the left ventricle. Systolic function was mildly reduced. Ejection fraction is visually estimated at 45%. The left atrium is Moderately dilated. When this echo is compared with the echo from dec 11, 2017, significant   interval improvement noted with EF from 25 % to 45%. Signature      ----------------------------------------------------------------   Electronically signed by Regino Roberts MD (Interpreting   physician) on 04/05/2018 at 07:27 PM    EKG 1/3/19  NSR, no acute abn    EKG 7/2/19  Sinus  Rhythm  HR 85  WITHIN NORMAL LIMITS    ekg 7/7/2020  Atrial fibrillation  -irregular conduction   BPM   ABNORMAL RHYTHM    EKG 7/23/2020  Atrial FIB with CVR 81 BPM    Assessment    Home /70 124/80  HR in the 60's    Elevated TESH from amiod  Off amiod for over 6 month now and TSH coming down     Diagnosis Orders   1.  Permanent atrial fibrillation     2. Chronic systolic congestive heart failure ([facility] [address]     3. Nonischemic dilated cardiomyopathy (HCC) EF improved  EF 25 to 45%     4. Essential hypertension     5. NSVT (nonsustained ventricular tachycardia) ([facility])     6. S/P cardiac cath 12/20/2017- Angiographically Patent Coronaries, edp 11 mmhg, ef 25%- med RX             Recent admission DX     Episodes of NSVT 13 beat and 160 bpm longest overnight  S/p ATTEMPTED cARDIOVERSION  Leg edema +1 better  1.  Acute systolic congestive heart failure exacerbation with leg edema,  elevated BNP, chest x-ray abnormality, and his BNP is 2274. 2.  Severe cardiomyopathy. PROBABLY TACHY MEDIATED  Ejection fraction 25% to 30%, that is also  newly diagnosed. 3.  Atrial fibrillation with rapid ventricular response newly diagnosed,  not felt much by the patient.  Rate was 201.  Bilateral leg edema of +2.  4.  Elevated BNP more than 2000. 5.  Mild obesity.        Plan   The  Most  current meds and labs reviewed    Patient Seen, Chart, Consults notes, Labs, Radiology studies reviewed. Continue the current treatment and with constant vigilance to changes in symptoms and also any potential side effects. Return for care or seek medical attention immediately if symptoms got worse and/or develop new symptoms. Congestive heart failure: no evidence of fluid overload today, no recent hospitalization for CHF  CONT GENTLE DIURESIS oRAL  Cont  PO BUMEX  0.25 AND ALDACTONE 12.5 po qd  Cont  aldactone to 12.5 po qd    S/p Cardioversion and now back to atr fib and then had 2nd CV- had been in NSR for a while 18 months and now in atr fib   Now rate controlled  ATR FIB CVR- want med RX  Cont  toprol xl to 100 bid   cont  apixaban 5 bid  Cont  calan  po qd  Decrease   digoxin  0.125 mg po qd    Cont  apixaban 5 po bid started dec 21, 2017    Cardiomyopathy- NICM  Cardiomyopathy: improving, no CHF symptoms, no change in clinical condition.  Will need periodic echocardiograms depending on symptoms.   cmp-TOPROL XL AND LISINOPRIL  EF improved to 45%  No need for iCD  He verbalized understanding the risk  Cont    lisinopril 10 mg po qd  Cont toprol  bid    Hypertension, on medical treatment. Seems to be under good control. Patient is compliant with medical treatment.  Lisinopril 10 mg po qd      Patient Seen, Chart, Consults notes, Labs, Radiology studies reviewed. Discussed use, benefit, and side effects of prescribed medications. All patient questions answered. Pt voiced understanding. Instructed to continue current medications, diet and exercise. Continue risk factor modification and medical management. Patient agreed with treatment plan. Follow up as directed.     Labs   Hx of tsh with reflex- abn dec 2019  TSH 10.7 and FT 4 1.09, Repeat TSH 9.3-Hence off  amiodarone  Repeat TSH  And FT4 I 2020 WNL  PFT- WNL      I spent 25 minutes involved in face-to-face discussion of medical issues, prognosis, record review  and plan with the patient today and more than 50% of the time was spent on counseling and coordination of care      F/U cardiology in 4 months      [facility] [address]

## 2020-08-12 RX ORDER — APIXABAN 5 MG/1
TABLET, FILM COATED ORAL
Qty: 60 TABLET | Refills: 3 | Status: SHIPPED | OUTPATIENT
Start: 2020-08-12 | End: 2020-12-22 | Stop reason: SDUPTHER

## 2020-10-08 RX ORDER — METOPROLOL SUCCINATE 100 MG/1
100 TABLET, EXTENDED RELEASE ORAL 2 TIMES DAILY
Qty: 180 TABLET | Refills: 0 | Status: SHIPPED | OUTPATIENT
Start: 2020-10-08 | End: 2020-12-22 | Stop reason: SDUPTHER

## 2020-12-21 LAB
ANION GAP SERPL CALCULATED.3IONS-SCNC: 4 MMOL/L (ref 4–12)
BUN BLDV-MCNC: 13 MG/DL (ref 7–20)
CALCIUM SERPL-MCNC: 9.3 MG/DL (ref 8.8–10.5)
CHLORIDE BLD-SCNC: 105 MEQ/L (ref 101–111)
CO2: 29 MEQ/L (ref 21–32)
CREAT SERPL-MCNC: 0.99 MG/DL (ref 0.6–1.3)
CREATININE CLEARANCE: >60
GLUCOSE: 98 MG/DL (ref 70–110)
MAGNESIUM: 1.9 MG/DL (ref 1.8–2.5)
POTASSIUM SERPL-SCNC: 4.6 MEQ/L (ref 3.6–5)
SODIUM BLD-SCNC: 138 MEQ/L (ref 135–145)

## 2020-12-22 ENCOUNTER — OFFICE VISIT (OUTPATIENT)
Dept: CARDIOLOGY CLINIC | Age: 60
End: 2020-12-22
Payer: COMMERCIAL

## 2020-12-22 ENCOUNTER — TELEPHONE (OUTPATIENT)
Dept: CARDIOLOGY CLINIC | Age: 60
End: 2020-12-22

## 2020-12-22 VITALS
BODY MASS INDEX: 30.64 KG/M2 | HEART RATE: 77 BPM | HEIGHT: 70 IN | SYSTOLIC BLOOD PRESSURE: 115 MMHG | DIASTOLIC BLOOD PRESSURE: 77 MMHG | WEIGHT: 214 LBS

## 2020-12-22 PROCEDURE — 99214 OFFICE O/P EST MOD 30 MIN: CPT | Performed by: INTERNAL MEDICINE

## 2020-12-22 RX ORDER — FOLIC ACID 1 MG/1
1 TABLET ORAL DAILY
Qty: 90 TABLET | Refills: 2 | Status: SHIPPED | OUTPATIENT
Start: 2020-12-22 | End: 2021-08-17 | Stop reason: SDUPTHER

## 2020-12-22 RX ORDER — SPIRONOLACTONE 25 MG/1
12.5 TABLET ORAL DAILY
Qty: 45 TABLET | Refills: 2 | Status: SHIPPED | OUTPATIENT
Start: 2020-12-22 | End: 2021-08-17 | Stop reason: SDUPTHER

## 2020-12-22 RX ORDER — METOPROLOL SUCCINATE 100 MG/1
100 TABLET, EXTENDED RELEASE ORAL 2 TIMES DAILY
Qty: 180 TABLET | Refills: 2 | Status: SHIPPED | OUTPATIENT
Start: 2020-12-22 | End: 2021-08-17 | Stop reason: SDUPTHER

## 2020-12-22 NOTE — TELEPHONE ENCOUNTER
Patient seen in office today.  The labs you requested from [facility] are scanned into media

## 2020-12-22 NOTE — PROGRESS NOTES
Chief Complaint   Patient presents with    Follow-up     four month follow up     Atrial Fibrillation   Originally Patient here for hospital follow-up. REASON FOR CONSULTATION:  Evaluation for atrial fibrillation with rapid  ventricular response and cardiomyopathy with ejection fraction 20% to 25%.   PRESENTED WITH LEG EDEMA AND SOB  hX of HAVEN cardioversion. Hx of  atr fib with CVR then back in to NSR      PT IS HERE FOR A 6 MONTH FOLLOW UP. CHF, CMP and atr fib  Feel good    No palpitations     Denied dizziness, cp, sob, palpitations or edema    No cp    No sob    No leg edema and all resolved    No N/v/d    Doing well       Patient Active Problem List   Diagnosis    Encounter for cardioversion procedure    Leukocytosis    Polycythemia    Obesity (BMI 30-39. 9)    Alcohol abuse    [facility]onsust[facility]cardia) ([facility])    Anxiety about health    Irritability    Uncomplicated alcohol withdrawal without perceptual disturbances ([facility])    Chronic systolic congestive heart failure (HCC)    S/P cardiac cath 12/20/2017- Angiographically Patent Coronaries, edp 11 mmhg, ef 25%- med RX    Nonischemic dilated cardiomyopathy ([facility]) EF improved  EF 25 to 45%    PAF (paroxysmal atrial fibrillation) ([facility])    Essential hypertension    Atrial fibrillation with RVR ([facility])    Permanent atrial fibrillation ([facility])       Past Surgical History:   Procedure Laterality Date    CHOLECYSTECTOMY         Allergies   Allergen Reactions    Augmentin [Amoxicillin-Pot Clavulanate] Shortness Of Breath and Swelling        Family History   Problem Relation Age of Onset    Cancer Father         Social History     Socioeconomic History    Marital status: Single     Spouse name: Not on file    Number of children: Not on file    Years of education: Not on file    Highest education level: Not on file   Occupational History    Not on file   Social Needs    Financial resource strain: Not on file    Food insecurity mouth daily 45 tablet 3    vitamin B-1 100 MG tablet Take 1 tablet by mouth daily 30 tablet 3    Multiple Vitamin (MULTIVITAMIN) tablet Take 1 tablet by mouth daily 30 tablet 1     No current facility-administered medications for this visit. Review of Systems -     General ROS: negative  Psychological ROS: negative  Hematological and Lymphatic ROS: No history of blood clots or bleeding disorder. Respiratory ROS: no cough, shortness of breath, or wheezing  Cardiovascular ROS: no chest pain or dyspnea on exertion  Gastrointestinal ROS: negative  Genito-Urinary ROS: no dysuria, trouble voiding, or hematuria  Musculoskeletal ROS: negative  Neurological ROS: no TIA or stroke symptoms  Dermatological ROS: negative      Blood pressure 115/77, pulse 77, height 5' 10\" (1.778 m), weight 214 lb (97.1 kg). Physical Examination:    General appearance - alert, well appearing, and in no distress  Mental status - alert, oriented to person, place, and time  Neck - supple, no significant adenopathy, no JVD, or carotid bruits  Chest - clear to auscultation, no wheezes, rales or rhonchi, symmetric air entry  Heart - normal rate, regular rhythm, normal S1, S2, no murmurs, rubs, clicks or gallops  Abdomen - soft, nontender, nondistended, no masses or organomegaly  Neurological - alert, oriented, normal speech, no focal findings or movement disorder noted  Musculoskeletal - no joint tenderness, deformity or swelling  Extremities - peripheral pulses normal, no pedal edema, no clubbing or cyanosis  Skin - normal coloration and turgor, no rashes, no suspicious skin lesions noted    Lab  No results for input(s): CKTOTAL, CKMB, CKMBINDEX, TROPONINI in the last 72 hours.   CBC:   Lab Results   Component Value Date    WBC 7.6 01/24/2018    RBC 5.41 01/24/2018    HGB 17.4 01/24/2018    HCT 49.4 01/24/2018    MCV 91.3 01/24/2018    MCH 32.2 01/24/2018    MCHC 35.3 01/24/2018    RDW 13.9 01/24/2018     01/24/2018    MPV 8.4 01/24/2018     BMP:    Lab Results   Component Value Date     06/30/2020    K 4.2 06/30/2020    K 4.0 01/24/2018     06/30/2020    CO2 26 06/30/2020    BUN 13 06/30/2020    LABALBU 4.7 06/29/2019    CREATININE 1.0 06/30/2020    CALCIUM 9.5 06/30/2020    LABGLOM 60 06/30/2020    LABGLOM 68 12/30/2019    GLUCOSE 87 06/30/2020     Hepatic Function Panel:    Lab Results   Component Value Date    ALKPHOS 52 06/29/2019    ALT 28 06/29/2019    AST 23 06/29/2019    PROT 7.4 06/29/2019    BILITOT 1.1 06/29/2019    BILIDIR <0.2 06/29/2019    LABALBU 4.7 06/29/2019     Magnesium:    Lab Results   Component Value Date    MG 2.2 06/29/2019     Warfarin PT/INR:  No components found for: PTPATWAR, PTINRWAR  HgBA1c:  No results found for: LABA1C  FLP:    Lab Results   Component Value Date    TRIG 129 06/29/2019    HDL 67 06/29/2019    LDLCALC 128 06/29/2019     TSH:    Lab Results   Component Value Date    TSH 4.45 06/30/2020         EKG 2/22/18  Sinus  Rhythm   Rate 67  WITHIN NORMAL LIMITS    EKG 8/2/18  NSR, NO acute abn    Conclusions      Summary   Left ventricle size is normal.   Normal left ventricular wall thickness. There was mild global hypokinesis of the left ventricle. Systolic function was mildly reduced. Ejection fraction is visually estimated at 45%. The left atrium is Moderately dilated. When this echo is compared with the echo from dec 11, 2017, significant   interval improvement noted with EF from 25 % to 45%.       Signature      ----------------------------------------------------------------   Electronically signed by Melissa Velasquez MD (Interpreting   physician) on 04/05/2018 at 07:27 PM    EKG 1/3/19  NSR, no acute abn    EKG 7/2/19  Sinus  Rhythm  HR 85  WITHIN NORMAL LIMITS    ekg 7/7/2020  Atrial fibrillation  -irregular conduction   BPM   ABNORMAL RHYTHM    EKG 7/23/2020  Atrial FIB with CVR 81 BPM    Assessment    Home /70 124/80  HR in the 60's    Elevated TSH from amiod  Off amiod for over 6 month now and TSH coming down     Diagnosis Orders   1. Chronic systolic congestive heart failure ([facility] [address]     2. Nonischemic dilated cardiomyopathy (HCC) EF improved  EF 25 to 45%     3. Permanent atrial fibrillation ([facility] [address]     4. Essential hypertension     5. S/P cardiac cath 12/20/2017- Angiographically Patent Coronaries, edp 11 mmhg, ef 25%- med RX             Recent admission DX     Episodes of NSVT 13 beat and 160 bpm longest overnight  S/p ATTEMPTED cARDIOVERSION  Leg edema +1 better  1.  Acute systolic congestive heart failure exacerbation with leg edema,  elevated BNP, chest x-ray abnormality, and his BNP is 2274. 2.  Severe cardiomyopathy. PROBABLY TACHY MEDIATED  Ejection fraction 25% to 30%, that is also  newly diagnosed. 3.  Atrial fibrillation with rapid ventricular response newly diagnosed,  not felt much by the patient.  Rate was 201.  Bilateral leg edema of +2.  4.  Elevated BNP more than 2000. 5.  Mild obesity.        Plan   The  current meds and labs reviewed    Patient Seen, Chart, Consults notes, Labs, Radiology studies reviewed. Continue the current treatment and with constant vigilance to changes in symptoms and also any potential side effects. Return for care or seek medical attention immediately if symptoms got worse and/or develop new symptoms.     Congestive heart failure: no evidence of fluid overload today, no recent hospitalization for CHF  CONT GENTLE DIURESIS oRAL  Cont  PO BUMEX  0.25 AND ALDACTONE 12.5 po qd  Cont  aldactone to 12.5 po qd      Hx of  Cardioversion and now back to atr fib and then had 2nd CV- had been in NSR for a while 18 months and now in atr fib   Now rate controlled  Permanent ATR FIB CVR- want med RX  Cont  toprol xl to 100 bid   cont  apixaban 5 bid  Cont  calan  po qd  Cont    digoxin  0.125 mg po qd    Cont  apixaban 5 po bid started dec 21, 2017    Cardiomyopathy- NICM  Cardiomyopathy: improving, no CHF symptoms, no change in clinical condition. Will need periodic echocardiograms depending on symptoms.   cmp-TOPROL XL AND LISINOPRIL  EF improved to 45%  No need for iCD  He verbalized understanding the risk  Cont    lisinopril 10 mg po qd  Cont toprol  bid    Hypertension, on medical treatment. Seems to be under good control. Patient is compliant with medical treatment.  Lisinopril 10 mg po qd      Patient Seen, Chart, Consults notes, Labs, Radiology studies reviewed. Discussed use, benefit, and side effects of prescribed medications. All patient questions answered. Pt voiced understanding. Instructed to continue current medications, diet and exercise. Continue risk factor modification and medical management. Patient agreed with treatment plan. Follow up as directed.     Hx of abn Labs   Hx of tsh with reflex- abn dec 2019  TSH 10.7 and FT 4 1.09, Repeat TSH 9.3-Hence off  amiodarone  Repeat TSH  And FT4 I 2020 WNL  PFT- WNL    I spent 25 minutes involved in face-to-face discussion of medical issues, prognosis, record review  and plan with the patient today and more than 50% of the time was spent on counseling and coordination of care      F/U cardiology in 5 months      [facility]

## 2021-03-04 ENCOUNTER — OFFICE VISIT (OUTPATIENT)
Dept: FAMILY MEDICINE CLINIC | Age: 61
End: 2021-03-04
Payer: COMMERCIAL

## 2021-03-04 VITALS
BODY MASS INDEX: 30.81 KG/M2 | DIASTOLIC BLOOD PRESSURE: 76 MMHG | RESPIRATION RATE: 16 BRPM | HEART RATE: 89 BPM | SYSTOLIC BLOOD PRESSURE: 122 MMHG | HEIGHT: 70 IN | OXYGEN SATURATION: 99 % | WEIGHT: 215.2 LBS | TEMPERATURE: 96.7 F

## 2021-03-04 DIAGNOSIS — M10.071 ACUTE IDIOPATHIC GOUT OF RIGHT FOOT: Primary | ICD-10-CM

## 2021-03-04 PROCEDURE — 99213 OFFICE O/P EST LOW 20 MIN: CPT | Performed by: NURSE PRACTITIONER

## 2021-03-04 RX ORDER — PREDNISONE 20 MG/1
20 TABLET ORAL 2 TIMES DAILY
Qty: 10 TABLET | Refills: 0 | Status: SHIPPED | OUTPATIENT
Start: 2021-03-04 | End: 2021-03-09

## 2021-03-04 ASSESSMENT — PATIENT HEALTH QUESTIONNAIRE - PHQ9
SUM OF ALL RESPONSES TO PHQ QUESTIONS 1-9: 0
SUM OF ALL RESPONSES TO PHQ QUESTIONS 1-9: 0

## 2021-03-10 ASSESSMENT — ENCOUNTER SYMPTOMS
COUGH: 0
SORE THROAT: 0
VOMITING: 0
ABDOMINAL DISTENTION: 0
CONSTIPATION: 0
STRIDOR: 0
SINUS PRESSURE: 0
NAUSEA: 0
CHEST TIGHTNESS: 0
DIARRHEA: 0
SHORTNESS OF BREATH: 0
ABDOMINAL PAIN: 0
BACK PAIN: 0
WHEEZING: 0
COLOR CHANGE: 0

## 2021-03-17 RX ORDER — BUMETANIDE 0.5 MG/1
TABLET ORAL
Qty: 45 TABLET | Refills: 0 | Status: SHIPPED | OUTPATIENT
Start: 2021-03-17 | End: 2021-08-17 | Stop reason: SDUPTHER

## 2021-04-01 RX ORDER — LISINOPRIL 10 MG/1
10 TABLET ORAL DAILY
Qty: 90 TABLET | Refills: 2 | Status: SHIPPED | OUTPATIENT
Start: 2021-04-01 | End: 2021-08-17 | Stop reason: SDUPTHER

## 2021-08-11 LAB
ANION GAP SERPL CALCULATED.3IONS-SCNC: 8 MMOL/L (ref 4–12)
BUN BLDV-MCNC: 14 MG/DL (ref 7–20)
CALCIUM SERPL-MCNC: 8.5 MG/DL (ref 8.8–10.5)
CHLORIDE BLD-SCNC: 106 MEQ/L (ref 101–111)
CO2: 25 MEQ/L (ref 21–32)
CREAT SERPL-MCNC: 0.93 MG/DL (ref 0.6–1.3)
CREATININE CLEARANCE: >60
GLUCOSE: 116 MG/DL (ref 70–110)
MAGNESIUM: 2 MG/DL (ref 1.8–2.5)
POTASSIUM SERPL-SCNC: 4.2 MEQ/L (ref 3.6–5)
SODIUM BLD-SCNC: 139 MEQ/L (ref 135–145)

## 2021-08-11 RX ORDER — DIGOXIN 125 MCG
TABLET ORAL
Qty: 30 TABLET | Refills: 0 | Status: SHIPPED | OUTPATIENT
Start: 2021-08-11 | End: 2021-08-17 | Stop reason: SDUPTHER

## 2021-08-17 ENCOUNTER — OFFICE VISIT (OUTPATIENT)
Dept: CARDIOLOGY CLINIC | Age: 61
End: 2021-08-17
Payer: COMMERCIAL

## 2021-08-17 VITALS
WEIGHT: 212 LBS | SYSTOLIC BLOOD PRESSURE: 110 MMHG | DIASTOLIC BLOOD PRESSURE: 76 MMHG | HEIGHT: 70 IN | BODY MASS INDEX: 30.35 KG/M2 | HEART RATE: 98 BPM

## 2021-08-17 DIAGNOSIS — I48.21 PERMANENT ATRIAL FIBRILLATION (HCC): ICD-10-CM

## 2021-08-17 DIAGNOSIS — I47.29 NSVT (NONSUSTAINED VENTRICULAR TACHYCARDIA): ICD-10-CM

## 2021-08-17 DIAGNOSIS — I50.22 CHRONIC SYSTOLIC CONGESTIVE HEART FAILURE (HCC): ICD-10-CM

## 2021-08-17 DIAGNOSIS — Z98.890 S/P CARDIAC CATH: ICD-10-CM

## 2021-08-17 DIAGNOSIS — I10 ESSENTIAL HYPERTENSION: ICD-10-CM

## 2021-08-17 DIAGNOSIS — I42.0 NONISCHEMIC DILATED CARDIOMYOPATHY (HCC): Primary | ICD-10-CM

## 2021-08-17 PROBLEM — I48.0 PAF (PAROXYSMAL ATRIAL FIBRILLATION) (HCC): Status: RESOLVED | Noted: 2018-02-22 | Resolved: 2021-08-17

## 2021-08-17 PROBLEM — I48.91 ATRIAL FIBRILLATION WITH RVR (HCC): Status: RESOLVED | Noted: 2020-07-07 | Resolved: 2021-08-17

## 2021-08-17 PROCEDURE — 93000 ELECTROCARDIOGRAM COMPLETE: CPT | Performed by: INTERNAL MEDICINE

## 2021-08-17 PROCEDURE — 99214 OFFICE O/P EST MOD 30 MIN: CPT | Performed by: INTERNAL MEDICINE

## 2021-08-17 RX ORDER — SPIRONOLACTONE 25 MG/1
12.5 TABLET ORAL DAILY
Qty: 45 TABLET | Refills: 2 | Status: SHIPPED | OUTPATIENT
Start: 2021-08-17 | End: 2022-06-29 | Stop reason: SDUPTHER

## 2021-08-17 RX ORDER — METOPROLOL SUCCINATE 100 MG/1
100 TABLET, EXTENDED RELEASE ORAL 2 TIMES DAILY
Qty: 180 TABLET | Refills: 3 | Status: SHIPPED | OUTPATIENT
Start: 2021-08-17 | End: 2022-09-27 | Stop reason: SDUPTHER

## 2021-08-17 RX ORDER — BUMETANIDE 0.5 MG/1
TABLET ORAL
Qty: 45 TABLET | Refills: 3 | Status: SHIPPED | OUTPATIENT
Start: 2021-08-17 | End: 2022-09-27 | Stop reason: SDUPTHER

## 2021-08-17 RX ORDER — FOLIC ACID 1 MG/1
1 TABLET ORAL DAILY
Qty: 90 TABLET | Refills: 3 | Status: SHIPPED | OUTPATIENT
Start: 2021-08-17 | End: 2022-09-27 | Stop reason: SDUPTHER

## 2021-08-17 RX ORDER — DIGOXIN 125 MCG
TABLET ORAL
Qty: 90 TABLET | Refills: 3 | Status: SHIPPED | OUTPATIENT
Start: 2021-08-17 | End: 2022-09-14

## 2021-08-17 RX ORDER — LISINOPRIL 2.5 MG/1
2.5 TABLET ORAL DAILY
Qty: 90 TABLET | Refills: 3 | Status: SHIPPED | OUTPATIENT
Start: 2021-08-17 | End: 2022-09-14

## 2021-08-17 NOTE — PROGRESS NOTES
Chief Complaint   Patient presents with    Check-Up    Congestive Heart Failure    Atrial Fibrillation    Cardiomyopathy   Originally Patient here for hospital follow-up. REASON FOR CONSULTATION:  Evaluation for atrial fibrillation with rapid  ventricular response and cardiomyopathy with ejection fraction 20% to 25%.   PRESENTED WITH LEG EDEMA AND SOB  hX of HAVEN cardioversion. Hx of  atr fib with CVR then back in to NSR      PT IS HERE FOR A 6 MONTH FOLLOW UP. CHF, CMP and atr fib    EKG done today    Rarely palpitations at night    Denied dizziness, cp, sob, or edema    No cp    No sob    No leg edema and all resolved    No N/v/d    Doing well       Patient Active Problem List   Diagnosis    Encounter for cardioversion procedure    Leukocytosis    Polycythemia    Obesity (BMI 30-39. 9)    Alcohol abuse    NSVT (nonsustained ventricular tachycardia) (HCC)    Anxiety about health    Irritability    Uncomplicated alcohol withdrawal without perceptual disturbances (HCC)    Chronic systolic congestive heart failure (HCC)    S/P cardiac cath 12/20/2017- Angiographically Patent Coronaries, edp 11 mmhg, ef 25%- med RX    Nonischemic dilated cardiomyopathy (HCC) EF improved  EF 25 to 45%    Essential hypertension    Permanent atrial fibrillation (HCC)       Past Surgical History:   Procedure Laterality Date    CHOLECYSTECTOMY         Allergies   Allergen Reactions    Augmentin [Amoxicillin-Pot Clavulanate] Shortness Of Breath and Swelling        Family History   Problem Relation Age of Onset    Cancer Father         Social History     Socioeconomic History    Marital status: Single     Spouse name: Not on file    Number of children: Not on file    Years of education: Not on file    Highest education level: Not on file   Occupational History    Not on file   Tobacco Use    Smoking status: Never Smoker    Smokeless tobacco: Former User     Types: Snuff    Tobacco comment: occasional use   Vaping Use    Vaping Use: Never used   Substance and Sexual Activity    Alcohol use: Yes     Alcohol/week: 3.0 standard drinks     Types: 3 Cans of beer per week     Comment: 3 beers a day    Drug use: No    Sexual activity: Yes     Partners: Female   Other Topics Concern    Not on file   Social History Narrative    Not on file     Social Determinants of Health     Financial Resource Strain:     Difficulty of Paying Living Expenses:    Food Insecurity:     Worried About Running Out of Food in the Last Year:     [address] in the Last Year:    Transportation Needs:     Lack of Transportation (Medical):  Lack of Transportation (Non-Medical):    Physical Activity:     Days of Exercise per Week:     Minutes of Exercise per Session:    Stress:     Feeling of Stress :    Social Connections:     Frequency of Communication with Friends and Family:     Frequency of Social Gatherings with Friends and Family:     Attends Sabian[employer] Services:     Active Member of Clubs or Organizations:     Attends Club or Organization Meetings:     Marital Status:    Intimate Partner Violence:     Fear of Current or Ex-Partner:     Emotionally Abused:     Physically Abused:     Sexually Abused:        Current Outpatient Medications   Medication Sig Dispense Refill    digoxin (LANOXIN) 125 MCG tablet TAKE ONE (1) TABLET BY MOUTH ONCE DAILY. 90 tablet 3    verapamil (CALAN SR) 180 MG extended release tablet TAKE ONE (1) TABLET BY MOUTH ONCE DAILY AT BEDTIME. 90 tablet 3    lisinopril (PRINIVIL;ZESTRIL) 2.5 MG tablet Take 1 tablet by mouth daily 90 tablet 3    bumetanide (BUMEX) 0.5 MG tablet TAKE ONE-HALF (1/2) TABLET BY MOUTH ONCE DAILY. 45 tablet 3    apixaban (ELIQUIS) 5 MG TABS tablet TAKE ONE (1) TABLET BY MOUTH TWO (2) TIMES A DAY.  180 tablet 3    metoprolol succinate (TOPROL XL) 100 MG extended release tablet Take 1 tablet by mouth 2 times daily 529 tablet 3    folic acid (FOLVITE) 1 MG tablet Take 1 tablet by mouth daily 90 tablet 3    spironolactone (ALDACTONE) 25 MG tablet Take 0.5 tablets by mouth daily 45 tablet 2    vitamin B-1 100 MG tablet Take 1 tablet by mouth daily 30 tablet 3    Multiple Vitamin (MULTIVITAMIN) tablet Take 1 tablet by mouth daily 30 tablet 1     No current facility-administered medications for this visit. Review of Systems -     General ROS: negative  Psychological ROS: negative  Hematological and Lymphatic ROS: No history of blood clots or bleeding disorder. Respiratory ROS: no cough, shortness of breath, or wheezing  Cardiovascular ROS: no chest pain or dyspnea on exertion  Gastrointestinal ROS: negative  Genito-Urinary ROS: no dysuria, trouble voiding, or hematuria  Musculoskeletal ROS: negative  Neurological ROS: no TIA or stroke symptoms  Dermatological ROS: negative      Blood pressure 110/76, pulse 98, height 5' 10\" (1.778 m), weight 212 lb (96.2 kg). Physical Examination:    General appearance - alert, well appearing, and in no distress  Mental status - alert, oriented to person, place, and time  Neck - supple, no significant adenopathy, no JVD, or carotid bruits  Chest - clear to auscultation, no wheezes, rales or rhonchi, symmetric air entry  Heart - normal rate, regular rhythm, normal S1, S2, no murmurs, rubs, clicks or gallops  Abdomen - soft, nontender, nondistended, no masses or organomegaly  Neurological - alert, oriented, normal speech, no focal findings or movement disorder noted  Musculoskeletal - no joint tenderness, deformity or swelling  Extremities - peripheral pulses normal, no pedal edema, no clubbing or cyanosis  Skin - normal coloration and turgor, no rashes, no suspicious skin lesions noted    Lab  No results for input(s): CKTOTAL, CKMB, CKMBINDEX, TROPONINI in the last 72 hours.   CBC:   Lab Results   Component Value Date    WBC 7.6 01/24/2018    RBC 5.41 01/24/2018    HGB 17.4 01/24/2018    HCT 49.4 01/24/2018    MCV 91.3 01/24/2018    MCH 32.2 CVR 81 BPM    ekg 8/17/21  Atrial fibrillation  -with ectopic ventricular couplets rate 98  -Right axis -consider right ventricular hypertrophy. ABNORMAL       Assessment    Home /70 124/80  HR in the 60's    Elevated TSH from amiod  Off amiod for over 6 month now and TSH coming down     Diagnosis Orders   1. Nonischemic dilated cardiomyopathy (HCC)  EKG 12 lead   2. Chronic systolic congestive heart failure (HCC)  EKG 12 lead   3. Permanent atrial fibrillation (HCC)  EKG 12 lead   4. Essential hypertension     5. NSVT (nonsustained ventricular tachycardia) ([facility])     6. S/P cardiac cath 12/20/2017- Angiographically Patent Coronaries, edp 11 mmhg, ef 25%- med RX             Recent admission DX     Episodes of NSVT 13 beat and 160 bpm longest overnight  S/p ATTEMPTED cARDIOVERSION  Leg edema +1 better  1.  Acute systolic congestive heart failure exacerbation with leg edema,  elevated BNP, chest x-ray abnormality, and his BNP is 2274. 2.  Severe cardiomyopathy. PROBABLY TACHY MEDIATED  Ejection fraction 25% to 30%, that is also  newly diagnosed. 3.  Atrial fibrillation with rapid ventricular response newly diagnosed,  not felt much by the patient.  Rate was 201.  Bilateral leg edema of +2.  4.  Elevated BNP more than 2000. 5.  Mild obesity.        Plan   The   Most current meds and labs reviewed    Patient Seen, Chart, Consults notes, Labs, Radiology studies reviewed. Continue the current treatment and with constant vigilance to changes in symptoms and also any potential side effects. Return for care or seek medical attention immediately if symptoms got worse and/or develop new symptoms.     Congestive heart failure: no evidence of fluid overload today, no recent hospitalization for CHF  CONT GENTLE DIURESIS oRAL  Cont  PO BUMEX  0.25 AND ALDACTONE 12.5 po qd  Cont  aldactone to 12.5 po qd      Hx of  Cardioversion and now back to atr fib and then had 2nd CV- had been in NSR for a while 18 months and now in atr fib   Now rate controlled  Permanent ATR FIB CVR- want med RX  Cont  toprol xl to 100 bid   cont  apixaban 5 bid  Increase  calan  po qd for better rate control  Decrease lisinopril to 2.5 po qd  Cont    digoxin  0.125 mg po qd    Cont  apixaban 5 po bid started dec 21, 2017    Cardiomyopathy- NICM  Cardiomyopathy: improving, no CHF symptoms, no change in clinical condition. Will need periodic echocardiograms depending on symptoms.   cmp-TOPROL XL AND LISINOPRIL  EF improved to 45%  No need for iCD  He verbalized understanding the risk  Cont    lisinopril 10 mg po qd  Cont toprol  bid    Hypertension, on medical treatment. Seems to be under good control. Patient is compliant with medical treatment. Patient Seen, Chart, Consults notes, Labs, Radiology studies reviewed. Discussed use, benefit, and side effects of prescribed medications. All patient questions answered. Pt voiced understanding. Instructed to continue current medications, diet and exercise. Continue risk factor modification and medical management. Patient agreed with treatment plan. Follow up as directed. Hx of abn Labs   Hx of tsh with reflex- abn dec 2019  TSH 10.7 and FT 4 1.09, Repeat TSH 9.3-Hence off  amiodarone  Repeat TSH  And FT4 I June 2020 WNL  PFT- WNL    Discussed use, benefit, and side effects of prescribed medications. All patient questions answered. Pt voiced understanding. Instructed to continue current medications, diet and exercise. Continue risk factor modification and medical management. Patient agreed with treatment plan. Follow up as directed.     F/U cardiology in 5 months      [facility]

## 2022-03-31 ENCOUNTER — OFFICE VISIT (OUTPATIENT)
Dept: FAMILY MEDICINE CLINIC | Age: 62
End: 2022-03-31
Payer: COMMERCIAL

## 2022-03-31 VITALS
OXYGEN SATURATION: 98 % | HEART RATE: 86 BPM | RESPIRATION RATE: 18 BRPM | SYSTOLIC BLOOD PRESSURE: 124 MMHG | HEIGHT: 70 IN | DIASTOLIC BLOOD PRESSURE: 76 MMHG | TEMPERATURE: 98.1 F | WEIGHT: 214 LBS | BODY MASS INDEX: 30.64 KG/M2

## 2022-03-31 DIAGNOSIS — J40 BRONCHITIS: Primary | ICD-10-CM

## 2022-03-31 DIAGNOSIS — H61.21 IMPACTED CERUMEN OF RIGHT EAR: ICD-10-CM

## 2022-03-31 PROCEDURE — 69210 REMOVE IMPACTED EAR WAX UNI: CPT | Performed by: NURSE PRACTITIONER

## 2022-03-31 PROCEDURE — 96372 THER/PROPH/DIAG INJ SC/IM: CPT | Performed by: NURSE PRACTITIONER

## 2022-03-31 PROCEDURE — 99213 OFFICE O/P EST LOW 20 MIN: CPT | Performed by: NURSE PRACTITIONER

## 2022-03-31 RX ORDER — DOXYCYCLINE HYCLATE 100 MG
100 TABLET ORAL 2 TIMES DAILY
Qty: 20 TABLET | Refills: 0 | Status: SHIPPED | OUTPATIENT
Start: 2022-03-31 | End: 2022-04-10

## 2022-03-31 RX ORDER — TRIAMCINOLONE ACETONIDE 40 MG/ML
80 INJECTION, SUSPENSION INTRA-ARTICULAR; INTRAMUSCULAR ONCE
Status: COMPLETED | OUTPATIENT
Start: 2022-03-31 | End: 2022-03-31

## 2022-03-31 RX ORDER — ALBUTEROL SULFATE 90 UG/1
2 AEROSOL, METERED RESPIRATORY (INHALATION) EVERY 4 HOURS PRN
Qty: 18 G | Refills: 0 | Status: SHIPPED | OUTPATIENT
Start: 2022-03-31

## 2022-03-31 RX ADMIN — TRIAMCINOLONE ACETONIDE 80 MG: 40 INJECTION, SUSPENSION INTRA-ARTICULAR; INTRAMUSCULAR at 15:19

## 2022-03-31 SDOH — ECONOMIC STABILITY: FOOD INSECURITY: WITHIN THE PAST 12 MONTHS, THE FOOD YOU BOUGHT JUST DIDN'T LAST AND YOU DIDN'T HAVE MONEY TO GET MORE.: NEVER TRUE

## 2022-03-31 SDOH — ECONOMIC STABILITY: FOOD INSECURITY: WITHIN THE PAST 12 MONTHS, YOU WORRIED THAT YOUR FOOD WOULD RUN OUT BEFORE YOU GOT MONEY TO BUY MORE.: NEVER TRUE

## 2022-03-31 ASSESSMENT — PATIENT HEALTH QUESTIONNAIRE - PHQ9
SUM OF ALL RESPONSES TO PHQ QUESTIONS 1-9: 0
SUM OF ALL RESPONSES TO PHQ QUESTIONS 1-9: 0
1. LITTLE INTEREST OR PLEASURE IN DOING THINGS: 0
SUM OF ALL RESPONSES TO PHQ QUESTIONS 1-9: 0
2. FEELING DOWN, DEPRESSED OR HOPELESS: 0
SUM OF ALL RESPONSES TO PHQ9 QUESTIONS 1 & 2: 0
SUM OF ALL RESPONSES TO PHQ QUESTIONS 1-9: 0

## 2022-03-31 ASSESSMENT — SOCIAL DETERMINANTS OF HEALTH (SDOH): HOW HARD IS IT FOR YOU TO PAY FOR THE VERY BASICS LIKE FOOD, HOUSING, MEDICAL CARE, AND HEATING?: NOT HARD AT ALL

## 2022-03-31 NOTE — PROGRESS NOTES
Administrations This Visit     triamcinolone acetonide (KENALOG-40) injection 80 mg     Admin Date  03/31/2022  15:19 Action  Given Dose  80 mg Route  IntraMUSCular Site  Vastus Lateralis Left Administered By  Ethel Solo LPN    Ordering Provider: SON Keyes CNP    NDC: 4826-8838-94    Lot#: [id number]    : [facility] [employer] [place] (PRIMARY CARE)    Patient Supplied?: No                Patient instructed to report any adverse reaction to me immediately.

## 2022-04-05 ASSESSMENT — ENCOUNTER SYMPTOMS
WHEEZING: 1
ABDOMINAL DISTENTION: 0
VOMITING: 0
SORE THROAT: 0
ABDOMINAL PAIN: 0
SINUS PRESSURE: 0
SHORTNESS OF BREATH: 1
CONSTIPATION: 0
COLOR CHANGE: 0
STRIDOR: 0
COUGH: 1
CHEST TIGHTNESS: 0
BACK PAIN: 0
DIARRHEA: 0
NAUSEA: 0

## 2022-06-29 RX ORDER — SPIRONOLACTONE 25 MG/1
12.5 TABLET ORAL DAILY
Qty: 45 TABLET | Refills: 3 | Status: SHIPPED | OUTPATIENT
Start: 2022-06-29 | End: 2022-09-27 | Stop reason: SDUPTHER

## 2022-09-14 RX ORDER — LISINOPRIL 2.5 MG/1
TABLET ORAL
Qty: 30 TABLET | Refills: 0 | Status: SHIPPED | OUTPATIENT
Start: 2022-09-14 | End: 2022-09-27 | Stop reason: SDUPTHER

## 2022-09-14 RX ORDER — DIGOXIN 125 MCG
TABLET ORAL
Qty: 30 TABLET | Refills: 0 | Status: SHIPPED | OUTPATIENT
Start: 2022-09-14 | End: 2022-09-27 | Stop reason: SDUPTHER

## 2022-09-27 ENCOUNTER — OFFICE VISIT (OUTPATIENT)
Dept: CARDIOLOGY CLINIC | Age: 62
End: 2022-09-27
Payer: COMMERCIAL

## 2022-09-27 VITALS
DIASTOLIC BLOOD PRESSURE: 83 MMHG | HEART RATE: 87 BPM | BODY MASS INDEX: 30.49 KG/M2 | SYSTOLIC BLOOD PRESSURE: 121 MMHG | HEIGHT: 70 IN | WEIGHT: 213 LBS

## 2022-09-27 DIAGNOSIS — I42.0 NONISCHEMIC DILATED CARDIOMYOPATHY (HCC): Primary | ICD-10-CM

## 2022-09-27 DIAGNOSIS — I47.29 NSVT (NONSUSTAINED VENTRICULAR TACHYCARDIA): ICD-10-CM

## 2022-09-27 DIAGNOSIS — Z98.890 S/P CARDIAC CATH: ICD-10-CM

## 2022-09-27 DIAGNOSIS — I48.21 PERMANENT ATRIAL FIBRILLATION (HCC): ICD-10-CM

## 2022-09-27 DIAGNOSIS — I50.42 CHRONIC COMBINED SYSTOLIC AND DIASTOLIC CONGESTIVE HEART FAILURE (HCC): ICD-10-CM

## 2022-09-27 DIAGNOSIS — Z91.199 NON-COMPLIANCE: ICD-10-CM

## 2022-09-27 PROCEDURE — 93000 ELECTROCARDIOGRAM COMPLETE: CPT | Performed by: INTERNAL MEDICINE

## 2022-09-27 PROCEDURE — 99214 OFFICE O/P EST MOD 30 MIN: CPT | Performed by: INTERNAL MEDICINE

## 2022-09-27 RX ORDER — LISINOPRIL 2.5 MG/1
TABLET ORAL
Qty: 90 TABLET | Refills: 3 | Status: SHIPPED | OUTPATIENT
Start: 2022-09-27

## 2022-09-27 RX ORDER — METOPROLOL SUCCINATE 100 MG/1
100 TABLET, EXTENDED RELEASE ORAL 2 TIMES DAILY
Qty: 180 TABLET | Refills: 3 | Status: SHIPPED | OUTPATIENT
Start: 2022-09-27

## 2022-09-27 RX ORDER — DIGOXIN 125 MCG
TABLET ORAL
Qty: 90 TABLET | Refills: 3 | Status: SHIPPED | OUTPATIENT
Start: 2022-09-27

## 2022-09-27 RX ORDER — BUMETANIDE 0.5 MG/1
TABLET ORAL
Qty: 45 TABLET | Refills: 3 | Status: SHIPPED | OUTPATIENT
Start: 2022-09-27

## 2022-09-27 RX ORDER — SPIRONOLACTONE 25 MG/1
12.5 TABLET ORAL DAILY
Qty: 45 TABLET | Refills: 3 | Status: SHIPPED | OUTPATIENT
Start: 2022-09-27

## 2022-09-27 RX ORDER — FOLIC ACID 1 MG/1
1 TABLET ORAL DAILY
Qty: 90 TABLET | Refills: 3 | Status: SHIPPED | OUTPATIENT
Start: 2022-09-27

## 2022-09-27 NOTE — PROGRESS NOTES
Chief Complaint   Patient presents with    6 Month Follow-Up   Originally Patient here for hospital follow-up. REASON FOR CONSULTATION:  Evaluation for atrial fibrillation with rapid  ventricular response and cardiomyopathy with ejection fraction 20% to 25%. PRESENTED WITH LEG EDEMA AND SOB  hX of HAVEN cardioversion. Hx of  atr fib with CVR then back in to NSR      PT IS HERE FOR A 13 MONTH FOLLOW UP. CHF, CMP and atr fib    EKG done today    No wt gain    Denied chest pain, sob, palpitations,dizziness or edema    No cp    No sob    No leg edema and all resolved    No N/v/d    Doing well       Patient Active Problem List   Diagnosis    Encounter for cardioversion procedure    Leukocytosis    Polycythemia    Obesity (BMI 30-39. 9)    Alcohol abuse    NSVT (nonsustained ventricular tachycardia) (HCC)    Anxiety about health    Irritability    Uncomplicated alcohol withdrawal without perceptual disturbances (HCC)    Chronic systolic congestive heart failure (HCC)    S/P cardiac cath 12/20/2017- Angiographically Patent Coronaries, edp 11 mmhg, ef 25%- med RX    Nonischemic dilated cardiomyopathy (HCC) EF improved  EF 25 to 45%    Essential hypertension    Permanent atrial fibrillation (HCC)    Chronic combined systolic and diastolic congestive heart failure (HCC)    Non-compliance with f/u advised       Past Surgical History:   Procedure Laterality Date    CHOLECYSTECTOMY         Allergies   Allergen Reactions    Augmentin [Amoxicillin-Pot Clavulanate] Shortness Of Breath and Swelling        Family History   Problem Relation Age of Onset    Cancer Father         Social History     Socioeconomic History    Marital status: Single     Spouse name: Not on file    Number of children: Not on file    Years of education: Not on file    Highest education level: Not on file   Occupational History    Not on file   Tobacco Use    Smoking status: Never    Smokeless tobacco: Former     Types: Snuff     Quit date: 2019    Tobacco comments:     occasional use   Vaping Use    Vaping Use: Never used   Substance and Sexual Activity    Alcohol use: Yes     Alcohol/week: 3.0 standard drinks     Types: 3 Cans of beer per week     Comment: 3 beers a day    Drug use: No    Sexual activity: Yes     Partners: Female   Other Topics Concern    Not on file   Social History Narrative    Not on file     Social Determinants of Health     Financial Resource Strain: Low Risk     Difficulty of Paying Living Expenses: Not hard at all   Food Insecurity: No Food Insecurity    Worried About Running Out of Food in the Last Year: Never true    Ran Out of Food in the Last Year: Never true   Transportation Needs: Not on file   Physical Activity: Not on file   Stress: Not on file   Social Connections: Not on file   Intimate Partner Violence: Not on file   Housing Stability: Not on file       Current Outpatient Medications   Medication Sig Dispense Refill    apixaban (ELIQUIS) 5 MG TABS tablet TAKE ONE (1) TABLET BY MOUTH TWO (2) TIMES A DAY. 180 tablet 3    bumetanide (BUMEX) 0.5 MG tablet TAKE ONE-HALF (1/2) TABLET BY MOUTH ONCE DAILY. 45 tablet 3    digoxin (LANOXIN) 125 MCG tablet Take 1 tablet once daily 90 tablet 3    folic acid (FOLVITE) 1 MG tablet Take 1 tablet by mouth daily 90 tablet 3    lisinopril (PRINIVIL;ZESTRIL) 2.5 MG tablet TAKE ONE (1) TABLET BY MOUTH ONCE DAILY. 90 tablet 3    metoprolol succinate (TOPROL XL) 100 MG extended release tablet Take 1 tablet by mouth 2 times daily 180 tablet 3    spironolactone (ALDACTONE) 25 MG tablet Take 0.5 tablets by mouth daily 45 tablet 3    verapamil (CALAN SR) 180 MG extended release tablet TAKE ONE (1) TABLET BY MOUTH ONCE DAILY AT BEDTIME.  90 tablet 3    albuterol sulfate HFA (VENTOLIN HFA) 108 (90 Base) MCG/ACT inhaler Inhale 2 puffs into the lungs every 4 hours as needed for Wheezing or Shortness of Breath 18 g 0    vitamin B-1 100 MG tablet Take 1 tablet by mouth daily 30 tablet 3    Multiple Vitamin (MULTIVITAMIN) tablet Take 1 tablet by mouth daily 30 tablet 1     No current facility-administered medications for this visit. Review of Systems -     General ROS: negative  Psychological ROS: negative  Hematological and Lymphatic ROS: No history of blood clots or bleeding disorder. Respiratory ROS: no cough, shortness of breath, or wheezing  Cardiovascular ROS: no chest pain or dyspnea on exertion  Gastrointestinal ROS: negative  Genito-Urinary ROS: no dysuria, trouble voiding, or hematuria  Musculoskeletal ROS: negative  Neurological ROS: no TIA or stroke symptoms  Dermatological ROS: negative      Blood pressure 121/83, pulse 87, height 5' 10\" (1.778 m), weight 213 lb (96.6 kg). Physical Examination:    General appearance - alert, well appearing, and in no distress  Mental status - alert, oriented to person, place, and time  Neck - supple, no significant adenopathy, no JVD, or carotid bruits  Chest - clear to auscultation, no wheezes, rales or rhonchi, symmetric air entry  Heart - normal rate, regular rhythm, normal S1, S2, no murmurs, rubs, clicks or gallops  Abdomen - soft, nontender, nondistended, no masses or organomegaly  Neurological - alert, oriented, normal speech, no focal findings or movement disorder noted  Musculoskeletal - no joint tenderness, deformity or swelling  Extremities - peripheral pulses normal, no pedal edema, no clubbing or cyanosis  Skin - normal coloration and turgor, no rashes, no suspicious skin lesions noted    Lab  No results for input(s): CKTOTAL, CKMB, CKMBINDEX, TROPONINI in the last 72 hours.   CBC:   Lab Results   Component Value Date/Time    WBC 7.6 01/24/2018 08:41 AM    RBC 5.41 01/24/2018 08:41 AM    HGB 17.4 01/24/2018 08:41 AM    HCT 49.4 01/24/2018 08:41 AM    MCV 91.3 01/24/2018 08:41 AM    MCH 32.2 01/24/2018 08:41 AM    MCHC 35.3 01/24/2018 08:41 AM    RDW 13.9 01/24/2018 08:41 AM     01/24/2018 08:41 AM    MPV 8.4 01/24/2018 08:41 AM     BMP: Lab Results   Component Value Date/Time     08/11/2021 12:49 PM    K 4.2 08/11/2021 12:49 PM    K 4.0 01/24/2018 08:41 AM     08/11/2021 12:49 PM    CO2 25 08/11/2021 12:49 PM    BUN 14 08/11/2021 12:49 PM    LABALBU 4.7 06/29/2019 12:45 PM    CREATININE 0.93 08/11/2021 12:49 PM    CALCIUM 8.50 08/11/2021 12:49 PM    LABGLOM 60 06/30/2020 12:00 AM    LABGLOM 68 12/30/2019 01:35 PM    GLUCOSE 116 08/11/2021 12:49 PM     Hepatic Function Panel:    Lab Results   Component Value Date/Time    ALKPHOS 52 06/29/2019 12:45 PM    ALT 28 06/29/2019 12:45 PM    AST 23 06/29/2019 12:45 PM    PROT 7.4 06/29/2019 12:45 PM    BILITOT 1.1 06/29/2019 12:45 PM    BILIDIR <0.2 06/29/2019 12:45 PM    LABALBU 4.7 06/29/2019 12:45 PM     Magnesium:    Lab Results   Component Value Date/Time    MG 2.0 08/11/2021 12:49 PM     Warfarin PT/INR:  No components found for: PTPATWAR, PTINRWAR  HgBA1c:  No results found for: LABA1C  FLP:    Lab Results   Component Value Date/Time    TRIG 129 06/29/2019 12:45 PM    HDL 67 06/29/2019 12:45 PM    LDLCALC 128 06/29/2019 12:45 PM     TSH:    Lab Results   Component Value Date/Time    TSH 4.45 06/30/2020 12:00 AM         EKG 2/22/18  Sinus  Rhythm   Rate 67  WITHIN NORMAL LIMITS    EKG 8/2/18  NSR, NO acute abn    Conclusions      Summary   Left ventricle size is normal.   Normal left ventricular wall thickness. There was mild global hypokinesis of the left ventricle. Systolic function was mildly reduced. Ejection fraction is visually estimated at 45%. The left atrium is Moderately dilated. When this echo is compared with the echo from dec 11, 2017, significant   interval improvement noted with EF from 25 % to 45%.       Signature      ----------------------------------------------------------------   Electronically signed by Soren Hensley MD (Interpreting   physician) on 04/05/2018 at 07:27 PM    EKG 1/3/19  NSR, no acute abn    EKG 7/2/19  Sinus  Rhythm  HR 85  WITHIN NORMAL overload today, no recent hospitalization for CHF  CONT GENTLE DIURESIS oRAL  Cont  PO BUMEX  0.25 mg po qd   Cont  aldactone to 12.5 po qd      Hx of  Cardioversion and now back to atr fib and then had 2nd CV- had been in NSR for a while 18 months and now in atr fib   Now rate controlled  Permanent ATR FIB CVR- want med RX  Cont  toprol xl to 100 bid   cont  apixaban 5 bid  Cont   calan  po qd  Cont lisinopril to 2.5 po qd  Cont    digoxin  0.125 mg po qd    Cont  apixaban 5 po bid started dec 21, 2017    Cardiomyopathy- NICM  Cardiomyopathy: improving, no CHF symptoms, no change in clinical condition. Will need periodic echocardiograms depending on symptoms. cmp-TOPROL XL AND LISINOPRIL  EF improved to 45%  No need for iCD  He verbalized understanding the risk  Cont    lisinopril 2.5  Cont toprol  bid  Need f/u echo    Hypertension, on medical treatment. Seems to be under good control. Patient is compliant with medical treatment. Patient Seen, Chart, Consults notes, Labs, Radiology studies reviewed. Discussed use, benefit, and side effects of prescribed medications. All patient questions answered. Pt voiced understanding. Instructed to continue current medications, diet and exercise. Continue risk factor modification and medical management. Patient agreed with treatment plan. Follow up as directed. Hx of abn Labs   Hx of tsh with reflex- abn dec 2019  TSH 10.7 and FT 4 1.09, Repeat TSH 9.3-Hence off  amiodarone  Repeat TSH  And FT4 I June 2020 WNL  PFT- WNL    Overall stable and doing well    Advised ccompliance    Discussed use, benefit, and side effects of prescribed medications. All patient questions answered. Pt voiced understanding. Instructed to continue current medications, diet and exercise. Continue risk factor modification and medical management. Patient agreed with treatment plan. Follow up as directed.     RTC in 6 months      Ivory Quinones, [facility]

## 2022-11-02 ENCOUNTER — TELEPHONE (OUTPATIENT)
Dept: CARDIOLOGY CLINIC | Age: 62
End: 2022-11-02

## 2022-11-02 NOTE — TELEPHONE ENCOUNTER
Pt currently on the schedule for an ECHO tomorrow 11/3. The current insurance on file has e-rejected. I've mad 2 attempts to the pt to update the insurance with no success.  The ECHO will need r/s to a later date or canceled until we can obtain the auth

## 2023-02-13 NOTE — PATIENT INSTRUCTIONS
You may receive a survey regarding the care you received during your visit. Your input is valuable to us. We encourage you to complete and return your survey. We hope you will choose us in the future for your healthcare needs.
No

## 2023-04-07 ENCOUNTER — TELEPHONE (OUTPATIENT)
Dept: CARDIOLOGY CLINIC | Age: 63
End: 2023-04-07

## 2023-04-07 NOTE — TELEPHONE ENCOUNTER
Patient has an upcoming echo scheduled at [place] [address] on 4/10/23. His insurance is not valid and after checking the [employer] system to determine if other insurance was found we did not find any. We have exhausted all efforts to begin the authorization process. Please attempt to contact patient to update insurance information. At this time, the referral will be marked as \"self pay. \" Placing a note in the referral that it is self pay, triggers an alert for Registration to attempt to reach the patient.

## 2023-04-28 ENCOUNTER — HOSPITAL ENCOUNTER (OUTPATIENT)
Dept: NON INVASIVE DIAGNOSTICS | Age: 63
Discharge: HOME | End: 2023-04-28
Payer: COMMERCIAL

## 2023-04-28 DIAGNOSIS — I42.0 NONISCHEMIC DILATED CARDIOMYOPATHY (HCC): ICD-10-CM

## 2023-04-28 LAB
LV EF: 55 %
LVEF MODALITY: NORMAL

## 2023-04-28 PROCEDURE — 93306 TTE W/DOPPLER COMPLETE: CPT

## 2023-05-24 NOTE — PROGRESS NOTES
Sebastian Henson (:  1960) is a 60 y.o. male,Established patient, here for evaluation of the following chief complaint(s):  Cough         ASSESSMENT/PLAN:  1. Bronchitis  -     triamcinolone acetonide (KENALOG-40) injection 80 mg; 80 mg, IntraMUSCular, ONCE, 1 dose, On Thu 3/31/22 at 1530  2. Impacted cerumen of right ear  -     32280 - ND REMOVE IMPACTED EAR WAX      Cerumen removed from right ear with elephant ear wax removal device and curette. Normal TM. Kenalog injection given   Fluids  Rest  meds as prescribed  Albuterol for wheezing    Return if symptoms worsen or fail to improve. Subjective   SUBJECTIVE/OBJECTIVE:  HPI    Cough started 7-8 days ago. Cough is persistent. No fever or chills. No sob. Has been wheezing. No smoker. Does weld at work. Review of Systems   Constitutional: Negative for activity change, appetite change, chills, diaphoresis, fatigue, fever and unexpected weight change. HENT: Negative for congestion, ear pain, postnasal drip, sinus pressure and sore throat. Eyes: Negative for visual disturbance. Respiratory: Positive for cough, shortness of breath and wheezing. Negative for chest tightness and stridor. Cardiovascular: Negative for chest pain, palpitations and leg swelling. Gastrointestinal: Negative for abdominal distention, abdominal pain, constipation, diarrhea, nausea and vomiting. Endocrine: Negative for polydipsia, polyphagia and polyuria. Genitourinary: Negative for decreased urine volume, difficulty urinating, dysuria, flank pain, frequency, hematuria and urgency. Musculoskeletal: Negative for arthralgias, back pain, gait problem, joint swelling, myalgias and neck pain. Skin: Negative for color change, pallor and rash. Neurological: Negative for dizziness, syncope, weakness, light-headedness, numbness and headaches. Hematological: Negative for adenopathy.    Psychiatric/Behavioral: Negative for behavioral problems, self-injury and sleep disturbance. The patient is not nervous/anxious. Objective   Physical Exam  Vitals reviewed. Constitutional:       General: He is not in acute distress. Appearance: Normal appearance. He is well-developed. HENT:      Head: Normocephalic. Right Ear: External ear normal. There is impacted cerumen. Left Ear: External ear normal. There is no impacted cerumen. Nose: Nose normal. No congestion. Right Sinus: No maxillary sinus tenderness. Left Sinus: No maxillary sinus tenderness. Mouth/Throat:      Pharynx: Uvula midline. Neck:      Trachea: Trachea normal.   Cardiovascular:      Rate and Rhythm: Normal rate and regular rhythm. Heart sounds: Normal heart sounds. No murmur heard. Pulmonary:      Effort: Pulmonary effort is normal. No respiratory distress. Breath sounds: Wheezing present. No decreased breath sounds, rhonchi or rales. Chest:      Chest wall: No tenderness. Abdominal:      General: There is no distension. Palpations: Abdomen is soft. There is no mass. Tenderness: There is no abdominal tenderness. Musculoskeletal:         General: No tenderness or deformity. Normal range of motion. Cervical back: Normal range of motion and neck supple. Lymphadenopathy:      Cervical: No cervical adenopathy. Skin:     General: Skin is warm and dry. Neurological:      Mental Status: He is alert and oriented to person, place, and time. Motor: No abnormal muscle tone. Coordination: Coordination normal.      Gait: Gait normal.   Psychiatric:         Mood and Affect: Mood normal.         Behavior: Behavior normal.         Thought Content:  Thought content normal.         Judgment: Judgment normal.            On this date 3/31/2022 I have spent 25 minutes reviewing previous notes, test results and face to face with the patient discussing the diagnosis and importance of compliance with the treatment plan as well as documenting on the day of the visit. An electronic signature was used to authenticate this note.     --Arvind Jimenez, APRJUDSON - CNP Alert and oriented to person, place and time

## 2023-07-14 RX ORDER — SPIRONOLACTONE 25 MG/1
12.5 TABLET ORAL DAILY
Qty: 45 TABLET | Refills: 0 | Status: SHIPPED | OUTPATIENT
Start: 2023-07-14

## 2023-07-14 RX ORDER — SPIRONOLACTONE 25 MG/1
TABLET ORAL
Qty: 15 TABLET | Refills: 0 | OUTPATIENT
Start: 2023-07-14

## 2023-07-17 ENCOUNTER — TELEPHONE (OUTPATIENT)
Dept: CARDIOLOGY CLINIC | Age: 63
End: 2023-07-17

## 2023-07-17 NOTE — TELEPHONE ENCOUNTER
MOISES TRISTAN Key: [id number] - PA Case ID: [id number] - Rx #: [id number] help? Call us at [phone]  Outcome  Approvedtoday  CaseId:[id number];Status:Approved; Review Type:Prior Auth; Coverage Start Date:07/01/2023; Coverage End Date:07/30/2024;  Drug  Eliquis 5MG tablets  Form  [employer] Electronic PA Form (2017 NCPDP)  Original Claim Info  76

## 2023-08-11 ENCOUNTER — OFFICE VISIT (OUTPATIENT)
Dept: CARDIOLOGY CLINIC | Age: 63
End: 2023-08-11
Payer: COMMERCIAL

## 2023-08-11 VITALS
HEIGHT: 70 IN | SYSTOLIC BLOOD PRESSURE: 137 MMHG | HEART RATE: 74 BPM | WEIGHT: 206 LBS | BODY MASS INDEX: 29.49 KG/M2 | DIASTOLIC BLOOD PRESSURE: 86 MMHG

## 2023-08-11 DIAGNOSIS — I42.0 NONISCHEMIC DILATED CARDIOMYOPATHY (HCC): ICD-10-CM

## 2023-08-11 DIAGNOSIS — I47.29 NSVT (NONSUSTAINED VENTRICULAR TACHYCARDIA) (HCC): ICD-10-CM

## 2023-08-11 DIAGNOSIS — I48.21 PERMANENT ATRIAL FIBRILLATION (HCC): ICD-10-CM

## 2023-08-11 DIAGNOSIS — Z98.890 S/P CARDIAC CATH: ICD-10-CM

## 2023-08-11 DIAGNOSIS — I50.42 CHRONIC COMBINED SYSTOLIC AND DIASTOLIC CONGESTIVE HEART FAILURE (HCC): Primary | ICD-10-CM

## 2023-08-11 DIAGNOSIS — I10 ESSENTIAL HYPERTENSION: ICD-10-CM

## 2023-08-11 DIAGNOSIS — Z91.199 NONCOMPLIANCE: ICD-10-CM

## 2023-08-11 PROBLEM — I50.22 CHRONIC SYSTOLIC CONGESTIVE HEART FAILURE (HCC): Status: RESOLVED | Noted: 2017-12-19 | Resolved: 2023-08-11

## 2023-08-11 PROCEDURE — 1036F TOBACCO NON-USER: CPT | Performed by: INTERNAL MEDICINE

## 2023-08-11 PROCEDURE — 3074F SYST BP LT 130 MM HG: CPT | Performed by: INTERNAL MEDICINE

## 2023-08-11 PROCEDURE — G8427 DOCREV CUR MEDS BY ELIG CLIN: HCPCS | Performed by: INTERNAL MEDICINE

## 2023-08-11 PROCEDURE — G8417 CALC BMI ABV UP PARAM F/U: HCPCS | Performed by: INTERNAL MEDICINE

## 2023-08-11 PROCEDURE — 3078F DIAST BP <80 MM HG: CPT | Performed by: INTERNAL MEDICINE

## 2023-08-11 PROCEDURE — 3017F COLORECTAL CA SCREEN DOC REV: CPT | Performed by: INTERNAL MEDICINE

## 2023-08-11 PROCEDURE — 99214 OFFICE O/P EST MOD 30 MIN: CPT | Performed by: INTERNAL MEDICINE

## 2023-08-11 NOTE — PROGRESS NOTES
Chief Complaint   Patient presents with    [facility]   Originally Patient here for hospital follow-up. REASON FOR CONSULTATION:  Evaluation for atrial fibrillation with rapid  ventricular response and cardiomyopathy with ejection fraction 20% to 25%. PRESENTED WITH LEG EDEMA AND SOB  hX of HAVEN cardioversion. Hx of  atr fib with CVR then back in to NSR  Hx of . CHF, CMP and atr fib          Pt here for a 6 month f/u came after 10 months    EKG done 9-27-22    No wt gain    Denied chest pain, sob, palpitations,dizziness or edema    No cp    No sob    No leg edema and all resolved    No N/v/d    Doing well       Patient Active Problem List   Diagnosis    Encounter for cardioversion procedure    Leukocytosis    Polycythemia    Obesity (BMI 30-39. 9)    Alcohol abuse    NSVT (nonsustained ventricular tachycardia) (HCC)    Anxiety about health    Irritability    Uncomplicated alcohol withdrawal without perceptual disturbances (HCC)    S/P cardiac cath 12/20/2017- Angiographically Patent Coronaries, edp 11 mmhg, ef 25%- med RX    Nonischemic dilated cardiomyopathy (HCC) EF improved  EF 25 to 45%    Essential hypertension    Permanent atrial fibrillation (HCC)    Chronic combined systolic and diastolic congestive heart failure (HCC)    Non-compliance with f/u advised    Noncompliance       Past Surgical History:   Procedure Laterality Date    CHOLECYSTECTOMY         Allergies   Allergen Reactions    Augmentin [Amoxicillin-Pot Clavulanate] Shortness Of Breath and Swelling        Family History   Problem Relation Age of Onset    Cancer Father         Social History     Socioeconomic History    Marital status: Single     Spouse name: Not on file    Number of children: Not on file    Years of education: Not on file    Highest education level: Not on file   Occupational History    Not on file   Tobacco Use    Smoking status: Never    Smokeless tobacco: Former     Types: Snuff     Quit

## 2023-08-14 RX ORDER — FOLIC ACID 1 MG/1
1 TABLET ORAL DAILY
Qty: 90 TABLET | Refills: 3 | Status: SHIPPED | OUTPATIENT
Start: 2023-08-14

## 2023-08-14 RX ORDER — SPIRONOLACTONE 25 MG/1
12.5 TABLET ORAL DAILY
Qty: 45 TABLET | Refills: 2 | Status: SHIPPED | OUTPATIENT
Start: 2023-08-14

## 2023-08-14 RX ORDER — BUMETANIDE 0.5 MG/1
TABLET ORAL
Qty: 45 TABLET | Refills: 2 | Status: SHIPPED | OUTPATIENT
Start: 2023-08-14

## 2023-08-14 RX ORDER — DIGOXIN 125 MCG
TABLET ORAL
Qty: 90 TABLET | Refills: 2 | Status: SHIPPED | OUTPATIENT
Start: 2023-08-14

## 2023-08-14 RX ORDER — METOPROLOL SUCCINATE 100 MG/1
100 TABLET, EXTENDED RELEASE ORAL 2 TIMES DAILY
Qty: 180 TABLET | Refills: 2 | Status: SHIPPED | OUTPATIENT
Start: 2023-08-14

## 2023-08-14 RX ORDER — LISINOPRIL 2.5 MG/1
TABLET ORAL
Qty: 90 TABLET | Refills: 2 | Status: SHIPPED | OUTPATIENT
Start: 2023-08-14

## 2023-08-15 NOTE — TELEPHONE ENCOUNTER
Verbal Order from Dr. Calles Kins patient to see if he had his labs done yet. Scripts were  signed yesterday and labs haven't been done yet. Per Dr. Danny Mendez if unable to get [facility] of patient we need to cancel signed scripts  I called patient but [facility], no VM. Per Dr. Ozzie Ruiz to cancel Spironolactone, Bumex and Digoxin until lab work is completed. I called [facility] [place] and talked to [employer] who is aware to not give patient a refill on his Bumex, Digoxin or Spironolactone. Attempted to call [facility] but [facility]. Once patient gets labs drawn which are in Epic we need to send to Dr. Danny Mendez to see if we are able to refill Bumex, Digoxin and Spironolactone. Please agree Dr. Danny Mendez.

## 2023-08-15 NOTE — TELEPHONE ENCOUNTER
Jeanette Hilario, in scheduling, spoke to patient. He states he will have labs drawn at 8 am.  He is aware of the scripts being cancelled.

## 2023-08-16 ENCOUNTER — TELEPHONE (OUTPATIENT)
Dept: FAMILY MEDICINE CLINIC | Age: 63
End: 2023-08-16

## 2023-08-16 ENCOUNTER — NURSE ONLY (OUTPATIENT)
Dept: FAMILY MEDICINE CLINIC | Age: 63
End: 2023-08-16
Payer: COMMERCIAL

## 2023-08-16 DIAGNOSIS — Z98.890 S/P CARDIAC CATH: ICD-10-CM

## 2023-08-16 DIAGNOSIS — I47.29 NSVT (NONSUSTAINED VENTRICULAR TACHYCARDIA) (HCC): ICD-10-CM

## 2023-08-16 DIAGNOSIS — I47.29 PAROXYSMAL VENTRICULAR TACHYCARDIA (HCC): Primary | ICD-10-CM

## 2023-08-16 DIAGNOSIS — Z91.199 NON-COMPLIANCE: ICD-10-CM

## 2023-08-16 DIAGNOSIS — I50.42 CHRONIC COMBINED SYSTOLIC AND DIASTOLIC CONGESTIVE HEART FAILURE (HCC): ICD-10-CM

## 2023-08-16 DIAGNOSIS — I48.21 PERMANENT ATRIAL FIBRILLATION (HCC): ICD-10-CM

## 2023-08-16 DIAGNOSIS — I42.0 NONISCHEMIC DILATED CARDIOMYOPATHY (HCC): ICD-10-CM

## 2023-08-16 LAB
ANION GAP SERPL CALC-SCNC: 9 MEQ/L (ref 8–16)
BUN SERPL-MCNC: 7 MG/DL (ref 7–22)
CALCIUM SERPL-MCNC: 9.1 MG/DL (ref 8.5–10.5)
CHLORIDE SERPL-SCNC: 104 MEQ/L (ref 98–111)
CO2 SERPL-SCNC: 25 MEQ/L (ref 23–33)
CREAT SERPL-MCNC: 0.7 MG/DL (ref 0.4–1.2)
DIGOXIN SERPL-MCNC: 0.5 NG/ML (ref 0.5–2)
GFR SERPL CREATININE-BSD FRML MDRD: > 60 ML/MIN/1.73M2
GLUCOSE SERPL-MCNC: 100 MG/DL (ref 70–108)
MAGNESIUM SERPL-MCNC: 1.8 MG/DL (ref 1.6–2.4)
POTASSIUM SERPL-SCNC: 4.3 MEQ/L (ref 3.5–5.2)
SODIUM SERPL-SCNC: 138 MEQ/L (ref 135–145)
T4 FREE SERPL-MCNC: 0.97 NG/DL (ref 0.93–1.76)
TSH SERPL DL<=0.005 MIU/L-ACNC: 5.5 UIU/ML (ref 0.4–4.2)

## 2023-08-16 PROCEDURE — 36415 COLL VENOUS BLD VENIPUNCTURE: CPT | Performed by: NURSE PRACTITIONER

## 2023-08-16 NOTE — TELEPHONE ENCOUNTER
Aundrea Chong, CMA 18 minutes ago (8:57 AM)     CB  Patient wanted me to share with you guys that he came in and had his labs completed, they will get picked up by the  by 10 am today.

## 2023-08-16 NOTE — TELEPHONE ENCOUNTER
Patient wanted me to share with you guys that he came in and had his labs completed, they will get picked up by the  by 10 am today.

## 2023-08-23 RX ORDER — DIGOXIN 125 MCG
TABLET ORAL
Qty: 90 TABLET | Refills: 3 | Status: SHIPPED | OUTPATIENT
Start: 2023-08-23

## 2023-08-23 RX ORDER — FOLIC ACID 1 MG/1
1 TABLET ORAL DAILY
Qty: 90 TABLET | Refills: 3 | Status: SHIPPED | OUTPATIENT
Start: 2023-08-23

## 2023-08-23 RX ORDER — LISINOPRIL 2.5 MG/1
TABLET ORAL
Qty: 90 TABLET | Refills: 3 | Status: SHIPPED | OUTPATIENT
Start: 2023-08-23

## 2023-08-23 RX ORDER — BUMETANIDE 0.5 MG/1
TABLET ORAL
Qty: 45 TABLET | Refills: 3 | Status: SHIPPED | OUTPATIENT
Start: 2023-08-23

## 2023-08-23 RX ORDER — SPIRONOLACTONE 25 MG/1
12.5 TABLET ORAL DAILY
Qty: 45 TABLET | Refills: 3 | Status: SHIPPED | OUTPATIENT
Start: 2023-08-23

## 2023-08-23 RX ORDER — METOPROLOL SUCCINATE 100 MG/1
100 TABLET, EXTENDED RELEASE ORAL 2 TIMES DAILY
Qty: 180 TABLET | Refills: 3 | Status: SHIPPED | OUTPATIENT
Start: 2023-08-23

## 2024-03-11 ENCOUNTER — NURSE ONLY (OUTPATIENT)
Dept: LAB | Age: 64
End: 2024-03-11

## 2024-03-11 ENCOUNTER — TELEPHONE (OUTPATIENT)
Dept: CARDIOLOGY CLINIC | Age: 64
End: 2024-03-11

## 2024-03-11 DIAGNOSIS — I50.42 CHRONIC COMBINED SYSTOLIC AND DIASTOLIC CONGESTIVE HEART FAILURE (HCC): Primary | ICD-10-CM

## 2024-03-11 DIAGNOSIS — I47.29 NSVT (NONSUSTAINED VENTRICULAR TACHYCARDIA) (HCC): ICD-10-CM

## 2024-03-11 DIAGNOSIS — I10 ESSENTIAL HYPERTENSION: ICD-10-CM

## 2024-03-11 DIAGNOSIS — I42.0 NONISCHEMIC DILATED CARDIOMYOPATHY (HCC): ICD-10-CM

## 2024-03-11 DIAGNOSIS — I48.21 PERMANENT ATRIAL FIBRILLATION (HCC): ICD-10-CM

## 2024-03-13 ENCOUNTER — NURSE ONLY (OUTPATIENT)
Dept: LAB | Age: 64
End: 2024-03-13

## 2024-03-13 DIAGNOSIS — I10 ESSENTIAL HYPERTENSION: ICD-10-CM

## 2024-03-13 DIAGNOSIS — I48.21 PERMANENT ATRIAL FIBRILLATION (HCC): ICD-10-CM

## 2024-03-13 DIAGNOSIS — I42.0 NONISCHEMIC DILATED CARDIOMYOPATHY (HCC): ICD-10-CM

## 2024-03-13 DIAGNOSIS — I47.29 NSVT (NONSUSTAINED VENTRICULAR TACHYCARDIA) (HCC): ICD-10-CM

## 2024-03-13 DIAGNOSIS — I50.42 CHRONIC COMBINED SYSTOLIC AND DIASTOLIC CONGESTIVE HEART FAILURE (HCC): ICD-10-CM

## 2024-03-13 LAB
ALBUMIN SERPL BCG-MCNC: 4.5 G/DL (ref 3.5–5.1)
ALP SERPL-CCNC: 61 U/L (ref 38–126)
ALT SERPL W/O P-5'-P-CCNC: 34 U/L (ref 11–66)
ANION GAP SERPL CALC-SCNC: 11 MEQ/L (ref 8–16)
AST SERPL-CCNC: 30 U/L (ref 5–40)
BILIRUB CONJ SERPL-MCNC: < 0.2 MG/DL (ref 0–0.3)
BILIRUB SERPL-MCNC: 1 MG/DL (ref 0.3–1.2)
BUN SERPL-MCNC: 13 MG/DL (ref 7–22)
CALCIUM SERPL-MCNC: 9.8 MG/DL (ref 8.5–10.5)
CHLORIDE SERPL-SCNC: 103 MEQ/L (ref 98–111)
CHOLEST SERPL-MCNC: 217 MG/DL (ref 100–199)
CO2 SERPL-SCNC: 26 MEQ/L (ref 23–33)
CREAT SERPL-MCNC: 0.8 MG/DL (ref 0.4–1.2)
DIGOXIN SERPL-MCNC: 0.3 NG/ML (ref 0.5–2)
GFR SERPL CREATININE-BSD FRML MDRD: > 60 ML/MIN/1.73M2
GLUCOSE SERPL-MCNC: 99 MG/DL (ref 70–108)
HDLC SERPL-MCNC: 51 MG/DL
LDLC SERPL CALC-MCNC: 133 MG/DL
MAGNESIUM SERPL-MCNC: 2 MG/DL (ref 1.6–2.4)
POTASSIUM SERPL-SCNC: 4.4 MEQ/L (ref 3.5–5.2)
PROT SERPL-MCNC: 7.2 G/DL (ref 6.1–8)
SODIUM SERPL-SCNC: 140 MEQ/L (ref 135–145)
TRIGL SERPL-MCNC: 165 MG/DL (ref 0–199)

## 2024-03-15 ENCOUNTER — OFFICE VISIT (OUTPATIENT)
Dept: CARDIOLOGY CLINIC | Age: 64
End: 2024-03-15
Payer: COMMERCIAL

## 2024-03-15 VITALS
BODY MASS INDEX: 30.64 KG/M2 | HEIGHT: 70 IN | SYSTOLIC BLOOD PRESSURE: 148 MMHG | HEART RATE: 108 BPM | DIASTOLIC BLOOD PRESSURE: 74 MMHG | WEIGHT: 214 LBS

## 2024-03-15 DIAGNOSIS — I10 ESSENTIAL HYPERTENSION: ICD-10-CM

## 2024-03-15 DIAGNOSIS — I47.29 NSVT (NONSUSTAINED VENTRICULAR TACHYCARDIA) (HCC): ICD-10-CM

## 2024-03-15 DIAGNOSIS — Z98.890 S/P CARDIAC CATH: ICD-10-CM

## 2024-03-15 DIAGNOSIS — I50.42 CHRONIC COMBINED SYSTOLIC AND DIASTOLIC CONGESTIVE HEART FAILURE (HCC): Primary | ICD-10-CM

## 2024-03-15 DIAGNOSIS — I42.0 NONISCHEMIC DILATED CARDIOMYOPATHY (HCC): ICD-10-CM

## 2024-03-15 DIAGNOSIS — I48.21 PERMANENT ATRIAL FIBRILLATION (HCC): ICD-10-CM

## 2024-03-15 PROCEDURE — 1123F ACP DISCUSS/DSCN MKR DOCD: CPT | Performed by: INTERNAL MEDICINE

## 2024-03-15 PROCEDURE — 93000 ELECTROCARDIOGRAM COMPLETE: CPT | Performed by: INTERNAL MEDICINE

## 2024-03-15 PROCEDURE — G8484 FLU IMMUNIZE NO ADMIN: HCPCS | Performed by: INTERNAL MEDICINE

## 2024-03-15 PROCEDURE — G8417 CALC BMI ABV UP PARAM F/U: HCPCS | Performed by: INTERNAL MEDICINE

## 2024-03-15 PROCEDURE — 3077F SYST BP >= 140 MM HG: CPT | Performed by: INTERNAL MEDICINE

## 2024-03-15 PROCEDURE — 3078F DIAST BP <80 MM HG: CPT | Performed by: INTERNAL MEDICINE

## 2024-03-15 PROCEDURE — 3017F COLORECTAL CA SCREEN DOC REV: CPT | Performed by: INTERNAL MEDICINE

## 2024-03-15 PROCEDURE — 1036F TOBACCO NON-USER: CPT | Performed by: INTERNAL MEDICINE

## 2024-03-15 PROCEDURE — 99214 OFFICE O/P EST MOD 30 MIN: CPT | Performed by: INTERNAL MEDICINE

## 2024-03-15 PROCEDURE — G8427 DOCREV CUR MEDS BY ELIG CLIN: HCPCS | Performed by: INTERNAL MEDICINE

## 2024-03-15 RX ORDER — DIGOXIN 125 MCG
TABLET ORAL
Qty: 90 TABLET | Refills: 3 | Status: SHIPPED | OUTPATIENT
Start: 2024-03-15

## 2024-03-15 RX ORDER — SPIRONOLACTONE 25 MG/1
12.5 TABLET ORAL DAILY
Qty: 45 TABLET | Refills: 3 | Status: SHIPPED | OUTPATIENT
Start: 2024-03-15

## 2024-03-15 RX ORDER — BUMETANIDE 0.5 MG/1
TABLET ORAL
Qty: 45 TABLET | Refills: 3 | Status: SHIPPED | OUTPATIENT
Start: 2024-03-15

## 2024-03-15 RX ORDER — LISINOPRIL 2.5 MG/1
TABLET ORAL
Qty: 90 TABLET | Refills: 3 | Status: SHIPPED | OUTPATIENT
Start: 2024-03-15

## 2024-03-15 RX ORDER — METOPROLOL SUCCINATE 100 MG/1
100 TABLET, EXTENDED RELEASE ORAL 2 TIMES DAILY
Qty: 180 TABLET | Refills: 3 | Status: SHIPPED | OUTPATIENT
Start: 2024-03-15

## 2024-03-15 NOTE — PROGRESS NOTES
continue current medications, diet and exercise. Continue risk factor modification and medical management. Patient agreed with treatment plan. Follow up as directed.    Hx of abn Labs   Hx of tsh with reflex- abn dec 2019  TSH 10.7 and FT 4 1.09, Repeat TSH 9.3-Hence off  amiodarone  Repeat TSH  And FT4 I June 2020 WNL  PFT- WNL    The pat is Overall stable and doing well    Advised ccompliance with meds advised and did not take his med today and last dose of calan sr  180 was 1 week back   And vent rate 108    Discussed use, benefit, and side effects of prescribed medications. All patient questions answered. Pt voiced understanding. Instructed to continue current medications, diet and exercise. Continue risk factor modification and medical management. Patient agreed with treatment plan. Follow up as directed.      The patient is advised to attempt to improve diet and exercise patterns to aid in medical management of this problem.  Advised more plant based nutrition/meditarrean diet   Advised patient to call office or seek immediate medical attention if there is any new onset of  any chest pain, sob, palpitations, lightheadedness, dizziness, orthopnea, PND or pedal edema.   All medication side effects were discussed in details.      RTC in 6 months      Vanessa Gonzales MD,MD,FACC

## 2024-11-21 ENCOUNTER — TELEPHONE (OUTPATIENT)
Dept: CARDIOLOGY CLINIC | Age: 64
End: 2024-11-21

## 2024-11-21 DIAGNOSIS — I50.42 CHRONIC COMBINED SYSTOLIC AND DIASTOLIC CONGESTIVE HEART FAILURE (HCC): Primary | ICD-10-CM

## 2024-11-21 DIAGNOSIS — I42.0 NONISCHEMIC DILATED CARDIOMYOPATHY (HCC): ICD-10-CM

## 2024-11-21 DIAGNOSIS — I10 ESSENTIAL HYPERTENSION: ICD-10-CM

## 2024-11-22 ENCOUNTER — LAB (OUTPATIENT)
Dept: LAB | Age: 64
End: 2024-11-22

## 2024-11-22 DIAGNOSIS — I50.42 CHRONIC COMBINED SYSTOLIC AND DIASTOLIC CONGESTIVE HEART FAILURE (HCC): ICD-10-CM

## 2024-11-22 DIAGNOSIS — I10 ESSENTIAL HYPERTENSION: ICD-10-CM

## 2024-11-22 DIAGNOSIS — I42.0 NONISCHEMIC DILATED CARDIOMYOPATHY (HCC): ICD-10-CM

## 2024-11-22 LAB
ALBUMIN SERPL BCG-MCNC: 4.5 G/DL (ref 3.5–5.1)
ALP SERPL-CCNC: 80 U/L (ref 38–126)
ALT SERPL W/O P-5'-P-CCNC: 39 U/L (ref 11–66)
ANION GAP SERPL CALC-SCNC: 10 MEQ/L (ref 8–16)
AST SERPL-CCNC: 31 U/L (ref 5–40)
BILIRUB CONJ SERPL-MCNC: 0.2 MG/DL (ref 0.1–13.8)
BILIRUB SERPL-MCNC: 0.5 MG/DL (ref 0.3–1.2)
BUN SERPL-MCNC: 11 MG/DL (ref 7–22)
CALCIUM SERPL-MCNC: 9.7 MG/DL (ref 8.5–10.5)
CHLORIDE SERPL-SCNC: 104 MEQ/L (ref 98–111)
CHOLESTEROL, FASTING: 193 MG/DL (ref 100–199)
CO2 SERPL-SCNC: 25 MEQ/L (ref 23–33)
CREAT SERPL-MCNC: 0.8 MG/DL (ref 0.4–1.2)
GFR SERPL CREATININE-BSD FRML MDRD: > 90 ML/MIN/1.73M2
GLUCOSE SERPL-MCNC: 90 MG/DL (ref 70–108)
HDLC SERPL-MCNC: 55 MG/DL
LDLC SERPL CALC-MCNC: 105 MG/DL
MAGNESIUM SERPL-MCNC: 1.9 MG/DL (ref 1.6–2.4)
POTASSIUM SERPL-SCNC: 4.4 MEQ/L (ref 3.5–5.2)
PROT SERPL-MCNC: 7 G/DL (ref 6.1–8)
SODIUM SERPL-SCNC: 139 MEQ/L (ref 135–145)
TRIGLYCERIDE, FASTING: 167 MG/DL (ref 0–199)

## 2024-11-25 ENCOUNTER — OFFICE VISIT (OUTPATIENT)
Dept: CARDIOLOGY CLINIC | Age: 64
End: 2024-11-25
Payer: COMMERCIAL

## 2024-11-25 VITALS
DIASTOLIC BLOOD PRESSURE: 96 MMHG | HEART RATE: 96 BPM | HEIGHT: 70 IN | BODY MASS INDEX: 31.01 KG/M2 | SYSTOLIC BLOOD PRESSURE: 140 MMHG | WEIGHT: 216.6 LBS

## 2024-11-25 DIAGNOSIS — I47.29 NSVT (NONSUSTAINED VENTRICULAR TACHYCARDIA) (HCC): ICD-10-CM

## 2024-11-25 DIAGNOSIS — Z98.890 S/P CARDIAC CATH: ICD-10-CM

## 2024-11-25 DIAGNOSIS — I10 ESSENTIAL HYPERTENSION: ICD-10-CM

## 2024-11-25 DIAGNOSIS — I50.42 CHRONIC COMBINED SYSTOLIC AND DIASTOLIC CONGESTIVE HEART FAILURE (HCC): ICD-10-CM

## 2024-11-25 DIAGNOSIS — I42.0 NONISCHEMIC DILATED CARDIOMYOPATHY (HCC): Primary | ICD-10-CM

## 2024-11-25 DIAGNOSIS — I48.21 PERMANENT ATRIAL FIBRILLATION (HCC): ICD-10-CM

## 2024-11-25 PROCEDURE — G8427 DOCREV CUR MEDS BY ELIG CLIN: HCPCS | Performed by: INTERNAL MEDICINE

## 2024-11-25 PROCEDURE — G8417 CALC BMI ABV UP PARAM F/U: HCPCS | Performed by: INTERNAL MEDICINE

## 2024-11-25 PROCEDURE — 1123F ACP DISCUSS/DSCN MKR DOCD: CPT | Performed by: INTERNAL MEDICINE

## 2024-11-25 PROCEDURE — G8484 FLU IMMUNIZE NO ADMIN: HCPCS | Performed by: INTERNAL MEDICINE

## 2024-11-25 PROCEDURE — 3017F COLORECTAL CA SCREEN DOC REV: CPT | Performed by: INTERNAL MEDICINE

## 2024-11-25 PROCEDURE — 3080F DIAST BP >= 90 MM HG: CPT | Performed by: INTERNAL MEDICINE

## 2024-11-25 PROCEDURE — 99214 OFFICE O/P EST MOD 30 MIN: CPT | Performed by: INTERNAL MEDICINE

## 2024-11-25 PROCEDURE — 3077F SYST BP >= 140 MM HG: CPT | Performed by: INTERNAL MEDICINE

## 2024-11-25 PROCEDURE — 1036F TOBACCO NON-USER: CPT | Performed by: INTERNAL MEDICINE

## 2024-11-25 RX ORDER — VERAPAMIL HYDROCHLORIDE 240 MG/1
240 TABLET, FILM COATED, EXTENDED RELEASE ORAL DAILY
Qty: 90 TABLET | Refills: 3 | Status: SHIPPED | OUTPATIENT
Start: 2024-11-25

## 2024-11-25 NOTE — PROGRESS NOTES
Quit date: 2019    Tobacco comments:     occasional use   Vaping Use    Vaping status: Never Used   Substance and Sexual Activity    Alcohol use: Yes     Alcohol/week: 3.0 standard drinks of alcohol     Types: 3 Cans of beer per week     Comment: 3 beers a day    Drug use: No    Sexual activity: Yes     Partners: Female   Other Topics Concern    Not on file   Social History Narrative    Not on file     Social Determinants of Health     Financial Resource Strain: Low Risk  (3/31/2022)    Overall Financial Resource Strain (CARDIA)     Difficulty of Paying Living Expenses: Not hard at all   Food Insecurity: No Food Insecurity (3/31/2022)    Hunger Vital Sign     Worried About Running Out of Food in the Last Year: Never true     Ran Out of Food in the Last Year: Never true   Transportation Needs: Not on file   Physical Activity: Not on file   Stress: Not on file   Social Connections: Not on file   Intimate Partner Violence: Not on file   Housing Stability: Not on file       Current Outpatient Medications   Medication Sig Dispense Refill    verapamil (CALAN SR) 240 MG extended release tablet Take 1 tablet by mouth daily 90 tablet 3    spironolactone (ALDACTONE) 25 MG tablet Take 0.5 tablets by mouth daily 45 tablet 3    metoprolol succinate (TOPROL XL) 100 MG extended release tablet Take 1 tablet by mouth 2 times daily 180 tablet 3    lisinopril (PRINIVIL;ZESTRIL) 2.5 MG tablet TAKE ONE (1) TABLET BY MOUTH ONCE DAILY. 90 tablet 3    digoxin (LANOXIN) 125 MCG tablet Take 1 tablet once daily 90 tablet 3    bumetanide (BUMEX) 0.5 MG tablet TAKE ONE-HALF (1/2) TABLET BY MOUTH ONCE DAILY. 45 tablet 3    apixaban (ELIQUIS) 5 MG TABS tablet TAKE ONE (1) TABLET BY MOUTH TWO (2) TIMES A DAY. 180 tablet 3    folic acid (FOLVITE) 1 MG tablet Take 1 tablet by mouth daily 90 tablet 3    albuterol sulfate HFA (VENTOLIN HFA) 108 (90 Base) MCG/ACT inhaler Inhale 2 puffs into the lungs every 4 hours as needed for Wheezing or Shortness of

## 2024-11-27 RX ORDER — FOLIC ACID 1 MG/1
TABLET ORAL
Qty: 90 TABLET | Refills: 0 | Status: SHIPPED | OUTPATIENT
Start: 2024-11-27

## 2024-12-13 ENCOUNTER — HOSPITAL ENCOUNTER (OUTPATIENT)
Age: 64
Discharge: HOME | End: 2024-12-15
Attending: INTERNAL MEDICINE
Payer: COMMERCIAL

## 2024-12-13 DIAGNOSIS — I48.21 PERMANENT ATRIAL FIBRILLATION (HCC): ICD-10-CM

## 2024-12-13 PROCEDURE — 93242 EXT ECG>48HR<7D RECORDING: CPT

## 2025-02-21 ENCOUNTER — OFFICE VISIT (OUTPATIENT)
Dept: CARDIOLOGY CLINIC | Age: 65
End: 2025-02-21
Payer: COMMERCIAL

## 2025-02-21 VITALS
HEIGHT: 70 IN | BODY MASS INDEX: 31.5 KG/M2 | SYSTOLIC BLOOD PRESSURE: 138 MMHG | HEART RATE: 81 BPM | WEIGHT: 220 LBS | DIASTOLIC BLOOD PRESSURE: 94 MMHG

## 2025-02-21 DIAGNOSIS — I42.0 NONISCHEMIC DILATED CARDIOMYOPATHY (HCC): ICD-10-CM

## 2025-02-21 DIAGNOSIS — I48.21 PERMANENT ATRIAL FIBRILLATION (HCC): ICD-10-CM

## 2025-02-21 DIAGNOSIS — I47.29 NSVT (NONSUSTAINED VENTRICULAR TACHYCARDIA) (HCC): ICD-10-CM

## 2025-02-21 DIAGNOSIS — I50.42 CHRONIC COMBINED SYSTOLIC AND DIASTOLIC CONGESTIVE HEART FAILURE (HCC): Primary | ICD-10-CM

## 2025-02-21 DIAGNOSIS — I10 ESSENTIAL HYPERTENSION: ICD-10-CM

## 2025-02-21 DIAGNOSIS — Z98.890 S/P CARDIAC CATH: ICD-10-CM

## 2025-02-21 PROCEDURE — G8417 CALC BMI ABV UP PARAM F/U: HCPCS | Performed by: INTERNAL MEDICINE

## 2025-02-21 PROCEDURE — 3080F DIAST BP >= 90 MM HG: CPT | Performed by: INTERNAL MEDICINE

## 2025-02-21 PROCEDURE — 93000 ELECTROCARDIOGRAM COMPLETE: CPT | Performed by: INTERNAL MEDICINE

## 2025-02-21 PROCEDURE — 1123F ACP DISCUSS/DSCN MKR DOCD: CPT | Performed by: INTERNAL MEDICINE

## 2025-02-21 PROCEDURE — 3017F COLORECTAL CA SCREEN DOC REV: CPT | Performed by: INTERNAL MEDICINE

## 2025-02-21 PROCEDURE — G8427 DOCREV CUR MEDS BY ELIG CLIN: HCPCS | Performed by: INTERNAL MEDICINE

## 2025-02-21 PROCEDURE — 1036F TOBACCO NON-USER: CPT | Performed by: INTERNAL MEDICINE

## 2025-02-21 PROCEDURE — 3075F SYST BP GE 130 - 139MM HG: CPT | Performed by: INTERNAL MEDICINE

## 2025-02-21 PROCEDURE — 99214 OFFICE O/P EST MOD 30 MIN: CPT | Performed by: INTERNAL MEDICINE

## 2025-02-21 RX ORDER — METOPROLOL SUCCINATE 100 MG/1
100 TABLET, EXTENDED RELEASE ORAL 2 TIMES DAILY
Qty: 180 TABLET | Refills: 3 | Status: SHIPPED | OUTPATIENT
Start: 2025-02-21

## 2025-02-21 RX ORDER — FOLIC ACID 1 MG/1
1 TABLET ORAL DAILY
Qty: 90 TABLET | Refills: 3 | Status: SHIPPED | OUTPATIENT
Start: 2025-02-21

## 2025-02-21 RX ORDER — LISINOPRIL 2.5 MG/1
TABLET ORAL
Qty: 90 TABLET | Refills: 3 | Status: CANCELLED | OUTPATIENT
Start: 2025-02-21

## 2025-02-21 RX ORDER — LISINOPRIL 10 MG/1
10 TABLET ORAL DAILY
Qty: 90 TABLET | Refills: 3 | Status: SHIPPED | OUTPATIENT
Start: 2025-02-21

## 2025-02-21 RX ORDER — DIGOXIN 125 MCG
TABLET ORAL
Qty: 90 TABLET | Refills: 3 | Status: SHIPPED | OUTPATIENT
Start: 2025-02-21

## 2025-02-21 RX ORDER — BUMETANIDE 0.5 MG/1
TABLET ORAL
Qty: 45 TABLET | Refills: 3 | Status: SHIPPED | OUTPATIENT
Start: 2025-02-21

## 2025-02-21 RX ORDER — SPIRONOLACTONE 25 MG/1
12.5 TABLET ORAL DAILY
Qty: 45 TABLET | Refills: 3 | Status: SHIPPED | OUTPATIENT
Start: 2025-02-21

## 2025-02-21 NOTE — PROGRESS NOTES
Chief Complaint   Patient presents with    Follow-up    Cardiomyopathy    Congestive Heart Failure   Originally Patient here for hospital follow-up.  REASON FOR CONSULTATION:  Evaluation for atrial fibrillation with rapid  ventricular response and cardiomyopathy with ejection fraction 20% to 25%.   PRESENTED WITH LEG EDEMA AND SOB  hX of HAVEN cardioversion.  Hx of  atr fib with CVR then back in to NSR  Hx of . CHF, CMP and atr fib        6 months Follow up.    EKG done today.    No wt gain    Denied chest pain, sob, palpitations,dizziness or edema    No cp    No sob    No leg edema and all resolved    No N/v/d    Doing well       Patient Active Problem List   Diagnosis    Encounter for cardioversion procedure    Leukocytosis    Polycythemia    Obesity (BMI 30-39.9)    Alcohol abuse    NSVT (nonsustained ventricular tachycardia) (HCC)    Anxiety about health    Irritability    Uncomplicated alcohol withdrawal without perceptual disturbances (HCC)    S/P cardiac cath 12/20/2017- Angiographically Patent Coronaries, edp 11 mmhg, ef 25%- med RX    Nonischemic dilated cardiomyopathy (HCC) EF improved  EF 25 to 45%    Essential hypertension    Permanent atrial fibrillation (HCC)    Chronic combined systolic and diastolic congestive heart failure (HCC)    Non-compliance with f/u advised    Noncompliance       Past Surgical History:   Procedure Laterality Date    CHOLECYSTECTOMY         Allergies   Allergen Reactions    Augmentin [Amoxicillin-Pot Clavulanate] Shortness Of Breath and Swelling        Family History   Problem Relation Age of Onset    Cancer Father         Social History     Socioeconomic History    Marital status: Single     Spouse name: Not on file    Number of children: Not on file    Years of education: Not on file    Highest education level: Not on file   Occupational History    Not on file   Tobacco Use    Smoking status: Never    Smokeless tobacco: Former     Types: Snuff     Quit date: 2019    Tobacco

## 2025-04-25 ENCOUNTER — APPOINTMENT (OUTPATIENT)
Dept: CT IMAGING | Age: 65
End: 2025-04-25
Attending: INTERNAL MEDICINE
Payer: COMMERCIAL

## 2025-04-25 ENCOUNTER — HOSPITAL ENCOUNTER (OUTPATIENT)
Age: 65
Setting detail: OBSERVATION
LOS: 1 days | Discharge: HOME | End: 2025-04-26
Attending: INTERNAL MEDICINE
Payer: COMMERCIAL

## 2025-04-25 ENCOUNTER — APPOINTMENT (OUTPATIENT)
Dept: ULTRASOUND IMAGING | Age: 65
End: 2025-04-25
Attending: INTERNAL MEDICINE
Payer: COMMERCIAL

## 2025-04-25 DIAGNOSIS — R07.9 CHEST PAIN, UNSPECIFIED TYPE: Primary | ICD-10-CM

## 2025-04-25 LAB
ALBUMIN SERPL BCG-MCNC: 4.1 G/DL (ref 3.4–4.9)
ALP SERPL-CCNC: 49 U/L (ref 40–129)
ALT SERPL W/O P-5'-P-CCNC: 153 U/L (ref 10–50)
ANION GAP SERPL CALC-SCNC: 12 MEQ/L (ref 8–16)
AST SERPL-CCNC: 124 U/L (ref 10–50)
BACTERIA URNS QL MICRO: ABNORMAL /HPF
BASOPHILS ABSOLUTE: 0 THOU/MM3 (ref 0–0.1)
BASOPHILS NFR BLD AUTO: 0.2 %
BILIRUB CONJ SERPL-MCNC: 1.2 MG/DL (ref 0–0.2)
BILIRUB SERPL-MCNC: 2.3 MG/DL (ref 0.3–1.2)
BILIRUB UR QL STRIP.AUTO: NEGATIVE
BUN SERPL-MCNC: 16 MG/DL (ref 8–23)
CALCIUM SERPL-MCNC: 10.5 MG/DL (ref 8.8–10.2)
CASTS #/AREA URNS LPF: ABNORMAL /LPF
CASTS 2: ABNORMAL /LPF
CHARACTER UR: CLEAR
CHLORIDE SERPL-SCNC: 103 MEQ/L (ref 98–111)
CO2 SERPL-SCNC: 21 MEQ/L (ref 22–29)
COLOR, UA: ABNORMAL
CREAT SERPL-MCNC: 1 MG/DL (ref 0.7–1.2)
CRP SERPL-MCNC: 3.41 MG/DL (ref 0–0.5)
CRYSTALS URNS MICRO: ABNORMAL
DEPRECATED RDW RBC AUTO: 46.5 FL (ref 35–45)
DIGOXIN SERPL-MCNC: 0.8 NG/ML (ref 0.5–2)
EOSINOPHIL NFR BLD AUTO: 0.1 %
EOSINOPHILS ABSOLUTE: 0 THOU/MM3 (ref 0–0.4)
EPITHELIAL CELLS, UA: ABNORMAL /HPF
ERYTHROCYTE [DISTWIDTH] IN BLOOD BY AUTOMATED COUNT: 13.1 % (ref 11.5–14.5)
ERYTHROCYTE [SEDIMENTATION RATE] IN BLOOD BY WESTERGREN METHOD: 1 MM/HR (ref 0–10)
ETHANOL SERPL-MCNC: < 0.01 % (ref 0–0.08)
GFR SERPL CREATININE-BSD FRML MDRD: 83 ML/MIN/1.73M2
GLUCOSE SERPL-MCNC: 123 MG/DL (ref 74–109)
GLUCOSE UR QL STRIP.AUTO: NEGATIVE MG/DL
HCT VFR BLD AUTO: 45.9 % (ref 42–52)
HGB BLD-MCNC: 16.1 GM/DL (ref 14–18)
HGB UR QL STRIP.AUTO: NEGATIVE
IMM GRANULOCYTES # BLD AUTO: 0.05 THOU/MM3 (ref 0–0.07)
IMM GRANULOCYTES NFR BLD AUTO: 0.4 %
KETONES UR QL STRIP.AUTO: NEGATIVE
LIPASE SERPL-CCNC: 30 U/L (ref 13–60)
LYMPHOCYTES ABSOLUTE: 1.6 THOU/MM3 (ref 1–4.8)
LYMPHOCYTES NFR BLD AUTO: 12.1 %
MAGNESIUM SERPL-MCNC: 1.8 MG/DL (ref 1.6–2.6)
MCH RBC QN AUTO: 33.9 PG (ref 26–33)
MCHC RBC AUTO-ENTMCNC: 35.1 GM/DL (ref 32.2–35.5)
MCV RBC AUTO: 96.6 FL (ref 80–94)
MISCELLANEOUS 2: ABNORMAL
MONOCYTES ABSOLUTE: 1.7 THOU/MM3 (ref 0.4–1.3)
MONOCYTES NFR BLD AUTO: 12.6 %
NEUTROPHILS ABSOLUTE: 10.1 THOU/MM3 (ref 1.8–7.7)
NEUTROPHILS NFR BLD AUTO: 74.6 %
NITRITE UR QL STRIP: NEGATIVE
NRBC BLD AUTO-RTO: 0 /100 WBC
NT-PROBNP SERPL IA-MCNC: 1489 PG/ML (ref 0–124)
PH UR STRIP.AUTO: 5.5 [PH] (ref 5–9)
PLATELET # BLD AUTO: 180 THOU/MM3 (ref 130–400)
PMV BLD AUTO: 10.3 FL (ref 9.4–12.4)
POTASSIUM SERPL-SCNC: 4.9 MEQ/L (ref 3.5–5.2)
PROCALCITONIN SERPL IA-MCNC: 0.14 NG/ML (ref 0.01–0.09)
PROT SERPL-MCNC: 6.7 G/DL (ref 6.4–8.3)
PROT UR STRIP.AUTO-MCNC: NEGATIVE MG/DL
RBC # BLD AUTO: 4.75 MILL/MM3 (ref 4.7–6.1)
RBC URINE: ABNORMAL /HPF
RENAL EPI CELLS #/AREA URNS HPF: ABNORMAL /[HPF]
SODIUM SERPL-SCNC: 136 MEQ/L (ref 135–145)
SP GR UR REFRACT.AUTO: 1.03 (ref 1–1.03)
TROPONIN, HIGH SENSITIVITY: 7 NG/L (ref 0–12)
TROPONIN, HIGH SENSITIVITY: < 6 NG/L (ref 0–12)
UROBILINOGEN, URINE: 1 EU/DL (ref 0–1)
WBC # BLD AUTO: 13.6 THOU/MM3 (ref 4.8–10.8)
WBC #/AREA URNS HPF: ABNORMAL /HPF
WBC #/AREA URNS HPF: ABNORMAL /[HPF]
YEAST LIKE FUNGI URNS QL MICRO: ABNORMAL

## 2025-04-25 PROCEDURE — 81001 URINALYSIS AUTO W/SCOPE: CPT

## 2025-04-25 PROCEDURE — 83880 ASSAY OF NATRIURETIC PEPTIDE: CPT

## 2025-04-25 PROCEDURE — 76705 ECHO EXAM OF ABDOMEN: CPT

## 2025-04-25 PROCEDURE — 74177 CT ABD & PELVIS W/CONTRAST: CPT

## 2025-04-25 PROCEDURE — 0202U NFCT DS 22 TRGT SARS-COV-2: CPT

## 2025-04-25 PROCEDURE — 84145 PROCALCITONIN (PCT): CPT

## 2025-04-25 PROCEDURE — 87040 BLOOD CULTURE FOR BACTERIA: CPT

## 2025-04-25 PROCEDURE — 71275 CT ANGIOGRAPHY CHEST: CPT

## 2025-04-25 PROCEDURE — 93005 ELECTROCARDIOGRAM TRACING: CPT | Performed by: PHYSICIAN ASSISTANT

## 2025-04-25 PROCEDURE — 80162 ASSAY OF DIGOXIN TOTAL: CPT

## 2025-04-25 PROCEDURE — 36415 COLL VENOUS BLD VENIPUNCTURE: CPT

## 2025-04-25 PROCEDURE — 83735 ASSAY OF MAGNESIUM: CPT

## 2025-04-25 PROCEDURE — 82077 ASSAY SPEC XCP UR&BREATH IA: CPT

## 2025-04-25 PROCEDURE — 6370000000 HC RX 637 (ALT 250 FOR IP): Performed by: PHYSICIAN ASSISTANT

## 2025-04-25 PROCEDURE — G0379 DIRECT REFER HOSPITAL OBSERV: HCPCS

## 2025-04-25 PROCEDURE — 83690 ASSAY OF LIPASE: CPT

## 2025-04-25 PROCEDURE — 87086 URINE CULTURE/COLONY COUNT: CPT

## 2025-04-25 PROCEDURE — 6360000004 HC RX CONTRAST MEDICATION: Performed by: PHYSICIAN ASSISTANT

## 2025-04-25 PROCEDURE — 80053 COMPREHEN METABOLIC PANEL: CPT

## 2025-04-25 PROCEDURE — 2500000003 HC RX 250 WO HCPCS: Performed by: PHYSICIAN ASSISTANT

## 2025-04-25 PROCEDURE — G0378 HOSPITAL OBSERVATION PER HR: HCPCS

## 2025-04-25 PROCEDURE — 96375 TX/PRO/DX INJ NEW DRUG ADDON: CPT

## 2025-04-25 PROCEDURE — 86140 C-REACTIVE PROTEIN: CPT

## 2025-04-25 PROCEDURE — 82248 BILIRUBIN DIRECT: CPT

## 2025-04-25 PROCEDURE — 6360000002 HC RX W HCPCS: Performed by: PHYSICIAN ASSISTANT

## 2025-04-25 PROCEDURE — 84484 ASSAY OF TROPONIN QUANT: CPT

## 2025-04-25 PROCEDURE — 85651 RBC SED RATE NONAUTOMATED: CPT

## 2025-04-25 PROCEDURE — 85025 COMPLETE CBC W/AUTO DIFF WBC: CPT

## 2025-04-25 PROCEDURE — 99223 1ST HOSP IP/OBS HIGH 75: CPT | Performed by: PHYSICIAN ASSISTANT

## 2025-04-25 RX ORDER — LISINOPRIL 10 MG/1
10 TABLET ORAL DAILY
Status: DISCONTINUED | OUTPATIENT
Start: 2025-04-26 | End: 2025-04-26 | Stop reason: HOSPADM

## 2025-04-25 RX ORDER — KETOROLAC TROMETHAMINE 30 MG/ML
15 INJECTION, SOLUTION INTRAMUSCULAR; INTRAVENOUS EVERY 6 HOURS PRN
Status: DISCONTINUED | OUTPATIENT
Start: 2025-04-25 | End: 2025-04-26 | Stop reason: HOSPADM

## 2025-04-25 RX ORDER — ACETAMINOPHEN 325 MG/1
650 TABLET ORAL EVERY 6 HOURS PRN
Status: DISCONTINUED | OUTPATIENT
Start: 2025-04-25 | End: 2025-04-26 | Stop reason: HOSPADM

## 2025-04-25 RX ORDER — MAGNESIUM SULFATE IN WATER 40 MG/ML
2000 INJECTION, SOLUTION INTRAVENOUS PRN
Status: DISCONTINUED | OUTPATIENT
Start: 2025-04-25 | End: 2025-04-26 | Stop reason: HOSPADM

## 2025-04-25 RX ORDER — ASPIRIN 81 MG/1
81 TABLET, CHEWABLE ORAL DAILY
Status: DISCONTINUED | OUTPATIENT
Start: 2025-04-26 | End: 2025-04-26 | Stop reason: HOSPADM

## 2025-04-25 RX ORDER — ACETAMINOPHEN 650 MG/1
650 SUPPOSITORY RECTAL EVERY 6 HOURS PRN
Status: DISCONTINUED | OUTPATIENT
Start: 2025-04-25 | End: 2025-04-26 | Stop reason: HOSPADM

## 2025-04-25 RX ORDER — DIGOXIN 125 MCG
125 TABLET ORAL DAILY
Status: DISCONTINUED | OUTPATIENT
Start: 2025-04-26 | End: 2025-04-26 | Stop reason: HOSPADM

## 2025-04-25 RX ORDER — SODIUM CHLORIDE 9 MG/ML
INJECTION, SOLUTION INTRAVENOUS PRN
Status: DISCONTINUED | OUTPATIENT
Start: 2025-04-25 | End: 2025-04-26 | Stop reason: HOSPADM

## 2025-04-25 RX ORDER — POTASSIUM CHLORIDE 7.45 MG/ML
10 INJECTION INTRAVENOUS PRN
Status: DISCONTINUED | OUTPATIENT
Start: 2025-04-25 | End: 2025-04-26 | Stop reason: HOSPADM

## 2025-04-25 RX ORDER — ONDANSETRON 4 MG/1
4 TABLET, ORALLY DISINTEGRATING ORAL EVERY 8 HOURS PRN
Status: DISCONTINUED | OUTPATIENT
Start: 2025-04-25 | End: 2025-04-26 | Stop reason: HOSPADM

## 2025-04-25 RX ORDER — POLYETHYLENE GLYCOL 3350 17 G/17G
17 POWDER, FOR SOLUTION ORAL DAILY PRN
Status: DISCONTINUED | OUTPATIENT
Start: 2025-04-25 | End: 2025-04-26 | Stop reason: HOSPADM

## 2025-04-25 RX ORDER — LANOLIN ALCOHOL/MO/W.PET/CERES
100 CREAM (GRAM) TOPICAL DAILY
Status: DISCONTINUED | OUTPATIENT
Start: 2025-04-26 | End: 2025-04-26 | Stop reason: HOSPADM

## 2025-04-25 RX ORDER — POTASSIUM CHLORIDE 1500 MG/1
40 TABLET, EXTENDED RELEASE ORAL PRN
Status: DISCONTINUED | OUTPATIENT
Start: 2025-04-25 | End: 2025-04-26 | Stop reason: HOSPADM

## 2025-04-25 RX ORDER — METOPROLOL SUCCINATE 100 MG/1
100 TABLET, EXTENDED RELEASE ORAL 2 TIMES DAILY
Status: DISCONTINUED | OUTPATIENT
Start: 2025-04-25 | End: 2025-04-26 | Stop reason: HOSPADM

## 2025-04-25 RX ORDER — SPIRONOLACTONE 25 MG/1
12.5 TABLET ORAL DAILY
Status: DISCONTINUED | OUTPATIENT
Start: 2025-04-26 | End: 2025-04-26 | Stop reason: HOSPADM

## 2025-04-25 RX ORDER — SODIUM CHLORIDE 0.9 % (FLUSH) 0.9 %
5-40 SYRINGE (ML) INJECTION EVERY 12 HOURS SCHEDULED
Status: DISCONTINUED | OUTPATIENT
Start: 2025-04-25 | End: 2025-04-26 | Stop reason: HOSPADM

## 2025-04-25 RX ORDER — IOPAMIDOL 755 MG/ML
80 INJECTION, SOLUTION INTRAVASCULAR
Status: COMPLETED | OUTPATIENT
Start: 2025-04-25 | End: 2025-04-25

## 2025-04-25 RX ORDER — VERAPAMIL HYDROCHLORIDE 240 MG/1
240 TABLET, FILM COATED, EXTENDED RELEASE ORAL DAILY
Status: DISCONTINUED | OUTPATIENT
Start: 2025-04-26 | End: 2025-04-26 | Stop reason: HOSPADM

## 2025-04-25 RX ORDER — ONDANSETRON 2 MG/ML
4 INJECTION INTRAMUSCULAR; INTRAVENOUS EVERY 6 HOURS PRN
Status: DISCONTINUED | OUTPATIENT
Start: 2025-04-25 | End: 2025-04-26 | Stop reason: HOSPADM

## 2025-04-25 RX ORDER — SODIUM CHLORIDE 0.9 % (FLUSH) 0.9 %
5-40 SYRINGE (ML) INJECTION PRN
Status: DISCONTINUED | OUTPATIENT
Start: 2025-04-25 | End: 2025-04-26 | Stop reason: HOSPADM

## 2025-04-25 RX ADMIN — KETOROLAC TROMETHAMINE 15 MG: 30 INJECTION, SOLUTION INTRAMUSCULAR at 19:57

## 2025-04-25 RX ADMIN — LIDOCAINE HYDROCHLORIDE: 20 SOLUTION ORAL at 18:25

## 2025-04-25 RX ADMIN — METOPROLOL SUCCINATE 100 MG: 100 TABLET, EXTENDED RELEASE ORAL at 19:53

## 2025-04-25 RX ADMIN — SODIUM CHLORIDE, PRESERVATIVE FREE 10 ML: 5 INJECTION INTRAVENOUS at 19:53

## 2025-04-25 RX ADMIN — IOPAMIDOL 80 ML: 755 INJECTION, SOLUTION INTRAVENOUS at 21:29

## 2025-04-25 ASSESSMENT — PAIN DESCRIPTION - FREQUENCY: FREQUENCY: INTERMITTENT

## 2025-04-25 ASSESSMENT — PAIN DESCRIPTION - ONSET: ONSET: ON-GOING

## 2025-04-25 ASSESSMENT — PAIN DESCRIPTION - DESCRIPTORS: DESCRIPTORS: ACHING

## 2025-04-25 ASSESSMENT — PAIN DESCRIPTION - LOCATION
LOCATION: ABDOMEN
LOCATION: CHEST

## 2025-04-25 ASSESSMENT — PAIN - FUNCTIONAL ASSESSMENT: PAIN_FUNCTIONAL_ASSESSMENT: ACTIVITIES ARE NOT PREVENTED

## 2025-04-25 ASSESSMENT — PAIN DESCRIPTION - ORIENTATION: ORIENTATION: UPPER

## 2025-04-25 ASSESSMENT — PAIN SCALES - GENERAL
PAINLEVEL_OUTOF10: 5
PAINLEVEL_OUTOF10: 5

## 2025-04-25 ASSESSMENT — PAIN DESCRIPTION - PAIN TYPE: TYPE: ACUTE PAIN

## 2025-04-25 NOTE — PROGRESS NOTES
Transfer from [facility]59 sees Christian, Hx AF on Eloquis and Dig, alcoholic, ETOH .029 5 am CP min sob AF in the 60-70's trop x 2 neg DD neg chest ok and EKG AF asa Toradol Folic acid 123/88 94% 20Dr Viry adm

## 2025-04-25 NOTE — PROCEDURES
PROCEDURE NOTE  Date: 4/25/2025   Name: Crjohnathan Silva  YOB: 1960    Procedures  EKG completed

## 2025-04-25 NOTE — H&P
Hospitalist History & Physical    Patient:  Cr Silva    Unit/Bed:8A-03/003-A  YOB: 1960  MRN: [id number]   PCP: Russel Canela APRN - CNP  Code Status: Full Code    Date of Service: The patient was seen and examined on 04/25/25 and admitted to Observation with an expected length of stay of less than two midnights due to medical therapy.     Chief Complaint: Chest pain    Assessment/Plan:    Chest pain  Initial troponin 7, repeat pending  EKG shows atrial fibrillation with ventricular rate of 90 bpm, no ischemic changes  Hold Eliquis for now.  Continue aspirin.  Trial of GI cocktail.  Will trial Toradol for pain.  Low suspicion for ACS. ? If related to GI given transaminitis, elevated bilirubin.  Consider cardiology consultation if chest pain fails to improve and GI workup is unrevealing.  HEART score 4  NPO at midnight.  Check TTE.  Transaminitis  AST and ALT mildly elevated at outside facility.  History of cholecystectomy.   Repeat LFTs show , , direct bilirubin of 1.2, total bilirubin of 2.3.  Obtain right upper quadrant ultrasound for further evaluation.  May need to consider MRCP pending results of RUQ ultrasound and repeat hepatic function panel.  Hypertension  Continue home regimen.  Permanent atrial fibrillation with secondary hypercoagulable state  Continue home rate controlling medications.  Hold Eliquis for now.  Alcohol abuse  Patient endorses drinking 5 standard drinks per day on a daily basis.  Reports no history of alcohol withdrawal.  Last drink last evening.  Cessation advised.    History of Present Illness:  Cr Silva is a 65 y.o. male with a history of hypertension, permanent atrial fibrillation on Eliquis, and alcohol abuse who presented to [facility] with chief complaint of chest pain. The patient reports he woke up this morning along with chest pain.  This is located in the center of his chest and has radiated to his back.  The  patient does report that it seems to get worse if he lays on his back.  It does not seem to vary with respirations.  He has not noted any associated dyspnea, diaphoresis, lightheadedness/dizziness, abdominal pain, nausea, or vomiting.  The patient reports the pain is dull.  It has not varied any with exertion.  The patient reports his pain improved with Toradol at an outside facility.  At the outside facility, an EKG and troponins were negative.  The patient was subsequently transferred for further evaluation.  He denies any history of similar pain.  He does have intermittent acid reflux, but reports this is different.  The patient denies any history of coronary artery disease.  He does have permanent atrial fibrillation and is on Eliquis.  His last dose was this morning.  The patient denies any fevers, chills, lower extremity edema, orthopnea, or urinary symptoms.  The patient does not currently smoke.  He drinks approximately 5 standard drinks per day.  His last drink was last evening.  No history of alcohol withdrawal.  The patient does not smoke.  He denies any history of hyperlipidemia or diabetes.       Review of Systems: Pertinent positives as noted in the HPI. All other systems reviewed and negative.    Medications Prior to Admission:   Prior to Admission Medications   Prescriptions Last Dose Informant Patient Reported? Taking?   Multiple Vitamin (MULTIVITAMIN) tablet 4/25/2025  No Yes   Sig: Take 1 tablet by mouth daily   albuterol sulfate HFA (VENTOLIN HFA) 108 (90 Base) MCG/ACT inhaler Not Taking  No No   Sig: Inhale 2 puffs into the lungs every 4 hours as needed for Wheezing or Shortness of Breath   Patient not taking: Reported on 4/25/2025   apixaban (ELIQUIS) 5 MG TABS tablet 4/25/2025  No Yes   Sig: TAKE ONE (1) TABLET BY MOUTH TWO (2) TIMES A DAY.   bumetanide (BUMEX) 0.5 MG tablet Not Taking  No No   Sig: TAKE ONE-HALF (1/2) TABLET BY MOUTH ONCE DAILY.   Patient not taking: Reported on 4/25/2025

## 2025-04-26 ENCOUNTER — APPOINTMENT (OUTPATIENT)
Age: 65
End: 2025-04-26
Attending: PHYSICIAN ASSISTANT
Payer: COMMERCIAL

## 2025-04-26 VITALS
DIASTOLIC BLOOD PRESSURE: 73 MMHG | SYSTOLIC BLOOD PRESSURE: 105 MMHG | HEART RATE: 63 BPM | RESPIRATION RATE: 18 BRPM | TEMPERATURE: 97.9 F | WEIGHT: 205 LBS | HEIGHT: 70 IN | OXYGEN SATURATION: 99 % | BODY MASS INDEX: 29.35 KG/M2

## 2025-04-26 LAB
ALBUMIN SERPL BCG-MCNC: 3.8 G/DL (ref 3.4–4.9)
ALP SERPL-CCNC: 44 U/L (ref 40–129)
ALT SERPL W/O P-5'-P-CCNC: 108 U/L (ref 10–50)
ANION GAP SERPL CALC-SCNC: 9 MEQ/L (ref 8–16)
AST SERPL-CCNC: 60 U/L (ref 10–50)
B PERT DNA NPH QL NAA+PROBE: NOT DETECTED
BASOPHILS ABSOLUTE: 0 THOU/MM3 (ref 0–0.1)
BASOPHILS NFR BLD AUTO: 0.5 %
BILIRUB CONJ SERPL-MCNC: 0.7 MG/DL (ref 0–0.2)
BILIRUB SERPL-MCNC: 1.5 MG/DL (ref 0.3–1.2)
BORDETELLA PARAPERTUSSIS BY PCR: NOT DETECTED
BUN SERPL-MCNC: 17 MG/DL (ref 8–23)
C PNEUM DNA SPEC QL NAA+PROBE: NOT DETECTED
CALCIUM SERPL-MCNC: 9.9 MG/DL (ref 8.8–10.2)
CHLORIDE SERPL-SCNC: 102 MEQ/L (ref 98–111)
CO2 SERPL-SCNC: 25 MEQ/L (ref 22–29)
CREAT SERPL-MCNC: 1 MG/DL (ref 0.7–1.2)
DEPRECATED RDW RBC AUTO: 47.5 FL (ref 35–45)
ECHO AO ASC DIAM: 3.5 CM
ECHO AO ASCENDING AORTA INDEX: 1.66 CM/M2
ECHO AR MAX VEL PISA: 2.8 M/S
ECHO AV CUSP MM: 1.7 CM
ECHO AV PEAK GRADIENT: 6 MMHG
ECHO AV PEAK VELOCITY: 1.2 M/S
ECHO AV REGURGITANT PHT: 967 MS
ECHO AV VELOCITY RATIO: 0.83
ECHO BSA: 2.14 M2
ECHO EST RA PRESSURE: 10 MMHG
ECHO LA AREA 2C: 23.4 CM2
ECHO LA AREA 4C: 21.3 CM2
ECHO LA DIAMETER INDEX: 1.8 CM/M2
ECHO LA DIAMETER: 3.8 CM
ECHO LA MAJOR AXIS: 6.5 CM
ECHO LA MINOR AXIS: 6.5 CM
ECHO LA VOL BP: 61 ML (ref 18–58)
ECHO LA VOL MOD A2C: 68 ML (ref 18–58)
ECHO LA VOL MOD A4C: 55 ML (ref 18–58)
ECHO LA VOL/BSA BIPLANE: 29 ML/M2 (ref 16–34)
ECHO LA VOLUME INDEX MOD A2C: 32 ML/M2 (ref 16–34)
ECHO LA VOLUME INDEX MOD A4C: 26 ML/M2 (ref 16–34)
ECHO LV EF PHYSICIAN: 55 %
ECHO LV FRACTIONAL SHORTENING: 29 % (ref 28–44)
ECHO LV INTERNAL DIMENSION DIASTOLE INDEX: 2.42 CM/M2
ECHO LV INTERNAL DIMENSION DIASTOLIC: 5.1 CM (ref 4.2–5.9)
ECHO LV INTERNAL DIMENSION SYSTOLIC INDEX: 1.71 CM/M2
ECHO LV INTERNAL DIMENSION SYSTOLIC: 3.6 CM
ECHO LV ISOVOLUMETRIC RELAXATION TIME (IVRT): 70 MS
ECHO LV IVSD: 1.1 CM (ref 0.6–1)
ECHO LV MASS 2D: 227.4 G (ref 88–224)
ECHO LV MASS INDEX 2D: 107.8 G/M2 (ref 49–115)
ECHO LV POSTERIOR WALL DIASTOLIC: 1.2 CM (ref 0.6–1)
ECHO LV RELATIVE WALL THICKNESS RATIO: 0.47
ECHO LVOT PEAK GRADIENT: 4 MMHG
ECHO LVOT PEAK VELOCITY: 1 M/S
ECHO MV E DECELERATION TIME (DT): 180 MS
ECHO MV E VELOCITY: 1.05 M/S
ECHO MV REGURGITANT PEAK GRADIENT: 71 MMHG
ECHO MV REGURGITANT PEAK VELOCITY: 4.2 M/S
ECHO PV MAX VELOCITY: 0.7 M/S
ECHO PV PEAK GRADIENT: 2 MMHG
ECHO RIGHT VENTRICULAR SYSTOLIC PRESSURE (RVSP): 39 MMHG
ECHO RV INTERNAL DIMENSION: 3.3 CM
ECHO RV TAPSE: 1.3 CM (ref 1.7–?)
ECHO TV E WAVE: 0.7 M/S
ECHO TV REGURGITANT MAX VELOCITY: 2.7 M/S
ECHO TV REGURGITANT PEAK GRADIENT: 29 MMHG
EKG Q-T INTERVAL: 326 MS
EKG QRS DURATION: 92 MS
EKG QTC CALCULATION (BAZETT): 398 MS
EKG R AXIS: 96 DEGREES
EKG T AXIS: 32 DEGREES
EKG VENTRICULAR RATE: 90 BPM
EOSINOPHIL NFR BLD AUTO: 0.9 %
EOSINOPHILS ABSOLUTE: 0.1 THOU/MM3 (ref 0–0.4)
ERYTHROCYTE [DISTWIDTH] IN BLOOD BY AUTOMATED COUNT: 13.2 % (ref 11.5–14.5)
FILM ARRAY INFLUENZA A VIRUS H1: NORMAL
FLUAV H1 2009 PAND RNA NPH QL NAA+PROBE: NORMAL
FLUAV H3 RNA NPH QL NAA+PROBE: NORMAL
FLUAV RNA NPH QL NAA+PROBE: NOT DETECTED
FLUBV RNA NPH QL NAA+PROBE: NOT DETECTED
GFR SERPL CREATININE-BSD FRML MDRD: 83 ML/MIN/1.73M2
GLUCOSE SERPL-MCNC: 96 MG/DL (ref 74–109)
HADV DNA NPH QL NAA+PROBE: NOT DETECTED
HCOV 229E RNA SPEC QL NAA+PROBE: NOT DETECTED
HCOV HKU1 RNA SPEC QL NAA+PROBE: NOT DETECTED
HCOV NL63 RNA SPEC QL NAA+PROBE: NOT DETECTED
HCOV OC43 RNA SPEC QL NAA+PROBE: NOT DETECTED
HCT VFR BLD AUTO: 43.9 % (ref 42–52)
HGB BLD-MCNC: 14.9 GM/DL (ref 14–18)
HMPV RNA NPH QL NAA+PROBE: NOT DETECTED
HPIV1 RNA NPH QL NAA+PROBE: NOT DETECTED
HPIV2 RNA NPH QL NAA+PROBE: NOT DETECTED
HPIV3 RNA NPH QL NAA+PROBE: NOT DETECTED
HPIV4 RNA NPH QL NAA+PROBE: NOT DETECTED
IMM GRANULOCYTES # BLD AUTO: 0.02 THOU/MM3 (ref 0–0.07)
IMM GRANULOCYTES NFR BLD AUTO: 0.2 %
INFLUENZA A (NO SUBTYPE) BY PCR: NORMAL
LYMPHOCYTES ABSOLUTE: 2 THOU/MM3 (ref 1–4.8)
LYMPHOCYTES NFR BLD AUTO: 22.9 %
M PNEUMO DNA SPEC QL NAA+PROBE: NOT DETECTED
MCH RBC QN AUTO: 33.2 PG (ref 26–33)
MCHC RBC AUTO-ENTMCNC: 33.9 GM/DL (ref 32.2–35.5)
MCV RBC AUTO: 97.8 FL (ref 80–94)
MONOCYTES ABSOLUTE: 1.1 THOU/MM3 (ref 0.4–1.3)
MONOCYTES NFR BLD AUTO: 13.1 %
NEUTROPHILS ABSOLUTE: 5.4 THOU/MM3 (ref 1.8–7.7)
NEUTROPHILS NFR BLD AUTO: 62.4 %
NRBC BLD AUTO-RTO: 0 /100 WBC
PLATELET # BLD AUTO: 151 THOU/MM3 (ref 130–400)
PMV BLD AUTO: 10.2 FL (ref 9.4–12.4)
POTASSIUM SERPL-SCNC: 4.3 MEQ/L (ref 3.5–5.2)
PROT SERPL-MCNC: 6.2 G/DL (ref 6.4–8.3)
RBC # BLD AUTO: 4.49 MILL/MM3 (ref 4.7–6.1)
RSV RNA NPH QL NAA+PROBE: NOT DETECTED
RV+EV RNA SPEC QL NAA+PROBE: NOT DETECTED
SARS-COV-2 RNA NPH QL NAA+NON-PROBE: NOT DETECTED
SODIUM SERPL-SCNC: 136 MEQ/L (ref 135–145)
TROPONIN, HIGH SENSITIVITY: 7 NG/L (ref 0–12)
WBC # BLD AUTO: 8.7 THOU/MM3 (ref 4.8–10.8)

## 2025-04-26 PROCEDURE — 96365 THER/PROPH/DIAG IV INF INIT: CPT

## 2025-04-26 PROCEDURE — 85025 COMPLETE CBC W/AUTO DIFF WBC: CPT

## 2025-04-26 PROCEDURE — 2500000003 HC RX 250 WO HCPCS: Performed by: PHYSICIAN ASSISTANT

## 2025-04-26 PROCEDURE — 80048 BASIC METABOLIC PNL TOTAL CA: CPT

## 2025-04-26 PROCEDURE — 36415 COLL VENOUS BLD VENIPUNCTURE: CPT

## 2025-04-26 PROCEDURE — 6360000002 HC RX W HCPCS: Performed by: PHYSICIAN ASSISTANT

## 2025-04-26 PROCEDURE — 6370000000 HC RX 637 (ALT 250 FOR IP): Performed by: PHYSICIAN ASSISTANT

## 2025-04-26 PROCEDURE — 96366 THER/PROPH/DIAG IV INF ADDON: CPT

## 2025-04-26 PROCEDURE — 99239 HOSP IP/OBS DSCHRG MGMT >30: CPT | Performed by: NURSE PRACTITIONER

## 2025-04-26 PROCEDURE — 93306 TTE W/DOPPLER COMPLETE: CPT

## 2025-04-26 PROCEDURE — 96376 TX/PRO/DX INJ SAME DRUG ADON: CPT

## 2025-04-26 PROCEDURE — G0378 HOSPITAL OBSERVATION PER HR: HCPCS

## 2025-04-26 PROCEDURE — 93010 ELECTROCARDIOGRAM REPORT: CPT | Performed by: INTERNAL MEDICINE

## 2025-04-26 PROCEDURE — 93306 TTE W/DOPPLER COMPLETE: CPT | Performed by: INTERNAL MEDICINE

## 2025-04-26 PROCEDURE — 80076 HEPATIC FUNCTION PANEL: CPT

## 2025-04-26 PROCEDURE — 96367 TX/PROPH/DG ADDL SEQ IV INF: CPT

## 2025-04-26 RX ORDER — METRONIDAZOLE 500 MG/100ML
500 INJECTION, SOLUTION INTRAVENOUS EVERY 8 HOURS
Status: DISCONTINUED | OUTPATIENT
Start: 2025-04-26 | End: 2025-04-26

## 2025-04-26 RX ORDER — CIPROFLOXACIN 2 MG/ML
400 INJECTION, SOLUTION INTRAVENOUS EVERY 12 HOURS
Status: DISCONTINUED | OUTPATIENT
Start: 2025-04-26 | End: 2025-04-26

## 2025-04-26 RX ADMIN — KETOROLAC TROMETHAMINE 15 MG: 30 INJECTION, SOLUTION INTRAMUSCULAR at 01:41

## 2025-04-26 RX ADMIN — DIGOXIN 125 MCG: 0.12 TABLET ORAL at 09:09

## 2025-04-26 RX ADMIN — SODIUM CHLORIDE, PRESERVATIVE FREE 10 ML: 5 INJECTION INTRAVENOUS at 09:08

## 2025-04-26 RX ADMIN — METRONIDAZOLE 500 MG: 500 INJECTION, SOLUTION INTRAVENOUS at 09:15

## 2025-04-26 RX ADMIN — METRONIDAZOLE 500 MG: 500 INJECTION, SOLUTION INTRAVENOUS at 02:47

## 2025-04-26 RX ADMIN — LISINOPRIL 10 MG: 10 TABLET ORAL at 09:09

## 2025-04-26 RX ADMIN — CIPROFLOXACIN 400 MG: 400 INJECTION, SOLUTION INTRAVENOUS at 01:40

## 2025-04-26 RX ADMIN — Medication 100 MG: at 09:09

## 2025-04-26 RX ADMIN — METOPROLOL SUCCINATE 100 MG: 100 TABLET, EXTENDED RELEASE ORAL at 09:09

## 2025-04-26 RX ADMIN — VERAPAMIL HYDROCHLORIDE 240 MG: 240 TABLET, FILM COATED, EXTENDED RELEASE ORAL at 09:09

## 2025-04-26 RX ADMIN — SPIRONOLACTONE 12.5 MG: 25 TABLET ORAL at 09:09

## 2025-04-26 ASSESSMENT — PAIN DESCRIPTION - FREQUENCY: FREQUENCY: INTERMITTENT

## 2025-04-26 ASSESSMENT — PAIN SCALES - GENERAL: PAINLEVEL_OUTOF10: 5

## 2025-04-26 ASSESSMENT — PAIN DESCRIPTION - ORIENTATION: ORIENTATION: UPPER;OTHER (COMMENT)

## 2025-04-26 ASSESSMENT — PAIN DESCRIPTION - LOCATION: LOCATION: ABDOMEN

## 2025-04-26 ASSESSMENT — PAIN DESCRIPTION - ONSET: ONSET: ON-GOING

## 2025-04-26 ASSESSMENT — PAIN DESCRIPTION - DESCRIPTORS: DESCRIPTORS: ACHING

## 2025-04-26 ASSESSMENT — PAIN DESCRIPTION - PAIN TYPE: TYPE: ACUTE PAIN

## 2025-04-26 ASSESSMENT — PAIN - FUNCTIONAL ASSESSMENT: PAIN_FUNCTIONAL_ASSESSMENT: ACTIVITIES ARE NOT PREVENTED

## 2025-04-26 NOTE — PROGRESS NOTES
Patient discharging home with wife. AVS printed and given to patient with education. No questions or concerns at this time. Vital signs within normal limits. Belongings packed and taken by patient. Refused transport to discharge lobby.

## 2025-04-26 NOTE — PROGRESS NOTES
Hospitalist Progress Note    Patient:  Cr Silva      Unit/Bed:8A-03/003-A    YOB: 1960    MRN: [id number]       Acct: [id number]     PCP: Russel Canela APRN - CNP    Date of Admission: 4/25/2025    Assessment/Plan:    Atypical chest pain, resolved. Likely musculoskeletal in nature. Was worse with breathing and movement, relieved by NSAID. No PE on CTA. ACS ruled out. TTE pending.   Transaminitis. Downward trend. Likely ETOH indusced. History of cholecystectomy. Liver US with hepatic steatosis.   Primary Hypertension. Continue home regimen.  Permanent atrial fibrillation with secondary hypercoagulable state. Restart OAC and ASA. Continue home rate controlling medications..  Alcohol abuse. Patient endorses drinking 5 standard drinks per day on a daily basis.  Reports no history of alcohol withdrawal.  Last drink last evening. Cessation advised. He is agreeable to quit. He does not want IP detox.   Isolated low grade temperature. WBC 13.6 on arrival, down to 8.7. PCT is negative. UA negative. CT abdomen no acute findings. CTA of the chest negative for PNA. BC NGTD. Not likely to have cholangitis, Stop IV ATB and monitor temperature.    Ok to eat. If TTE is negative ok to discharge home with OP Stress test.       Addendum  TTE Reviewed.  Ok to discharge home today.  OP stress.  ETOH cessation discussed at length.   Condition stable  Discharge time 34 min      Chief Complaint: CP    Hospital Course:     Cr Silva is a 65 y.o. male with a history of hypertension, permanent atrial fibrillation on Eliquis, and alcohol abuse who presented to [facility] with chief complaint of chest pain. The patient reports he woke up this morning along with chest pain.  This is located in the center of his chest and has radiated to his back.  The patient does report that it seems to get worse if he lays on his back.  It does not seem to vary with respirations.  He has not noted any associated

## 2025-04-26 NOTE — PLAN OF CARE
Problem: Chronic Conditions and Co-morbidities  Goal: Patient's chronic conditions and co-morbidity symptoms are monitored and maintained or improved  Outcome: Completed  Flowsheets (Taken 4/26/2025 0310)  Care Plan - Patient's Chronic Conditions and Co-Morbidity Symptoms are Monitored and Maintained or Improved:   Monitor and assess patient's chronic conditions and comorbid symptoms for stability, deterioration, or improvement   Collaborate with multidisciplinary team to address chronic and comorbid conditions and prevent exacerbation or deterioration   Update acute care plan with appropriate goals if chronic or comorbid symptoms are exacerbated and prevent overall improvement and discharge     Problem: Discharge Planning  Goal: Discharge to home or other facility with appropriate resources  Outcome: Progressing  Flowsheets (Taken 4/26/2025 0310)  Discharge to home or other facility with appropriate resources:   Identify barriers to discharge with patient and caregiver   Identify discharge learning needs (meds, wound care, etc)     Problem: Pain  Goal: Verbalizes/displays adequate comfort level or baseline comfort level  Outcome: Progressing  Flowsheets (Taken 4/26/2025 0310)  Verbalizes/displays adequate comfort level or baseline comfort level:   Encourage patient to monitor pain and request assistance   Administer analgesics based on type and severity of pain and evaluate response   Assess pain using appropriate pain scale   Implement non-pharmacological measures as appropriate and evaluate response  Note: Ongoing assessment & interventions provided throughout shift.  Reminded patient to report any pain, pressure, or shortness of breath to the nurse.  Pain medications provided per physician's orders.      Problem: Infection - Adult  Goal: Absence of infection at discharge  Outcome: Progressing  Flowsheets (Taken 4/26/2025 0310)  Absence of infection at discharge:   Assess and monitor for signs and symptoms of

## 2025-04-26 NOTE — PLAN OF CARE
Problem: Discharge Planning  Goal: Discharge to home or other facility with appropriate resources  4/26/2025 1009 by Roseanne Marley RN  Outcome: Progressing  Flowsheets (Taken 4/26/2025 0815)  Discharge to home or other facility with appropriate resources: Identify barriers to discharge with patient and caregiver  4/26/2025 0310 by Jaime Alfonso RN  Outcome: Progressing  Flowsheets (Taken 4/26/2025 0310)  Discharge to home or other facility with appropriate resources:   Identify barriers to discharge with patient and caregiver   Identify discharge learning needs (meds, wound care, etc)     Problem: Pain  Goal: Verbalizes/displays adequate comfort level or baseline comfort level  4/26/2025 1009 by Roseanne Marley RN  Outcome: Progressing  4/26/2025 0310 by Jaime Alfonso RN  Outcome: Progressing  Flowsheets (Taken 4/26/2025 0310)  Verbalizes/displays adequate comfort level or baseline comfort level:   Encourage patient to monitor pain and request assistance   Administer analgesics based on type and severity of pain and evaluate response   Assess pain using appropriate pain scale   Implement non-pharmacological measures as appropriate and evaluate response  Note: Ongoing assessment & interventions provided throughout shift.  Reminded patient to report any pain, pressure, or shortness of breath to the nurse.  Pain medications provided per physician's orders.      Problem: Infection - Adult  Goal: Absence of infection at discharge  4/26/2025 1009 by Roseanne Marley RN  Outcome: Progressing  Flowsheets (Taken 4/26/2025 0815)  Absence of infection at discharge:   Assess and monitor for signs and symptoms of infection   Monitor lab/diagnostic results   Monitor all insertion sites i.e., indwelling lines, tubes and drains  4/26/2025 0310 by Jaime Alfonso RN  Outcome: Progressing  Flowsheets (Taken 4/26/2025 0310)  Absence of infection at discharge:   Assess and monitor for signs and symptoms of infection   Monitor lab/diagnostic

## 2025-04-26 NOTE — PLAN OF CARE
Patient noted to be borderline febrile with a temperature of 100.0.  He has noted to have a leukocytosis at 13.6.  CRP is elevated at 3.41, and procalcitonin is only mildly elevated at 0.14.  Patient is noted to have transaminitis as well as elevated bilirubin.  CT of the chest abdomen and pelvis negative for acute findings.  Given abnormal liver function test, will initiate ciprofloxacin and metronidazole for possible intra-abdominal infection, specifically cholangitis, although this is less likely given absence of ductal dilatation on CT of the abdomen.  Trend hepatic function panel.  Ultrasound of the liver is pending.  Blood cultures pending.    Electronically signed by Dieudonne Torres PA-C on 4/26/25 at 12:56 AM EDT

## 2025-04-27 PROBLEM — R07.89 ATYPICAL CHEST PAIN: Status: ACTIVE | Noted: 2025-04-25

## 2025-04-27 PROBLEM — F10.90 CHRONIC ALCOHOL USE: Status: ACTIVE | Noted: 2025-04-27

## 2025-04-27 PROBLEM — R79.89 ELEVATED LFTS: Status: ACTIVE | Noted: 2025-04-27

## 2025-04-27 LAB
BACTERIA BLD AEROBE CULT: NORMAL
BACTERIA BLD AEROBE CULT: NORMAL
BACTERIA UR CULT: ABNORMAL
ORGANISM: ABNORMAL

## 2025-04-28 ENCOUNTER — TELEPHONE (OUTPATIENT)
Dept: FAMILY MEDICINE CLINIC | Age: 65
End: 2025-04-28

## 2025-04-28 NOTE — TELEPHONE ENCOUNTER
Care Transitions Initial Follow Up Call    Outreach made within 2 business days of discharge: Yes    Patient: Cr Silva Patient : 1960   MRN: [id number]  Reason for Admission: Chest pains  Discharge Date: 25       Spoke with: Cr    Discharge department/facility: [facility]    TCM Interactive Patient Contact:  Was patient able to fill all prescriptions: No: no new medication added  Was patient instructed to bring all medications to the follow-up visit: Yes  Is patient taking all medications as directed in the discharge summary? Yes  Does patient understand their discharge instructions: Yes  Does patient have questions or concerns that need addressed prior to 7-14 day follow up office visit: no    Additional needs identified to be addressed with provider  Patient will call our office back to schedule if needed.              Scheduled appointment with PCP within 7-14 days    Follow Up  Future Appointments   Date Time Provider Department Center   2025 12:00 PM Vanessa Gonzales MD [facility] - Yara Conti MA

## 2025-04-30 LAB
BACTERIA BLD AEROBE CULT: NORMAL
BACTERIA BLD AEROBE CULT: NORMAL

## 2025-06-23 NOTE — TELEPHONE ENCOUNTER
IR consulted regarding port.     Port and overlying skin appear intact. Nearly completely healed incision. Small suture tail removed with scissors.     Port accessed using sterile technique; easily flushed and aspirated. 100 unit heparin/ml dwell replaced.       Port is ready to use.     Chris Mcelroy MD      Patient states insurance hasn't changed. Patient notified echo cancelled until insurance is figured out.